# Patient Record
Sex: FEMALE | Race: OTHER | HISPANIC OR LATINO | Employment: FULL TIME | URBAN - METROPOLITAN AREA
[De-identification: names, ages, dates, MRNs, and addresses within clinical notes are randomized per-mention and may not be internally consistent; named-entity substitution may affect disease eponyms.]

---

## 2017-02-11 ENCOUNTER — APPOINTMENT (EMERGENCY)
Dept: RADIOLOGY | Facility: HOSPITAL | Age: 42
End: 2017-02-11
Payer: COMMERCIAL

## 2017-02-11 ENCOUNTER — HOSPITAL ENCOUNTER (EMERGENCY)
Facility: HOSPITAL | Age: 42
Discharge: HOME | End: 2017-02-11
Admitting: EMERGENCY MEDICINE
Payer: COMMERCIAL

## 2017-02-11 VITALS
RESPIRATION RATE: 16 BRPM | TEMPERATURE: 98.8 F | SYSTOLIC BLOOD PRESSURE: 101 MMHG | WEIGHT: 180 LBS | HEART RATE: 91 BPM | OXYGEN SATURATION: 96 % | DIASTOLIC BLOOD PRESSURE: 55 MMHG

## 2017-02-11 DIAGNOSIS — J11.1 INFLUENZA-LIKE ILLNESS: Primary | ICD-10-CM

## 2017-02-11 LAB
FLUAV AG SPEC QL IA: NEGATIVE
FLUAV AG SPEC QL: DETECTED
FLUBV AG SPEC QL IA: NEGATIVE
FLUBV AG SPEC QL: ABNORMAL
RSV B RNA SPEC QL NAA+PROBE: ABNORMAL

## 2017-02-11 PROCEDURE — 87798 DETECT AGENT NOS DNA AMP: CPT | Performed by: PHYSICIAN ASSISTANT

## 2017-02-11 PROCEDURE — 99284 EMERGENCY DEPT VISIT MOD MDM: CPT

## 2017-02-11 PROCEDURE — 71020 HB CHEST X-RAY 2VW FRONTAL&LATL: CPT

## 2017-02-11 PROCEDURE — 87400 INFLUENZA A/B EACH AG IA: CPT | Performed by: PHYSICIAN ASSISTANT

## 2017-02-11 PROCEDURE — 96360 HYDRATION IV INFUSION INIT: CPT

## 2017-02-11 RX ORDER — IBUPROFEN 600 MG/1
600 TABLET ORAL ONCE
Status: COMPLETED | OUTPATIENT
Start: 2017-02-11 | End: 2017-02-11

## 2017-02-11 RX ORDER — OSELTAMIVIR PHOSPHATE 75 MG/1
75 CAPSULE ORAL 2 TIMES DAILY
Qty: 10 CAPSULE | Refills: 0 | Status: SHIPPED | OUTPATIENT
Start: 2017-02-11 | End: 2017-02-16

## 2017-02-11 RX ADMIN — SODIUM CHLORIDE 1000 ML: 0.9 INJECTION, SOLUTION INTRAVENOUS at 09:01

## 2017-02-11 RX ADMIN — IBUPROFEN 600 MG: 600 TABLET, FILM COATED ORAL at 09:03

## 2019-03-21 ENCOUNTER — HOSPITAL ENCOUNTER (EMERGENCY)
Facility: HOSPITAL | Age: 44
Discharge: HOME | End: 2019-03-21
Attending: EMERGENCY MEDICINE | Admitting: EMERGENCY MEDICINE
Payer: COMMERCIAL

## 2019-03-21 ENCOUNTER — APPOINTMENT (EMERGENCY)
Dept: RADIOLOGY | Facility: HOSPITAL | Age: 44
End: 2019-03-21
Payer: COMMERCIAL

## 2019-03-21 VITALS
OXYGEN SATURATION: 100 % | HEART RATE: 85 BPM | BODY MASS INDEX: 32.01 KG/M2 | SYSTOLIC BLOOD PRESSURE: 116 MMHG | RESPIRATION RATE: 20 BRPM | TEMPERATURE: 98.2 F | DIASTOLIC BLOOD PRESSURE: 63 MMHG | WEIGHT: 204.37 LBS

## 2019-03-21 DIAGNOSIS — J20.9 ACUTE BRONCHITIS: Primary | ICD-10-CM

## 2019-03-21 PROCEDURE — 99283 EMERGENCY DEPT VISIT LOW MDM: CPT

## 2019-03-21 PROCEDURE — 71046 X-RAY EXAM CHEST 2 VIEWS: CPT

## 2019-03-21 RX ORDER — LEVOTHYROXINE SODIUM 0.1 MG/1
100 TABLET ORAL DAILY
COMMUNITY

## 2019-03-21 RX ORDER — METHYLPREDNISOLONE 4 MG/1
TABLET ORAL
Qty: 21 TABLET | Refills: 0 | Status: SHIPPED | OUTPATIENT
Start: 2019-03-21 | End: 2021-09-14 | Stop reason: ALTCHOICE

## 2019-03-21 RX ORDER — SUMATRIPTAN 50 MG/1
50 TABLET, FILM COATED ORAL
COMMUNITY
Start: 2017-04-11

## 2019-03-21 RX ORDER — AZITHROMYCIN 250 MG/1
250 TABLET, FILM COATED ORAL DAILY
Qty: 6 TABLET | Refills: 0 | Status: SHIPPED | OUTPATIENT
Start: 2019-03-21 | End: 2019-03-26

## 2019-03-21 RX ORDER — ALBUTEROL SULFATE 90 UG/1
2 AEROSOL, METERED RESPIRATORY (INHALATION) EVERY 4 HOURS PRN
Qty: 1 INHALER | Refills: 0 | Status: SHIPPED | OUTPATIENT
Start: 2019-03-21 | End: 2020-03-20

## 2019-03-21 RX ORDER — SPIRONOLACTONE 50 MG/1
TABLET, FILM COATED ORAL
COMMUNITY
Start: 2014-03-04

## 2019-03-21 RX ORDER — MECLIZINE HCL 12.5 MG/1
TABLET ORAL
COMMUNITY
End: 2021-09-14 | Stop reason: ALTCHOICE

## 2019-03-21 NOTE — ED PROVIDER NOTES
History  Chief Complaint   Patient presents with    Flu Symptoms     Patient c/o generalized body aches, headache, dry cough, subjetive fever all which began yesterday   Neck Pain     c/o left sided neck pain  This is a 59-year-old patient who started with generalized body aches diffuse nonfocal headache without blurred vision double vision no photophobia a dry hacky cough since last night and a subjective fever and chills  No shortness of breath no chest pain no pleuritic chest pain  She is not tachycardic or tachypneic  Nothing makes it better or worse she tried nothing over-the-counter approximately 1 month ago she was diagnosed with influenza  She states it feels the same  She also has some left-sided neck pain which is reproducible she states she slipped on the wrong way she does not have any signs of meningitis at this time does have some pain at the base her neck superior aspect of her trapped on her left without radicular symptoms  No nausea vomiting or diarrhea she is eating and making urine no urgency frequency or dysuria  Differential diagnosis includes not limited to influenza, pneumonia, bronchitis with neck spasm  Prior to Admission Medications   Prescriptions Last Dose Informant Patient Reported? Taking? Ergocalciferol (VITAMIN D2 PO)   Yes Yes   Si,000 Units   SUMAtriptan (IMITREX) 50 mg tablet   Yes Yes   Sig: Take 50 mg by mouth   levothyroxine (SYNTHROID) 100 mcg tablet   Yes No   Sig: Take 100 mcg by mouth daily 100 mcg Monday through Friday 88 Saturday and    meclizine (ANTIVERT) 12 5 MG tablet   Yes No   Sig: meclizine 12 5 mg tablet   spironolactone (ALDACTONE) 50 mg tablet   Yes Yes   Sig: spironolactone 50 mg tablet      Facility-Administered Medications: None       Past Medical History:   Diagnosis Date    Disease of thyroid gland        History reviewed  No pertinent surgical history  History reviewed  No pertinent family history    I have reviewed and agree with the history as documented  Social History     Tobacco Use    Smoking status: Never Smoker   Substance Use Topics    Alcohol use: No    Drug use: No        Review of Systems   All other systems reviewed and are negative  Physical Exam  Physical Exam   Constitutional: She appears well-developed and well-nourished  HENT:   Head: Normocephalic and atraumatic  Right Ear: External ear normal    Left Ear: External ear normal    Nose: Nose normal    Mouth/Throat: Oropharynx is clear and moist    Eyes: Pupils are equal, round, and reactive to light  Conjunctivae are normal    Neck: Normal range of motion  Neck supple  Cardiovascular: Normal rate and regular rhythm  Pulmonary/Chest: Effort normal    When she coughs she has a bit of expiratory wheeze otherwise lungs are clear on inspiration   Abdominal: Soft  Bowel sounds are normal  There is no tenderness  Musculoskeletal:        Arms:  Neurological: She is alert  Skin: Skin is warm  Psychiatric: She has a normal mood and affect  Her behavior is normal    Nursing note and vitals reviewed        Vital Signs  ED Triage Vitals [03/21/19 0835]   Temperature Pulse Respirations Blood Pressure SpO2   98 2 °F (36 8 °C) 85 20 116/63 100 %      Temp Source Heart Rate Source Patient Position - Orthostatic VS BP Location FiO2 (%)   Oral Monitor Lying Right arm --      Pain Score       6           Vitals:    03/21/19 0835   BP: 116/63   Pulse: 85   Patient Position - Orthostatic VS: Lying         Visual Acuity      ED Medications  Medications - No data to display    Diagnostic Studies  Results Reviewed     None                 XR chest 2 views   ED Interpretation by Chucho Hicks PA-C (03/21 3909)   nad                 Procedures  Procedures       Phone Contacts  ED Phone Contact    ED Course               PERC Rule for PE      Most Recent Value   PERC Rule for PE   Age >=50  0 Filed at: 03/21/2019 0934   HR >=100  0 Filed at: 03/21/2019 6899 O2 Sat on room air < 95%  --   History of PE or DVT  0 Filed at: 03/21/2019 8368   Recent trauma or surgery  0 Filed at: 03/21/2019 0934   Hemoptysis  0 Filed at: 03/21/2019 0987   Exogenous estrogen  0 Filed at: 03/21/2019 0934   Unilateral leg swelling  0 Filed at: 03/21/2019 0934   PERC Rule for PE Results  0 Filed at: 03/21/2019 9996                      MDM    Disposition  Final diagnoses:   Acute bronchitis     Time reflects when diagnosis was documented in both MDM as applicable and the Disposition within this note     Time User Action Codes Description Comment    3/21/2019  9:33 AM Russher Brunsony Add [J20 9] Acute bronchitis       ED Disposition     ED Disposition Condition Date/Time Comment    Discharge Stable Thu Mar 21, 2019  9:32 AM Melissa Melendez discharge to home/self care  Follow-up Information     Follow up With Specialties Details Why [address] [facility] [address], [place] Family Medicine Schedule an appointment as soon as possible for a visit   [address] [place] [address]            Patient's Medications   Discharge Prescriptions    ALBUTEROL (PROVENTIL HFA,VENTOLIN HFA) 90 MCG/ACT INHALER    Inhale 2 puffs every 4 (four) hours as needed for wheezing       Start Date: 3/21/2019 End Date: 3/20/2020       Order Dose: 2 puffs       Quantity: 1 Inhaler    Refills: 0    AZITHROMYCIN (ZITHROMAX) 250 MG TABLET    Take 1 tablet (250 mg total) by mouth daily for 5 days 2 po day 1 then the 1 po day 2-5       Start Date: 3/21/2019 End Date: 3/26/2019       Order Dose: 250 mg       Quantity: 6 tablet    Refills: 0    METHYLPREDNISOLONE 4 MG TABLET THERAPY PACK    Use as directed on package       Start Date: 3/21/2019 End Date: --       Order Dose: --       Quantity: 21 tablet    Refills: 0     No discharge procedures on file      ED Provider  Electronically Signed by           Jenaro Corona PA-C  03/21/19 5219

## 2021-04-29 DIAGNOSIS — K03.6 DENTAL CALCULUS: ICD-10-CM

## 2021-04-29 DIAGNOSIS — K03.6 ACCRETIONS ON TEETH: ICD-10-CM

## 2021-04-29 DIAGNOSIS — Z01.20 ENCOUNTER FOR DENTAL EXAMINATION: ICD-10-CM

## 2021-05-10 ENCOUNTER — HOSPITAL ENCOUNTER (EMERGENCY)
Facility: HOSPITAL | Age: 46
Discharge: HOME | End: 2021-05-10
Attending: EMERGENCY MEDICINE
Payer: COMMERCIAL

## 2021-05-10 ENCOUNTER — APPOINTMENT (EMERGENCY)
Dept: RADIOLOGY | Facility: HOSPITAL | Age: 46
End: 2021-05-10
Payer: COMMERCIAL

## 2021-05-10 VITALS
BODY MASS INDEX: 32.49 KG/M2 | DIASTOLIC BLOOD PRESSURE: 84 MMHG | SYSTOLIC BLOOD PRESSURE: 147 MMHG | TEMPERATURE: 98.4 F | RESPIRATION RATE: 18 BRPM | HEART RATE: 97 BPM | WEIGHT: 207.45 LBS | OXYGEN SATURATION: 98 %

## 2021-05-10 DIAGNOSIS — M54.9 BACK PAIN: ICD-10-CM

## 2021-05-10 DIAGNOSIS — M54.2 NECK PAIN: Primary | ICD-10-CM

## 2021-05-10 DIAGNOSIS — R42 DIZZINESS: ICD-10-CM

## 2021-05-10 DIAGNOSIS — M25.559 HIP PAIN: ICD-10-CM

## 2021-05-10 LAB
ALBUMIN SERPL BCP-MCNC: 3.7 G/DL (ref 3.5–5)
ALP SERPL-CCNC: 60 U/L (ref 46–116)
ALT SERPL W P-5'-P-CCNC: 19 U/L (ref 12–78)
ANION GAP SERPL CALCULATED.3IONS-SCNC: 8 MMOL/L (ref 4–13)
AST SERPL W P-5'-P-CCNC: 11 U/L (ref 5–45)
ATRIAL RATE: 73 BPM
BACTERIA UR QL AUTO: ABNORMAL /HPF
BASOPHILS # BLD AUTO: 0.03 THOUSANDS/ΜL (ref 0–0.1)
BASOPHILS NFR BLD AUTO: 1 % (ref 0–1)
BILIRUB SERPL-MCNC: 0.13 MG/DL (ref 0.2–1)
BILIRUB UR QL STRIP: NEGATIVE
BUN SERPL-MCNC: 9 MG/DL (ref 5–25)
CALCIUM SERPL-MCNC: 8.9 MG/DL (ref 8.3–10.1)
CHLORIDE SERPL-SCNC: 104 MMOL/L (ref 100–108)
CLARITY UR: CLEAR
CO2 SERPL-SCNC: 30 MMOL/L (ref 21–32)
COLOR UR: YELLOW
CREAT SERPL-MCNC: 0.61 MG/DL (ref 0.6–1.3)
EOSINOPHIL # BLD AUTO: 0.14 THOUSAND/ΜL (ref 0–0.61)
EOSINOPHIL NFR BLD AUTO: 2 % (ref 0–6)
ERYTHROCYTE [DISTWIDTH] IN BLOOD BY AUTOMATED COUNT: 14.2 % (ref 11.6–15.1)
EXT PREG TEST URINE: NORMAL
EXT. CONTROL ED NAV: NORMAL
GFR SERPL CREATININE-BSD FRML MDRD: 110 ML/MIN/1.73SQ M
GLUCOSE SERPL-MCNC: 87 MG/DL (ref 65–140)
GLUCOSE UR STRIP-MCNC: NEGATIVE MG/DL
HCT VFR BLD AUTO: 38.4 % (ref 34.8–46.1)
HGB BLD-MCNC: 11.9 G/DL (ref 11.5–15.4)
HGB UR QL STRIP.AUTO: ABNORMAL
IMM GRANULOCYTES # BLD AUTO: 0.03 THOUSAND/UL (ref 0–0.2)
IMM GRANULOCYTES NFR BLD AUTO: 1 % (ref 0–2)
KETONES UR STRIP-MCNC: NEGATIVE MG/DL
LEUKOCYTE ESTERASE UR QL STRIP: NEGATIVE
LYMPHOCYTES # BLD AUTO: 1.72 THOUSANDS/ΜL (ref 0.6–4.47)
LYMPHOCYTES NFR BLD AUTO: 30 % (ref 14–44)
MCH RBC QN AUTO: 28.1 PG (ref 26.8–34.3)
MCHC RBC AUTO-ENTMCNC: 31 G/DL (ref 31.4–37.4)
MCV RBC AUTO: 91 FL (ref 82–98)
MONOCYTES # BLD AUTO: 0.55 THOUSAND/ΜL (ref 0.17–1.22)
MONOCYTES NFR BLD AUTO: 10 % (ref 4–12)
NEUTROPHILS # BLD AUTO: 3.32 THOUSANDS/ΜL (ref 1.85–7.62)
NEUTS SEG NFR BLD AUTO: 56 % (ref 43–75)
NITRITE UR QL STRIP: NEGATIVE
NON-SQ EPI CELLS URNS QL MICRO: ABNORMAL /HPF
NRBC BLD AUTO-RTO: 0 /100 WBCS
P AXIS: 49 DEGREES
PH UR STRIP.AUTO: 5.5 [PH] (ref 4.5–8)
PLATELET # BLD AUTO: 348 THOUSANDS/UL (ref 149–390)
PMV BLD AUTO: 9.6 FL (ref 8.9–12.7)
POTASSIUM SERPL-SCNC: 4.1 MMOL/L (ref 3.5–5.3)
PR INTERVAL: 100 MS
PROT SERPL-MCNC: 8 G/DL (ref 6.4–8.2)
PROT UR STRIP-MCNC: NEGATIVE MG/DL
QRS AXIS: 94 DEGREES
QRSD INTERVAL: 86 MS
QT INTERVAL: 364 MS
QTC INTERVAL: 401 MS
RBC # BLD AUTO: 4.24 MILLION/UL (ref 3.81–5.12)
RBC #/AREA URNS AUTO: ABNORMAL /HPF
SODIUM SERPL-SCNC: 142 MMOL/L (ref 136–145)
SP GR UR STRIP.AUTO: 1.01 (ref 1–1.03)
T WAVE AXIS: 64 DEGREES
TROPONIN I SERPL-MCNC: <0.02 NG/ML
TSH SERPL DL<=0.05 MIU/L-ACNC: 3.72 UIU/ML (ref 0.36–3.74)
UROBILINOGEN UR QL STRIP.AUTO: 0.2 E.U./DL
VENTRICULAR RATE: 73 BPM
WBC # BLD AUTO: 5.79 THOUSAND/UL (ref 4.31–10.16)
WBC #/AREA URNS AUTO: ABNORMAL /HPF

## 2021-05-10 PROCEDURE — 85025 COMPLETE CBC W/AUTO DIFF WBC: CPT | Performed by: PHYSICIAN ASSISTANT

## 2021-05-10 PROCEDURE — 93005 ELECTROCARDIOGRAM TRACING: CPT

## 2021-05-10 PROCEDURE — 80053 COMPREHEN METABOLIC PANEL: CPT | Performed by: PHYSICIAN ASSISTANT

## 2021-05-10 PROCEDURE — 84443 ASSAY THYROID STIM HORMONE: CPT | Performed by: PHYSICIAN ASSISTANT

## 2021-05-10 PROCEDURE — 84484 ASSAY OF TROPONIN QUANT: CPT | Performed by: PHYSICIAN ASSISTANT

## 2021-05-10 PROCEDURE — 81025 URINE PREGNANCY TEST: CPT | Performed by: PHYSICIAN ASSISTANT

## 2021-05-10 PROCEDURE — 72040 X-RAY EXAM NECK SPINE 2-3 VW: CPT

## 2021-05-10 PROCEDURE — 93010 ELECTROCARDIOGRAM REPORT: CPT | Performed by: INTERNAL MEDICINE

## 2021-05-10 PROCEDURE — 96372 THER/PROPH/DIAG INJ SC/IM: CPT

## 2021-05-10 PROCEDURE — 36415 COLL VENOUS BLD VENIPUNCTURE: CPT | Performed by: PHYSICIAN ASSISTANT

## 2021-05-10 PROCEDURE — 73521 X-RAY EXAM HIPS BI 2 VIEWS: CPT

## 2021-05-10 PROCEDURE — 99285 EMERGENCY DEPT VISIT HI MDM: CPT | Performed by: PHYSICIAN ASSISTANT

## 2021-05-10 PROCEDURE — 72100 X-RAY EXAM L-S SPINE 2/3 VWS: CPT

## 2021-05-10 PROCEDURE — 99284 EMERGENCY DEPT VISIT MOD MDM: CPT

## 2021-05-10 PROCEDURE — 81001 URINALYSIS AUTO W/SCOPE: CPT

## 2021-05-10 RX ORDER — DIAZEPAM 5 MG/ML
5 INJECTION, SOLUTION INTRAMUSCULAR; INTRAVENOUS ONCE
Status: DISCONTINUED | OUTPATIENT
Start: 2021-05-10 | End: 2021-05-10 | Stop reason: HOSPADM

## 2021-05-10 RX ORDER — METHOCARBAMOL 500 MG/1
500 TABLET, FILM COATED ORAL EVERY 12 HOURS PRN
Qty: 21 TABLET | Refills: 0 | Status: SHIPPED | OUTPATIENT
Start: 2021-05-10 | End: 2021-09-14 | Stop reason: ALTCHOICE

## 2021-05-10 RX ORDER — KETOROLAC TROMETHAMINE 30 MG/ML
15 INJECTION, SOLUTION INTRAMUSCULAR; INTRAVENOUS ONCE
Status: COMPLETED | OUTPATIENT
Start: 2021-05-10 | End: 2021-05-10

## 2021-05-10 RX ADMIN — KETOROLAC TROMETHAMINE 15 MG: 30 INJECTION, SOLUTION INTRAMUSCULAR; INTRAVENOUS at 17:40

## 2021-05-10 NOTE — ED PROVIDER NOTES
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 Taking? Ergocalciferol (VITAMIN D2 PO)   Yes No   Si,000 Units   SUMAtriptan (IMITREX) 50 mg tablet   Yes No   Sig: Take 50 mg by mouth   levothyroxine (SYNTHROID) 100 mcg tablet   Yes No   Sig: Take 100 mcg by mouth daily 100 mcg Monday through Friday 88 Saturday and    meclizine (ANTIVERT) 12 5 MG tablet   Yes No   Sig: meclizine 12 5 mg tablet   methylPREDNISolone 4 MG tablet therapy pack   No No   Sig: Use as directed on package   spironolactone (ALDACTONE) 50 mg tablet   Yes No   Sig: spironolactone 50 mg tablet      Facility-Administered Medications: None       Past Medical History:   Diagnosis Date    Disease of thyroid gland     Scoliosis        History reviewed  No pertinent surgical history  History reviewed  No pertinent family history  I have reviewed and agree with the history as documented      E-Cigarette/Vaping    E-Cigarette Use Never User      E-Cigarette/Vaping Substances     Social History     Tobacco Use    Smoking status: Never Smoker    Smokeless tobacco: Never Used   Substance Use Topics    Alcohol use: No    Drug use: No       Review of Systems   Constitutional: Negative for chills and fever  HENT: Negative for ear pain and sore throat  Eyes: Negative for visual disturbance  Respiratory: Negative for cough and shortness of breath  Cardiovascular: Negative for chest pain, palpitations and leg swelling  Gastrointestinal: Negative for abdominal pain, diarrhea, nausea and vomiting  Genitourinary: Negative for dysuria and hematuria  Musculoskeletal: Positive for back pain and neck pain  Bilateral hip and thigh pain   Skin: Negative for rash  Neurological: Positive for dizziness  Negative for speech difficulty and headaches  Psychiatric/Behavioral: Negative for confusion  Physical Exam  Physical Exam  Constitutional:       General: She is not in acute distress  Appearance: She is well-developed  She is not ill-appearing or toxic-appearing  HENT:      Head: Normocephalic and atraumatic  Right Ear: External ear normal       Left Ear: External ear normal       Nose: Nose normal       Mouth/Throat:      Lips: Pink  Mouth: Mucous membranes are moist    Eyes:      Extraocular Movements: Extraocular movements intact  Conjunctiva/sclera: Conjunctivae normal       Pupils: Pupils are equal, round, and reactive to light  Neck:      Musculoskeletal: Normal range of motion and neck supple  Cardiovascular:      Rate and Rhythm: Normal rate and regular rhythm  Pulmonary:      Effort: Pulmonary effort is normal       Breath sounds: Normal breath sounds  No decreased breath sounds, wheezing, rhonchi or rales  Musculoskeletal:      Right hip: Normal       Left hip: Normal       Cervical back: Normal       Thoracic back: Normal       Lumbar back: She exhibits decreased range of motion and tenderness   She exhibits no bony tenderness (no midline)  Comments: Neurovascularly intact distally  5/5 strength bilateral lower extremities  Distal pulses intact  Cap refill <2 seconds  Sensation intact  Skin:     General: Skin is warm and dry  Capillary Refill: Capillary refill takes less than 2 seconds  Findings: No rash  Neurological:      General: No focal deficit present  Mental Status: She is alert and oriented to person, place, and time  GCS: GCS eye subscore is 4  GCS verbal subscore is 5  GCS motor subscore is 6  Sensory: Sensation is intact  Motor: Motor function is intact  Coordination: Coordination is intact  Gait: Gait is intact  Deep Tendon Reflexes: Reflexes are normal and symmetric  Reflex Scores:       Patellar reflexes are 2+ on the right side and 2+ on the left side    Psychiatric:         Behavior: Behavior normal          Vital Signs  ED Triage Vitals [05/10/21 1432]   Temperature Pulse Respirations Blood Pressure SpO2   98 4 °F (36 9 °C) 97 18 147/84 98 %      Temp Source Heart Rate Source Patient Position - Orthostatic VS BP Location FiO2 (%)   Oral Monitor Sitting Right arm --      Pain Score       6           Vitals:    05/10/21 1432   BP: 147/84   Pulse: 97   Patient Position - Orthostatic VS: Sitting         Visual Acuity      ED Medications  Medications   ketorolac (TORADOL) injection 15 mg (15 mg Intramuscular Given by Other 5/10/21 1740)       Diagnostic Studies  Results Reviewed     Procedure Component Value Units Date/Time    Urine Microscopic [[id number]]  (Abnormal) Collected: 05/10/21 1711    Lab Status: Final result Specimen: Urine, Clean Catch Updated: 05/10/21 1753     RBC, UA 0-1 /hpf      WBC, UA None Seen /hpf      Epithelial Cells Occasional /hpf      Bacteria, UA Occasional /hpf     POCT pregnancy, urine [[id number]]  (Normal) Resulted: 05/10/21 1713    Lab Status: Final result Updated: 05/10/21 1713     EXT PREG TEST UR (Ref: Negative) Negative (-)     Control Valid    Urine Macroscopic, POC [[id number]]  (Abnormal) Collected: 05/10/21 1711    Lab Status: Final result Specimen: Urine Updated: 05/10/21 1712     Color, UA Yellow     Clarity, UA Clear     pH, UA 5 5     Leukocytes, UA Negative     Nitrite, UA Negative     Protein, UA Negative mg/dl      Glucose, UA Negative mg/dl      Ketones, UA Negative mg/dl      Urobilinogen, UA 0 2 E U /dl      Bilirubin, UA Negative     Blood, UA Trace     Specific [place], UA 1 015    Narrative:      CLINITEK RESULT    TSH [[id number]]  (Normal) Collected: 05/10/21 1620    Lab Status: Final result Specimen: Blood from Arm, Left Updated: 05/10/21 1652     TSH 3RD GENERATON 3 721 uIU/mL     Narrative:      Patients undergoing fluorescein dye angiography may retain small amounts of fluorescein in the body for 48-72 hours post procedure  Samples containing fluorescein can produce falsely depressed TSH values  If the patient had this procedure,a specimen should be resubmitted post fluorescein clearance        Troponin I [[id number]]  (Normal) Collected: 05/10/21 1620    Lab Status: Final result Specimen: Blood from Arm, Left Updated: 05/10/21 1645     Troponin I <0 02 ng/mL     Comprehensive metabolic panel [[id number]]  (Abnormal) Collected: 05/10/21 1620    Lab Status: Final result Specimen: Blood from Arm, Left Updated: 05/10/21 1643     Sodium 142 mmol/L      Potassium 4 1 mmol/L      Chloride 104 mmol/L      CO2 30 mmol/L      ANION GAP 8 mmol/L      BUN 9 mg/dL      Creatinine 0 61 mg/dL      Glucose 87 mg/dL      Calcium 8 9 mg/dL      AST 11 U/L      ALT 19 U/L      Alkaline Phosphatase 60 U/L      Total Protein 8 0 g/dL      Albumin 3 7 g/dL      Total Bilirubin 0 13 mg/dL      eGFR 110 ml/min/1 73sq m     Narrative:      [place] guidelines for Chronic Kidney Disease (CKD):     Stage 1 with normal or high GFR (GFR > 90 mL/min/1 73 square meters)    Stage 2 Mild CKD (GFR = 60-89 mL/min/1 73 square meters)    Stage 3A Moderate CKD (GFR = 45-59 mL/min/1 73 square meters)    Stage 3B Moderate CKD (GFR = 30-44 mL/min/1 73 square meters)    Stage 4 Severe CKD (GFR = 15-29 mL/min/1 73 square meters)    Stage 5 End Stage CKD (GFR <15 mL/min/1 73 square meters)  Note: GFR calculation is accurate only with a steady state creatinine    CBC and differential [[id number]]  (Abnormal) Collected: 05/10/21 1620    Lab Status: Final result Specimen: Blood from Arm, Left Updated: 05/10/21 1625     WBC 5 79 Thousand/uL      RBC 4 24 Million/uL      Hemoglobin 11 9 g/dL      Hematocrit 38 4 %      MCV 91 fL      MCH 28 1 pg      MCHC 31 0 g/dL      RDW 14 2 %      MPV 9 6 fL      Platelets 890 Thousands/uL      nRBC 0 /100 WBCs      Neutrophils Relative 56 %      Immat GRANS % 1 %      Lymphocytes Relative 30 %      Monocytes Relative 10 %      Eosinophils Relative 2 %      Basophils Relative 1 %      Neutrophils Absolute 3 32 Thousands/µL      Immature Grans Absolute 0 03 Thousand/uL      Lymphocytes Absolute 1 72 Thousands/µL      Monocytes Absolute 0 55 Thousand/µL      Eosinophils Absolute 0 14 Thousand/µL      Basophils Absolute 0 03 Thousands/µL                  XR lumbar spine 2 or 3 views   Final Result by Natalie Colon DO (05/10 1709)   Stable concave right thoracolumbar scoliotic curvature  Stable minimal facet degenerative changes L4-L5  Workstation performed: [id number]         XR cervical spine 2 or 3 views   Final Result by Natalie Colon DO (05/10 1716)   No acute osseous abnormality  Workstation performed: [id number]         XR hips bilateral with ap pelvis 2 vw   Final Result by Natalie Colon DO (05/10 1701)   Mild degenerative changes pubic symphysis  No acute osseous abnormality              Workstation performed: [id number]                    Procedures  ECG 12 Lead Documentation Only    Date/Time: 5/10/2021 4:28 PM  Performed by: Dilma De Jesus XANDER  Authorized by: Matt Moreno PA-C     Indications / Diagnosis:  Dizziness  ECG reviewed by me, the ED Provider: yes    Patient location:  ED  Previous ECG:     Previous ECG:  Unavailable    Comparison to cardiac monitor: Yes    Interpretation:     Interpretation: non-specific    Rate:     ECG rate:  73    ECG rate assessment: normal    Rhythm:     Rhythm: sinus rhythm    Ectopy:     Ectopy: none    QRS:     QRS axis:  Right    QRS intervals:  Normal  Conduction:     Conduction: normal    ST segments:     ST segments:  Normal  T waves:     T waves: normal    Comments:      QT/QTc: 364/401  No STEMI             ED Course  ED Course as of May 14 0729   Mon May 10, 2021   1712 Stable concave right thoracolumbar scoliotic curvature  Stable minimal facet degenerative changes L4-L5  XR lumbar spine 2 or 3 views   1712 Mild degenerative changes pubic symphysis  No acute osseous abnormality  XR hips bilateral with ap pelvis 2 vw   1732 No acute osseous abnormality  XR cervical spine 2 or 3 views                             SBIRT 20yo+      Most Recent Value   SBIRT (24 yo +)   In order to provide better care to our patients, we are screening all of our patients for alcohol and drug use  Would it be okay to ask you these screening questions? 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   Pt states she also has been having intermittent episodes of dizziness, worse with head movement  Pt denies dizziness at this moment  EKG shows no arrhythmia or ST abnormality   Troponin negative  Lab work including TSH without significant abnormality  X-ray bilateral hips/pelvis shows Mild degenerative changes pubic symphysis  No acute osseous abnormality  X-ray lumbar spine shows Stable concave right thoracolumbar scoliotic curvature  Stable minimal facet degenerative changes L4-L5  X-ray cervical spine shows No acute osseous abnormality  Toradol given in emergency department  Rx for Robaxin provided for symptomatic relief of body pain  Discussed lab work and imaging with patient at bedside  Recommended patient follow-up with PCP regarding ongoing symptoms for further workup and evaluation  Patient verbalizes understanding and agrees with plan  The management plan was discussed in detail with the patient at bedside and all questions were answered  Prior to discharge, I provided both verbal and written instructions  I discussed with the patient the signs and symptoms for which to return to the emergency department  All questions were answered and patient was comfortable with the plan of care and discharged to home  The patient agrees to return to the Emergency Department for concerns and/or progression of illness          Disposition  Final diagnoses:   Neck pain   Back pain   Hip pain   Dizziness     Time reflects when diagnosis was documented in both MDM as applicable and the Disposition within this note     Time User Action Codes Description Comment    5/10/2021  5:26 PM Belita Corolla Add [M54 2] Neck pain     5/10/2021  5:26 PM Belita Corolla Add [M54 9] Back pain     5/10/2021  5:26 PM Belita Corolla Add [M25 559] Hip pain     5/10/2021  5:26 PM Belita Corolla Add [R42] Dizziness       ED Disposition     ED Disposition Condition Date/Time Comment    Discharge Stable Mon May 10, 2021  5:27 PM Rashid Razo discharge to home/self care  Follow-up Information     Follow up With Specialties Details Why Contact Info Additional Information    José Miguel Reddy MD [facility] Schedule an appointment as soon as possible for a visit   [facility] [address]  [address] Rheumatology   [address]  [place] [address] [facility], [address], [address] [facility], [facility], [place], [id number] [phone]          Discharge Medication List as of 5/10/2021  5:27 PM      START taking these medications    Details   methocarbamol (ROBAXIN) 500 mg tablet Take 1 tablet (500 mg total) by mouth every 12 (twelve) hours as needed for muscle spasms, Starting Mon 5/10/2021, Normal         CONTINUE these medications which have NOT CHANGED    Details   Ergocalciferol (VITAMIN D2 PO) 50,000 Units, Starting Mon 3/16/2015, Historical Med      levothyroxine (SYNTHROID) 100 mcg tablet Take 100 mcg by mouth daily 100 mcg Monday through Friday 88 Saturday and Sunday, Historical Med      meclizine (ANTIVERT) 12 5 MG tablet meclizine 12 5 mg tablet, Historical Med      methylPREDNISolone 4 MG tablet therapy pack Use as directed on package, Print      spironolactone (ALDACTONE) 50 mg tablet spironolactone 50 mg tablet, Historical Med      SUMAtriptan (IMITREX) 50 mg tablet Take 50 mg by mouth, Starting Tue 4/11/2017, Historical Med           No discharge procedures on file      PDMP Review     None          ED Provider  Electronically Signed by           Sudheer Fam PA-C  05/14/21 2914

## 2021-05-10 NOTE — Clinical Note
Unruly Carmen was seen and treated in our emergency department on 5/10/2021  Diagnosis:     Yanci Joseph  may return to work on return date  She may return on this date: 05/12/2021         If you have any questions or concerns, please don't hesitate to call        Elidia Osborne PA-C    ______________________________           _______________          _______________  Hospital Representative                              Date                                Time

## 2021-09-14 ENCOUNTER — APPOINTMENT (EMERGENCY)
Dept: CT IMAGING | Facility: HOSPITAL | Age: 46
End: 2021-09-14
Payer: COMMERCIAL

## 2021-09-14 ENCOUNTER — HOSPITAL ENCOUNTER (EMERGENCY)
Facility: HOSPITAL | Age: 46
Discharge: HOME | End: 2021-09-14
Attending: EMERGENCY MEDICINE | Admitting: EMERGENCY MEDICINE
Payer: COMMERCIAL

## 2021-09-14 VITALS
HEIGHT: 67 IN | WEIGHT: 215.61 LBS | SYSTOLIC BLOOD PRESSURE: 127 MMHG | RESPIRATION RATE: 16 BRPM | TEMPERATURE: 98.5 F | HEART RATE: 70 BPM | OXYGEN SATURATION: 98 % | BODY MASS INDEX: 33.84 KG/M2 | DIASTOLIC BLOOD PRESSURE: 80 MMHG

## 2021-09-14 DIAGNOSIS — R79.89 ELEVATED TSH: ICD-10-CM

## 2021-09-14 DIAGNOSIS — R10.9 FLANK PAIN: Primary | ICD-10-CM

## 2021-09-14 DIAGNOSIS — N94.89 PELVIC CONGESTION SYNDROME: ICD-10-CM

## 2021-09-14 DIAGNOSIS — R91.1 LUNG NODULE: ICD-10-CM

## 2021-09-14 LAB
ALBUMIN SERPL BCP-MCNC: 4 G/DL (ref 3.5–5)
ALP SERPL-CCNC: 58 U/L (ref 46–116)
ALT SERPL W P-5'-P-CCNC: 23 U/L (ref 12–78)
ANION GAP SERPL CALCULATED.3IONS-SCNC: 7 MMOL/L (ref 4–13)
AST SERPL W P-5'-P-CCNC: 17 U/L (ref 5–45)
ATRIAL RATE: 65 BPM
ATRIAL RATE: 81 BPM
BACTERIA UR QL AUTO: ABNORMAL /HPF
BASOPHILS # BLD AUTO: 0.06 THOUSANDS/ΜL (ref 0–0.1)
BASOPHILS NFR BLD AUTO: 1 % (ref 0–1)
BILIRUB SERPL-MCNC: 0.22 MG/DL (ref 0.2–1)
BILIRUB UR QL STRIP: NEGATIVE
BUN SERPL-MCNC: 5 MG/DL (ref 5–25)
CALCIUM SERPL-MCNC: 9.2 MG/DL (ref 8.3–10.1)
CHLORIDE SERPL-SCNC: 101 MMOL/L (ref 100–108)
CLARITY UR: CLEAR
CO2 SERPL-SCNC: 31 MMOL/L (ref 21–32)
COLOR UR: YELLOW
CREAT SERPL-MCNC: 0.72 MG/DL (ref 0.6–1.3)
EOSINOPHIL # BLD AUTO: 0.15 THOUSAND/ΜL (ref 0–0.61)
EOSINOPHIL NFR BLD AUTO: 2 % (ref 0–6)
ERYTHROCYTE [DISTWIDTH] IN BLOOD BY AUTOMATED COUNT: 13.1 % (ref 11.6–15.1)
EXT PREG TEST URINE: NEGATIVE
EXT. CONTROL ED NAV: NORMAL
GFR SERPL CREATININE-BSD FRML MDRD: 101 ML/MIN/1.73SQ M
GLUCOSE SERPL-MCNC: 84 MG/DL (ref 65–140)
GLUCOSE UR STRIP-MCNC: NEGATIVE MG/DL
HCT VFR BLD AUTO: 39.9 % (ref 34.8–46.1)
HGB BLD-MCNC: 12.8 G/DL (ref 11.5–15.4)
HGB UR QL STRIP.AUTO: ABNORMAL
IMM GRANULOCYTES # BLD AUTO: 0.02 THOUSAND/UL (ref 0–0.2)
IMM GRANULOCYTES NFR BLD AUTO: 0 % (ref 0–2)
KETONES UR STRIP-MCNC: NEGATIVE MG/DL
LEUKOCYTE ESTERASE UR QL STRIP: NEGATIVE
LIPASE SERPL-CCNC: 134 U/L (ref 73–393)
LYMPHOCYTES # BLD AUTO: 2.01 THOUSANDS/ΜL (ref 0.6–4.47)
LYMPHOCYTES NFR BLD AUTO: 32 % (ref 14–44)
MCH RBC QN AUTO: 30.2 PG (ref 26.8–34.3)
MCHC RBC AUTO-ENTMCNC: 32.1 G/DL (ref 31.4–37.4)
MCV RBC AUTO: 94 FL (ref 82–98)
MONOCYTES # BLD AUTO: 0.5 THOUSAND/ΜL (ref 0.17–1.22)
MONOCYTES NFR BLD AUTO: 8 % (ref 4–12)
NEUTROPHILS # BLD AUTO: 3.6 THOUSANDS/ΜL (ref 1.85–7.62)
NEUTS SEG NFR BLD AUTO: 57 % (ref 43–75)
NITRITE UR QL STRIP: NEGATIVE
NON-SQ EPI CELLS URNS QL MICRO: ABNORMAL /HPF
NRBC BLD AUTO-RTO: 0 /100 WBCS
P AXIS: 73 DEGREES
P AXIS: 74 DEGREES
PH UR STRIP.AUTO: 5.5 [PH] (ref 4.5–8)
PLATELET # BLD AUTO: 304 THOUSANDS/UL (ref 149–390)
PMV BLD AUTO: 9.7 FL (ref 8.9–12.7)
POTASSIUM SERPL-SCNC: 3.6 MMOL/L (ref 3.5–5.3)
PR INTERVAL: 114 MS
PR INTERVAL: 116 MS
PROT SERPL-MCNC: 8.4 G/DL (ref 6.4–8.2)
PROT UR STRIP-MCNC: NEGATIVE MG/DL
QRS AXIS: 91 DEGREES
QRS AXIS: 93 DEGREES
QRSD INTERVAL: 82 MS
QRSD INTERVAL: 86 MS
QT INTERVAL: 398 MS
QT INTERVAL: 420 MS
QTC INTERVAL: 436 MS
QTC INTERVAL: 462 MS
RBC # BLD AUTO: 4.24 MILLION/UL (ref 3.81–5.12)
RBC #/AREA URNS AUTO: ABNORMAL /HPF
SODIUM SERPL-SCNC: 139 MMOL/L (ref 136–145)
SP GR UR STRIP.AUTO: 1.01 (ref 1–1.03)
T WAVE AXIS: 54 DEGREES
T WAVE AXIS: 67 DEGREES
TROPONIN I SERPL-MCNC: <0.02 NG/ML
TROPONIN I SERPL-MCNC: <0.02 NG/ML
TSH SERPL DL<=0.05 MIU/L-ACNC: 5.49 UIU/ML (ref 0.36–3.74)
UROBILINOGEN UR QL STRIP.AUTO: 0.2 E.U./DL
VENTRICULAR RATE: 65 BPM
VENTRICULAR RATE: 81 BPM
WBC # BLD AUTO: 6.34 THOUSAND/UL (ref 4.31–10.16)
WBC #/AREA URNS AUTO: ABNORMAL /HPF

## 2021-09-14 PROCEDURE — 74177 CT ABD & PELVIS W/CONTRAST: CPT

## 2021-09-14 PROCEDURE — 85025 COMPLETE CBC W/AUTO DIFF WBC: CPT | Performed by: PHYSICIAN ASSISTANT

## 2021-09-14 PROCEDURE — 84484 ASSAY OF TROPONIN QUANT: CPT | Performed by: PHYSICIAN ASSISTANT

## 2021-09-14 PROCEDURE — 74176 CT ABD & PELVIS W/O CONTRAST: CPT

## 2021-09-14 PROCEDURE — 99284 EMERGENCY DEPT VISIT MOD MDM: CPT

## 2021-09-14 PROCEDURE — 96375 TX/PRO/DX INJ NEW DRUG ADDON: CPT

## 2021-09-14 PROCEDURE — 36415 COLL VENOUS BLD VENIPUNCTURE: CPT | Performed by: PHYSICIAN ASSISTANT

## 2021-09-14 PROCEDURE — 84443 ASSAY THYROID STIM HORMONE: CPT | Performed by: PHYSICIAN ASSISTANT

## 2021-09-14 PROCEDURE — 80053 COMPREHEN METABOLIC PANEL: CPT | Performed by: PHYSICIAN ASSISTANT

## 2021-09-14 PROCEDURE — 93005 ELECTROCARDIOGRAM TRACING: CPT

## 2021-09-14 PROCEDURE — 96374 THER/PROPH/DIAG INJ IV PUSH: CPT

## 2021-09-14 PROCEDURE — 83690 ASSAY OF LIPASE: CPT | Performed by: PHYSICIAN ASSISTANT

## 2021-09-14 PROCEDURE — 93010 ELECTROCARDIOGRAM REPORT: CPT | Performed by: INTERNAL MEDICINE

## 2021-09-14 PROCEDURE — 96361 HYDRATE IV INFUSION ADD-ON: CPT

## 2021-09-14 PROCEDURE — 81025 URINE PREGNANCY TEST: CPT | Performed by: EMERGENCY MEDICINE

## 2021-09-14 PROCEDURE — 99285 EMERGENCY DEPT VISIT HI MDM: CPT | Performed by: PHYSICIAN ASSISTANT

## 2021-09-14 PROCEDURE — 81001 URINALYSIS AUTO W/SCOPE: CPT

## 2021-09-14 RX ORDER — ONDANSETRON 2 MG/ML
4 INJECTION INTRAMUSCULAR; INTRAVENOUS ONCE
Status: COMPLETED | OUTPATIENT
Start: 2021-09-14 | End: 2021-09-14

## 2021-09-14 RX ORDER — KETOROLAC TROMETHAMINE 30 MG/ML
15 INJECTION, SOLUTION INTRAMUSCULAR; INTRAVENOUS ONCE
Status: COMPLETED | OUTPATIENT
Start: 2021-09-14 | End: 2021-09-14

## 2021-09-14 RX ADMIN — SODIUM CHLORIDE 1000 ML: 0.9 INJECTION, SOLUTION INTRAVENOUS at 14:28

## 2021-09-14 RX ADMIN — ONDANSETRON 4 MG: 2 INJECTION INTRAMUSCULAR; INTRAVENOUS at 14:28

## 2021-09-14 RX ADMIN — IOHEXOL 100 ML: 350 INJECTION, SOLUTION INTRAVENOUS at 18:14

## 2021-09-14 RX ADMIN — MORPHINE SULFATE 2 MG: 2 INJECTION, SOLUTION INTRAMUSCULAR; INTRAVENOUS at 17:51

## 2021-09-14 RX ADMIN — KETOROLAC TROMETHAMINE 15 MG: 30 INJECTION, SOLUTION INTRAMUSCULAR; INTRAVENOUS at 14:30

## 2021-09-14 NOTE — ED PROVIDER NOTES
History  Chief Complaint   Patient presents with    Flank Pain     R flank pain x 1 week pain comes and goes, also having nausea  pain does not radiate into abdomen, denies hx of kidney stones     Patient presents to the ER for evaluation of right flank pain  The patient states that the pain began around 1 week ago  States that the pain "comes and goes " Denies anything that makes the pain better  Denies taking any medication PTA  Notes mild associated nausea  States that she also felt like her heart was racing last night and had an episode of chest pain when her flank pain became severe  The patient states that she saw her PCP who was concerned that it may be kidney stones and gave her a script for an outpatient ultrasound however states that she has not gotten it done yet  Patient states she has a history of a cholecystectomy and two c-sections  Patient denies any abdominal trauma  Denies any fevers, headaches, dizziness, syncope, difficulty breathing, vomiting, diarrhea, dysuria, or any other concerning symptoms  Prior to Admission Medications   Prescriptions Last Dose Informant Patient Reported? Taking? Ergocalciferol (VITAMIN D2 PO) 2021 at Unknown time  Yes Yes   Si,000 Units   SUMAtriptan (IMITREX) 50 mg tablet   Yes No   Sig: Take 50 mg by mouth   levothyroxine (SYNTHROID) 100 mcg tablet 2021 at Unknown time  Yes Yes   Sig: Take 100 mcg by mouth daily 100 mcg Monday through Friday 88 Saturday and    spironolactone (ALDACTONE) 50 mg tablet 2021 at Unknown time  Yes Yes   Sig: spironolactone 50 mg tablet      Facility-Administered Medications: None       Past Medical History:   Diagnosis Date    Disease of thyroid gland     Scoliosis        Past Surgical History:   Procedure Laterality Date     SECTION      CHOLECYSTECTOMY         History reviewed  No pertinent family history  I have reviewed and agree with the history as documented      E-Cigarette/Vaping    E-Cigarette Use Never User      E-Cigarette/Vaping Substances     Social History     Tobacco Use    Smoking status: Never Smoker    Smokeless tobacco: Never Used   Vaping Use    Vaping Use: Never used   Substance Use Topics    Alcohol use: No    Drug use: No       Review of Systems   Constitutional: Negative for fever  HENT: Negative for congestion, rhinorrhea and sore throat  Respiratory: Negative for shortness of breath  Cardiovascular: Positive for chest pain  Gastrointestinal: Positive for abdominal pain and nausea  Negative for diarrhea and vomiting  Genitourinary: Positive for flank pain  Negative for dysuria  Musculoskeletal: Negative for back pain and neck pain  Skin: Negative for rash  Neurological: Negative for weakness, numbness and headaches  All other systems reviewed and are negative  Physical Exam  Physical Exam  Constitutional:       Appearance: She is well-developed  HENT:      Head: Normocephalic and atraumatic  Nose: Nose normal    Eyes:      Conjunctiva/sclera: Conjunctivae normal    Cardiovascular:      Rate and Rhythm: Normal rate  Heart sounds: Normal heart sounds  Pulmonary:      Effort: Pulmonary effort is normal  No respiratory distress  Breath sounds: Normal breath sounds  No stridor  No wheezing, rhonchi or rales  Abdominal:      Palpations: Abdomen is soft  Tenderness: There is abdominal tenderness in the right upper quadrant and right lower quadrant  There is no guarding  Musculoskeletal:         General: Normal range of motion  Cervical back: Normal range of motion  Skin:     General: Skin is warm  Capillary Refill: Capillary refill takes less than 2 seconds  Neurological:      Mental Status: She is alert and oriented to person, place, and time           Vital Signs  ED Triage Vitals   Temperature Pulse Respirations Blood Pressure SpO2   09/14/21 1220 09/14/21 1220 09/14/21 1220 09/14/21 1220 09/14/21 1220   98 5 °F (36 9 °C) 92 19 135/71 98 %      Temp Source Heart Rate Source Patient Position - Orthostatic VS BP Location FiO2 (%)   09/14/21 1220 09/14/21 1220 09/14/21 1439 09/14/21 1439 --   Oral Monitor Lying Right arm       Pain Score       09/14/21 1220       9           Vitals:    09/14/21 1220 09/14/21 1439 09/14/21 1715 09/14/21 2012   BP: 135/71 115/63 126/71 127/80   Pulse: 92 70 80 70   Patient Position - Orthostatic VS:  Lying Lying Lying         Visual Acuity      ED Medications  Medications   sodium chloride 0 9 % bolus 1,000 mL (0 mL Intravenous Stopped 9/14/21 1632)   ketorolac (TORADOL) injection 15 mg (15 mg Intravenous Given 9/14/21 1430)   ondansetron (ZOFRAN) injection 4 mg (4 mg Intravenous Given 9/14/21 1428)   morphine injection 2 mg (2 mg Intravenous Given 9/14/21 1751)   iohexol (OMNIPAQUE) 350 MG/ML injection (SINGLE-DOSE) 100 mL (100 mL Intravenous Given 9/14/21 1814)       Diagnostic Studies  Results Reviewed     Procedure Component Value Units Date/Time    Troponin I [[id number]]  (Normal) Collected: 09/14/21 1851    Lab Status: Final result Specimen: Blood from Arm, Left Updated: 09/14/21 1923     Troponin I <0 02 ng/mL     TSH [[id number]]  (Abnormal) Collected: 09/14/21 1432    Lab Status: Final result Specimen: Blood from Arm, Left Updated: 09/14/21 1500     TSH 3RD GENERATON 5 488 uIU/mL     Narrative:      Patients undergoing fluorescein dye angiography may retain small amounts of fluorescein in the body for 48-72 hours post procedure  Samples containing fluorescein can produce falsely depressed TSH values  If the patient had this procedure,a specimen should be resubmitted post fluorescein clearance        Lipase [[id number]]  (Normal) Collected: 09/14/21 1432    Lab Status: Final result Specimen: Blood from Arm, Left Updated: 09/14/21 1500     Lipase 134 u/L     Troponin I [[id number]]  (Normal) Collected: 09/14/21 1432    Lab Status: Final result Specimen: Blood from Arm, Left Updated: 09/14/21 1453     Troponin I <0 02 ng/mL     Comprehensive metabolic panel [[id number]]  (Abnormal) Collected: 09/14/21 1432    Lab Status: Final result Specimen: Blood from Arm, Left Updated: 09/14/21 1451     Sodium 139 mmol/L      Potassium 3 6 mmol/L      Chloride 101 mmol/L      CO2 31 mmol/L      ANION GAP 7 mmol/L      BUN 5 mg/dL      Creatinine 0 72 mg/dL      Glucose 84 mg/dL      Calcium 9 2 mg/dL      AST 17 U/L      ALT 23 U/L      Alkaline Phosphatase 58 U/L      Total Protein 8 4 g/dL      Albumin 4 0 g/dL      Total Bilirubin 0 22 mg/dL      eGFR 101 ml/min/1 73sq m     Narrative:      National Kidney Disease Foundation guidelines for Chronic Kidney Disease (CKD):     Stage 1 with normal or high GFR (GFR > 90 mL/min/1 73 square meters)    Stage 2 Mild CKD (GFR = 60-89 mL/min/1 73 square meters)    Stage 3A Moderate CKD (GFR = 45-59 mL/min/1 73 square meters)    Stage 3B Moderate CKD (GFR = 30-44 mL/min/1 73 square meters)    Stage 4 Severe CKD (GFR = 15-29 mL/min/1 73 square meters)    Stage 5 End Stage CKD (GFR <15 mL/min/1 73 square meters)  Note: GFR calculation is accurate only with a steady state creatinine    Urine Microscopic [[id number]]  (Abnormal) Collected: 09/14/21 1332    Lab Status: Final result Specimen: Urine, Other Updated: 09/14/21 1441     RBC, UA None Seen /hpf      WBC, UA None Seen /hpf      Epithelial Cells None Seen /hpf      Bacteria, UA Moderate /hpf     CBC and differential [[id number]] Collected: 09/14/21 1432    Lab Status: Final result Specimen: Blood from Arm, Left Updated: 09/14/21 1437     WBC 6 34 Thousand/uL      RBC 4 24 Million/uL      Hemoglobin 12 8 g/dL      Hematocrit 39 9 %      MCV 94 fL      MCH 30 2 pg      MCHC 32 1 g/dL      RDW 13 1 %      MPV 9 7 fL      Platelets 407 Thousands/uL      nRBC 0 /100 WBCs      Neutrophils Relative 57 %      Immat GRANS % 0 %      Lymphocytes Relative 32 %      Monocytes Relative 8 %      Eosinophils Relative 2 %      Basophils Relative 1 %      Neutrophils Absolute 3 60 Thousands/µL      Immature Grans Absolute 0 02 Thousand/uL      Lymphocytes Absolute 2 01 Thousands/µL      Monocytes Absolute 0 50 Thousand/µL      Eosinophils Absolute 0 15 Thousand/µL      Basophils Absolute 0 06 Thousands/µL     POCT pregnancy, urine [[id number]]  (Normal) Resulted: 09/14/21 1356    Lab Status: Final result Updated: 09/14/21 1357     EXT PREG TEST UR (Ref: Negative) negative     Control valid    Urine Macroscopic, POC [[id number]]  (Abnormal) Collected: 09/14/21 1332    Lab Status: Final result Specimen: Urine Updated: 09/14/21 1333     Color, UA Yellow     Clarity, UA Clear     pH, UA 5 5     Leukocytes, UA Negative     Nitrite, UA Negative     Protein, UA Negative mg/dl      Glucose, UA Negative mg/dl      Ketones, UA Negative mg/dl      Urobilinogen, UA 0 2 E U /dl      Bilirubin, UA Negative     Blood, UA Trace     Specific G[place], UA 1 010    Narrative:      CLINITEK RESULT                 CT abdomen pelvis with contrast   Final Result by Myra Trejo DO (09/14 1911)      No CT evidence of appendicitis or acute intra-abdominal abnormality  Intra- and extrahepatic biliary dilatation, possibly secondary to post cholecystectomy changes  No obstructing etiology  Correlate with any obstructive enzyme levels  6 mm right lower lobe nodule  Based on current Fleischner Society 2017 Guidelines on incidental pulmonary nodule, followup non-contrast CT is recommended at 6-12 months from the initial examination and, if stable at that time, an additional followup is    recommended for 18-24 months from the initial examination  Dilated left pelvic/ovarian veins could represent pelvic congestion syndrome in the appropriate clinical setting  The study was marked in Epic for follow-up        Workstation performed: [id number]         CT renal stone study abdomen pelvis wo contrast   Final Result by Jenny Kathleen MD (09/14 1621)      No nephrolithiasis or hydronephrosis  Appendicoliths  No secondary signs of acute appendicitis  Bile duct dilatation which can be seen with prior cholecystectomy  No other etiology identified  Correlate clinically with liver function tests  If clinically indicated, further evaluation can be obtained with MRCP  Workstation performed: [id number]                    Procedures  Procedures         ED Course  ED Course as of Sep 16 1307   Tue Sep 14, 2021   1726 Blood Pressure: 126/71   1726 Pulse: 80   1726 Respirations: 18   1726 SpO2: 100 %   1726 Temperature: 98 5 °F (36 9 °C)   1726 Normal sinus rhythm  Rightward axis  Borderline ECG  When compared with ECG of 10-MAY-2021 16:21,  QT has lengthened  Confirmed by Nidhi Nolan ([id number]) on 9/14/2021 3:55:34 PM   ECG 12 lead   1728 Reviewed with surgery, recommends CT with contrast                                SBIRT 22yo+      Most Recent Value   SBIRT (23 yo +)   In order to provide better care to our patients, we are screening all of our patients for alcohol and drug use  Would it be okay to ask you these screening questions? No Filed at: 09/14/2021 1428                    MDM     Patient overall well appearing in the ER  See conversation with surgery above  No evidence of appendicitis on repeat CT scan  Reviewed pelvic findings as well as lung nodule with patient in the ER and discussed follow up with PCP and obgyn  Discussed follow up with PCP for elevated TSH  Symptomatic treatment and strict return instructions given  Patient in no acute distress throughout ER stay  Vitals stable and reassuring  Patient stable for discharge at this time  Reviewed plan with patient/family  Reviewed red flag symptoms and strict return instructions  Patient/family voiced understanding and agreement to plan  Patient/family had opportunity to ask questions and all questions were answered at bedside      Disposition  Final diagnoses:   Flank pain   Pelvic congestion syndrome   Lung nodule   Elevated TSH     Time reflects when diagnosis was documented in both MDM as applicable and the Disposition within this note     Time User Action Codes Description Comment    9/14/2021  8:06 PM Merryl Leon Add [R10 9] Flank pain     9/14/2021  8:07 PM Merryl Leon Add [N94 89] Pelvic congestion syndrome     9/14/2021  8:07 PM Merryl Leon Add [R91 1] Lung nodule     9/14/2021  8:07 PM Merryl Leon Add [R79 89] Elevated TSH       ED Disposition     ED Disposition Condition Date/Time Comment    Discharge Stable Tue Sep 14, 2021  8:06 PM Clois Hare discharge to home/self care  Follow-up Information     Follow up With Specialties Details Why Contact Info Additional Information    Lily Cotton MD Family Medicine In 2 days follow up for further evaluation of your symptoms, elevaed TSH and lung nodule [address] [facility]  As discussed follow up with obgyn for further evaluation of your possible pelvic congestion syndrome [address] [address]  [address], [address] [phone]          Discharge Medication List as of 9/14/2021  8:09 PM      CONTINUE these medications which have NOT CHANGED    Details   Ergocalciferol (VITAMIN D2 PO) 50,000 Units, Starting Mon 3/16/2015, Historical Med      levothyroxine (SYNTHROID) 100 mcg tablet Take 100 mcg by mouth daily 100 mcg Monday through Friday 88 Saturday and Sunday, Historical Med      spironolactone (ALDACTONE) 50 mg tablet spironolactone 50 mg tablet, Historical Med      SUMAtriptan (IMITREX) 50 mg tablet Take 50 mg by mouth, Starting Tue 4/11/2017, Historical Med           No discharge procedures on file      PDMP Review     None          ED Provider  Electronically Signed by           Isreal Deng PA-C  09/16/21 [id number]

## 2021-09-14 NOTE — Clinical Note
Charli Abreu was seen and treated in our emergency department on 9/14/2021  Diagnosis:     Elissa Brandt  may return to work on return date  She may return on this date: 09/16/2021         If you have any questions or concerns, please don't hesitate to call        Amina Ramirez PA-C    ______________________________           _______________          _______________  Hospital Representative                              Date                                Time

## 2021-09-20 ENCOUNTER — OFFICE VISIT (OUTPATIENT)
Dept: OBGYN CLINIC | Facility: MEDICAL CENTER | Age: 46
End: 2021-09-20
Payer: COMMERCIAL

## 2021-09-20 VITALS
DIASTOLIC BLOOD PRESSURE: 72 MMHG | SYSTOLIC BLOOD PRESSURE: 120 MMHG | BODY MASS INDEX: 37.56 KG/M2 | WEIGHT: 220 LBS | HEIGHT: 64 IN

## 2021-09-20 DIAGNOSIS — N94.89 PELVIC CONGESTION: Primary | ICD-10-CM

## 2021-09-20 PROBLEM — D62 ACUTE BLOOD LOSS ANEMIA: Status: ACTIVE | Noted: 2021-05-27

## 2021-09-20 PROBLEM — R73.01 IFG (IMPAIRED FASTING GLUCOSE): Status: ACTIVE | Noted: 2021-09-07

## 2021-09-20 PROBLEM — M51.369 LUMBAR DEGENERATIVE DISC DISEASE: Status: ACTIVE | Noted: 2017-01-13

## 2021-09-20 PROBLEM — M51.36 LUMBAR DEGENERATIVE DISC DISEASE: Status: ACTIVE | Noted: 2017-01-13

## 2021-09-20 PROCEDURE — 99202 OFFICE O/P NEW SF 15 MIN: CPT | Performed by: NURSE PRACTITIONER

## 2021-09-20 RX ORDER — PREDNISONE 10 MG/1
TABLET ORAL
COMMUNITY
Start: 2021-07-02

## 2021-09-20 RX ORDER — FERROUS SULFATE 325(65) MG
1 TABLET ORAL
COMMUNITY
Start: 2021-09-15

## 2021-09-20 RX ORDER — VITAMIN E 268 MG
400 CAPSULE ORAL DAILY
COMMUNITY

## 2021-09-20 NOTE — PROGRESS NOTES
Assessment/Plan:      Diagnoses and all orders for this visit:    Pelvic congestion    Other orders  -     FeroSul 325 (65 Fe) MG tablet; Take 1 tablet by mouth daily with breakfast  -     lubiprostone (Amitiza) 24 mcg capsule; TAKE 1 CAPSULE (24 MCG TOTAL) BY MOUTH 2 (TWO) TIMES A DAY WITH MEALS  -     predniSONE 10 mg tablet; PLEASE TAKE 1 TABLET BY MOUTH DAILY UNTIL YOUR NEXT VISIT (Patient not taking: Reported on 2021)  -     vitamin A 3 MG (37690 UT) capsule; Take 10,000 Units by mouth daily  -     vitamin E, tocopherol, 400 units capsule; Take 400 Units by mouth daily        -Reviewed with patient incidental finding of  pelvic congestion syndrome  Patient has no gyn concerns or pelvic pain therefore no intervention necessary   -  Encouraged to continue yearly gyn exams  - signs and symptoms to report reviewed  Encouraged to follow-up with PCP as  scheduled for right flank pain    RTO Annual or sooner as needed     Subjective:     Patient ID: Sebastián Walton is a 55 y o  female  HPI   here follow up ED visit 21  Patient presented to ED with complaints of  Right flank pain  Was discharged with recommendation to follow up with PCP and GYN  LMP 21  Menses are  monthly lasting 3-4  Denies pelvic pain, vaginal discharge, fever, chills, bowel or bladder concerns today  Sexually active  Has tubal ligation for contraception  Patient continues with right feeling pain  Denies any gyn concerns including pelvic pain, vaginal discharge, dyspareunia  Denies urinary urgency, frequency, dysuria    Last pap smear 19 NILM/ HR HPV negative  Last mammogram 21 BiRads 2    Imaging US pelvis- 21  FINDINGS:  ABDOMEN  LOWER CHEST:  6 mm right lower lobe nodule, not included in the field-of-view on prior study  LIVER/BILIARY TREE:  Mild intrahepatic duct dilatation may reflect reservoir effect from prior surgery    CBD at the level of pancreatic head measures 1 cm, upper normal   No obstructing etiology  No focal hepatic lesions  The right hepatic lobe is elongated which 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  GALLBLADDER:  Gallbladder is surgically absent  SPLEEN:  Unremarkable  PANCREAS:  No evidence of peripancreatic inflammatory changes or mass  Minimal prominence of the pancreatic duct in the head at 4 mm without upstream dilatation  ADRENAL GLANDS:  Unremarkable  KIDNEYS/URETERS:  Unremarkable  No hydronephrosis  STOMACH AND BOWEL:    The stomach is poorly distended, evaluation limited  Small bowel is normal caliber  No focal inflammatory changes or fluid collections are identified  APPENDIX:  One or more tiny appendicoliths suggested such as images 49 and 50 series 601 with otherwise top normal caliber appendix and no evidence of periappendiceal fat stranding  ABDOMINOPELVIC CAVITY:  No ascites  No pneumoperitoneum  No lymphadenopathy  VESSELS:  Unremarkable for patient's age  Prominent left parametrial vasculature/ovarian vein which could represent pelvic congestion syndrome in the appropriate clinical setting  PELVIS  REPRODUCTIVE ORGANS:  Unremarkable for patient's age  URINARY BLADDER:  Unremarkable  ABDOMINAL WALL/INGUINAL REGIONS:  Unremarkable  OSSEOUS STRUCTURES:  No acute fracture or destructive osseous lesion  Mild degenerative changes pubic symphysis and bilateral SI joints  Lower lumbar facet arthropathy and degenerative disease L5-S1  IMPRESSION:  No CT evidence of appendicitis or acute intra-abdominal abnormality  Intra- and extrahepatic biliary dilatation, possibly secondary to post cholecystectomy changes  No obstructing etiology  Correlate with any obstructive enzyme levels 6 mm right lower lobe nodule   Based on current Fleischner Society 2017 Guidelines on incidental pulmonary nodule, followup non-contrast CT is recommended at 6-12 months from the initial examination and, if stable at that time, an additional followup is   recommended for 18-24 months from the initial examination  Dilated left pelvic/ovarian veins could represent pelvic congestion syndrome in the appropriate clinical setting        Review of Systems   Constitutional: Negative for chills, fatigue and fever  Respiratory: Negative  Cardiovascular: Negative  Gastrointestinal: Negative for abdominal distention, abdominal pain, anal bleeding, blood in stool, constipation, diarrhea, nausea, rectal pain and vomiting  Right flank pain   Genitourinary: Negative  Objective:  /72   Ht 5' 4" (1 626 m)   Wt 99 8 kg (220 lb)   LMP 08/20/2021   BMI 37 76 kg/m²      Physical Exam  Constitutional:       Appearance: Normal appearance  She is obese  Cardiovascular:      Rate and Rhythm: Normal rate and regular rhythm  Heart sounds: Normal heart sounds  Pulmonary:      Effort: Pulmonary effort is normal       Breath sounds: Normal breath sounds  Abdominal:      General: Abdomen is flat  Palpations: Abdomen is soft  Tenderness: There is right CVA tenderness  There is no left CVA tenderness  Neurological:      Mental Status: She is alert and oriented to person, place, and time     Psychiatric:         Mood and Affect: Mood normal          Behavior: Behavior normal

## 2021-10-27 ENCOUNTER — CLINICAL SUPPORT (OUTPATIENT)
Dept: DENTISTRY | Facility: CLINIC | Age: 46
End: 2021-10-27

## 2021-10-27 VITALS — HEART RATE: 72 BPM | TEMPERATURE: 96.9 F | SYSTOLIC BLOOD PRESSURE: 111 MMHG | DIASTOLIC BLOOD PRESSURE: 72 MMHG

## 2021-10-27 DIAGNOSIS — K03.6 ACCRETIONS ON TEETH: ICD-10-CM

## 2021-10-27 DIAGNOSIS — K03.6 DENTAL CALCULUS: ICD-10-CM

## 2021-10-27 DIAGNOSIS — Z01.20 ENCOUNTER FOR DENTAL EXAMINATION: Primary | ICD-10-CM

## 2021-10-27 DIAGNOSIS — K02.9 DENTAL CARIES: ICD-10-CM

## 2021-10-27 PROCEDURE — D0120 PERIODIC ORAL EVALUATION - ESTABLISHED PATIENT: HCPCS | Performed by: DENTIST

## 2021-10-27 PROCEDURE — D1110 PROPHYLAXIS - ADULT: HCPCS

## 2021-10-27 RX ORDER — RIBOFLAVIN (VITAMIN B2) 100 MG
100 TABLET ORAL DAILY
COMMUNITY

## 2021-11-09 ENCOUNTER — CONSULT (OUTPATIENT)
Dept: DENTISTRY | Facility: CLINIC | Age: 46
End: 2021-11-09

## 2021-11-09 VITALS — TEMPERATURE: 96.6 F

## 2021-11-09 DIAGNOSIS — Z01.20 ENCOUNTER FOR DENTAL EXAMINATION: Primary | ICD-10-CM

## 2021-11-09 PROCEDURE — D9310 CONSULTATION - DIAGNOSTIC SERVICE PROVIDED BY DENTIST OR PHYSICIAN OTHER THAN REQUESTING DENTIST OR PHYSICIAN: HCPCS | Performed by: DENTIST

## 2022-05-08 PROBLEM — R92.2 DENSE BREASTS: Status: ACTIVE | Noted: 2021-10-28

## 2022-05-08 PROBLEM — U07.1 COVID-19: Status: ACTIVE | Noted: 2022-01-20

## 2022-05-08 PROBLEM — I26.99 PULMONARY EMBOLUS (HCC): Status: ACTIVE | Noted: 2022-01-19

## 2022-05-08 PROBLEM — D75.839 THROMBOCYTOSIS: Status: ACTIVE | Noted: 2022-01-20

## 2022-05-08 PROBLEM — R92.30 DENSE BREASTS: Status: ACTIVE | Noted: 2021-10-28

## 2022-05-08 PROBLEM — M06.09 RHEUMATOID ARTHRITIS OF MULTIPLE SITES WITH NEGATIVE RHEUMATOID FACTOR (HCC): Status: ACTIVE | Noted: 2021-12-07

## 2022-05-08 PROBLEM — R05.9 COUGH: Status: ACTIVE | Noted: 2020-11-25

## 2022-05-08 PROBLEM — Z76.89 ENCOUNTER FOR WEIGHT MANAGEMENT: Status: ACTIVE | Noted: 2022-04-07

## 2022-06-14 ENCOUNTER — OFFICE VISIT (OUTPATIENT)
Dept: DENTISTRY | Facility: CLINIC | Age: 47
End: 2022-06-14

## 2022-06-14 VITALS — TEMPERATURE: 99.6 F | SYSTOLIC BLOOD PRESSURE: 123 MMHG | DIASTOLIC BLOOD PRESSURE: 80 MMHG | HEART RATE: 87 BPM

## 2022-06-14 DIAGNOSIS — Z01.20 ENCOUNTER FOR DENTAL EXAMINATION: Primary | ICD-10-CM

## 2022-06-14 PROCEDURE — D0210 INTRAORAL - COMPLETE SERIES OF RADIOGRAPHIC IMAGES: HCPCS | Performed by: DENTAL HYGIENIST

## 2022-06-14 PROCEDURE — D0120 PERIODIC ORAL EVALUATION - ESTABLISHED PATIENT: HCPCS | Performed by: DENTIST

## 2022-06-14 RX ORDER — APIXABAN 5 MG/1
TABLET, FILM COATED ORAL
COMMUNITY
Start: 2022-06-05

## 2022-06-14 NOTE — PROGRESS NOTES
Dental procedures in this visit     - PERIODIC ORAL EVALUATION - ESTABLISHED PATIENT (Completed)     Service provider: Xena Prado DDS     Billing provider: Xena Prado DDS    [address] (Completed)     Service provider: Aquiles Daniels [address]     Billing provider: Xena Prado DDS     Subjective   Patient ID: Duane Stains is a 55 y o  female  Chief Complaint   Patient presents with    Routine Oral Cleaning    Periodic Exam     ASA  II, Pain - 0;  Reviewed M/DH    Periodic Exam     Exams:  Periodic exam and perio charted  Xrays:     FMX    Type of Treatment:     Reviewed OHI  Brush:  2X/day and Floss 1X/day  EO/OCS Exams:  No significant findings  IO:   Cristina davis  Oral Hygiene:   Fair  /Poor  Plaque:  Light  / Moderate   Calculus:  Light  / Moderate    Bleeding:  Light   Gingiva:  Pink  / Firm/  Red  /  Inflamed  Stain:  none  Perio Charting:  Periocharting was completed and evaluated  1-6  mm w/ light BUP   Perio Findings:  Chronic Mild to Moderate Periodontitis  Caries Findings:  No decay  Caries Risk Assessment:    Moderate caries risk    Treatment Plan:  Updated    Dr  Exam:  Dr Shari Fregoso  Referral:  No referral given   NV1:  SRP's UR/LR - 75 min  NV2:  SRP's UL/LL - 75 min    Pt needs pre-auth for SRP's  Please give her an HiMom julisa

## 2022-10-11 PROBLEM — R05.9 COUGH: Status: RESOLVED | Noted: 2020-11-25 | Resolved: 2022-10-11

## 2022-12-29 ENCOUNTER — HOSPITAL ENCOUNTER (EMERGENCY)
Facility: HOSPITAL | Age: 47
Discharge: HOME | End: 2022-12-29
Attending: EMERGENCY MEDICINE

## 2022-12-29 ENCOUNTER — APPOINTMENT (EMERGENCY)
Dept: RADIOLOGY | Facility: HOSPITAL | Age: 47
End: 2022-12-29

## 2022-12-29 VITALS
DIASTOLIC BLOOD PRESSURE: 63 MMHG | HEIGHT: 67 IN | HEART RATE: 70 BPM | TEMPERATURE: 99 F | WEIGHT: 225.31 LBS | OXYGEN SATURATION: 96 % | SYSTOLIC BLOOD PRESSURE: 116 MMHG | BODY MASS INDEX: 35.36 KG/M2 | RESPIRATION RATE: 17 BRPM

## 2022-12-29 DIAGNOSIS — R19.7 NAUSEA VOMITING AND DIARRHEA: Primary | ICD-10-CM

## 2022-12-29 DIAGNOSIS — R11.2 NAUSEA VOMITING AND DIARRHEA: Primary | ICD-10-CM

## 2022-12-29 DIAGNOSIS — B34.9 VIRAL SYNDROME: ICD-10-CM

## 2022-12-29 LAB
ALBUMIN SERPL BCP-MCNC: 3.6 G/DL (ref 3.5–5)
ALP SERPL-CCNC: 65 U/L (ref 46–116)
ALT SERPL W P-5'-P-CCNC: 24 U/L (ref 12–78)
ANION GAP SERPL CALCULATED.3IONS-SCNC: 9 MMOL/L (ref 4–13)
APTT PPP: 29 SECONDS (ref 23–37)
AST SERPL W P-5'-P-CCNC: 15 U/L (ref 5–45)
BACTERIA UR QL AUTO: ABNORMAL /HPF
BASOPHILS # BLD AUTO: 0.06 THOUSANDS/ÂΜL (ref 0–0.1)
BASOPHILS NFR BLD AUTO: 1 % (ref 0–1)
BILIRUB SERPL-MCNC: 0.2 MG/DL (ref 0.2–1)
BILIRUB UR QL STRIP: NEGATIVE
BUN SERPL-MCNC: 7 MG/DL (ref 5–25)
CALCIUM SERPL-MCNC: 9.1 MG/DL (ref 8.3–10.1)
CHLORIDE SERPL-SCNC: 102 MMOL/L (ref 96–108)
CLARITY UR: CLEAR
CO2 SERPL-SCNC: 29 MMOL/L (ref 21–32)
COLOR UR: YELLOW
CREAT SERPL-MCNC: 0.68 MG/DL (ref 0.6–1.3)
EOSINOPHIL # BLD AUTO: 0.1 THOUSAND/ÂΜL (ref 0–0.61)
EOSINOPHIL NFR BLD AUTO: 1 % (ref 0–6)
ERYTHROCYTE [DISTWIDTH] IN BLOOD BY AUTOMATED COUNT: 13.2 % (ref 11.6–15.1)
EXT PREGNANCY TEST URINE: NEGATIVE
EXT. CONTROL: NORMAL
FLUAV RNA RESP QL NAA+PROBE: NEGATIVE
FLUBV RNA RESP QL NAA+PROBE: NEGATIVE
GFR SERPL CREATININE-BSD FRML MDRD: 104 ML/MIN/1.73SQ M
GLUCOSE SERPL-MCNC: 85 MG/DL (ref 65–140)
GLUCOSE UR STRIP-MCNC: NEGATIVE MG/DL
HCT VFR BLD AUTO: 36.3 % (ref 34.8–46.1)
HGB BLD-MCNC: 11.4 G/DL (ref 11.5–15.4)
HGB UR QL STRIP.AUTO: ABNORMAL
IMM GRANULOCYTES # BLD AUTO: 0.04 THOUSAND/UL (ref 0–0.2)
IMM GRANULOCYTES NFR BLD AUTO: 1 % (ref 0–2)
INR PPP: 1.05 (ref 0.84–1.19)
KETONES UR STRIP-MCNC: NEGATIVE MG/DL
LACTATE SERPL-SCNC: 1.8 MMOL/L (ref 0.5–2)
LEUKOCYTE ESTERASE UR QL STRIP: NEGATIVE
LYMPHOCYTES # BLD AUTO: 2.1 THOUSANDS/ÂΜL (ref 0.6–4.47)
LYMPHOCYTES NFR BLD AUTO: 27 % (ref 14–44)
MAGNESIUM SERPL-MCNC: 1.9 MG/DL (ref 1.6–2.6)
MCH RBC QN AUTO: 28.8 PG (ref 26.8–34.3)
MCHC RBC AUTO-ENTMCNC: 31.4 G/DL (ref 31.4–37.4)
MCV RBC AUTO: 92 FL (ref 82–98)
MONOCYTES # BLD AUTO: 0.69 THOUSAND/ÂΜL (ref 0.17–1.22)
MONOCYTES NFR BLD AUTO: 9 % (ref 4–12)
NEUTROPHILS # BLD AUTO: 4.77 THOUSANDS/ÂΜL (ref 1.85–7.62)
NEUTS SEG NFR BLD AUTO: 61 % (ref 43–75)
NITRITE UR QL STRIP: NEGATIVE
NON-SQ EPI CELLS URNS QL MICRO: ABNORMAL /HPF
NRBC BLD AUTO-RTO: 0 /100 WBCS
PH UR STRIP.AUTO: 6 [PH] (ref 4.5–8)
PLATELET # BLD AUTO: 356 THOUSANDS/UL (ref 149–390)
PMV BLD AUTO: 10.7 FL (ref 8.9–12.7)
POTASSIUM SERPL-SCNC: 4.1 MMOL/L (ref 3.5–5.3)
PROT SERPL-MCNC: 8.4 G/DL (ref 6.4–8.4)
PROT UR STRIP-MCNC: NEGATIVE MG/DL
PROTHROMBIN TIME: 13.8 SECONDS (ref 11.6–14.5)
RBC # BLD AUTO: 3.96 MILLION/UL (ref 3.81–5.12)
RBC #/AREA URNS AUTO: ABNORMAL /HPF
RSV RNA RESP QL NAA+PROBE: NEGATIVE
SARS-COV-2 RNA RESP QL NAA+PROBE: NEGATIVE
SODIUM SERPL-SCNC: 140 MMOL/L (ref 135–147)
SP GR UR STRIP.AUTO: 1.01 (ref 1–1.03)
UROBILINOGEN UR QL STRIP.AUTO: 0.2 E.U./DL
WBC # BLD AUTO: 7.76 THOUSAND/UL (ref 4.31–10.16)
WBC #/AREA URNS AUTO: ABNORMAL /HPF

## 2022-12-29 RX ORDER — DICYCLOMINE HCL 20 MG
20 TABLET ORAL ONCE
Status: COMPLETED | OUTPATIENT
Start: 2022-12-29 | End: 2022-12-29

## 2022-12-29 RX ORDER — DICYCLOMINE HCL 20 MG
20 TABLET ORAL 2 TIMES DAILY PRN
Qty: 4 TABLET | Refills: 0 | Status: SHIPPED | OUTPATIENT
Start: 2022-12-29 | End: 2022-12-31

## 2022-12-29 RX ORDER — ONDANSETRON 2 MG/ML
4 INJECTION INTRAMUSCULAR; INTRAVENOUS ONCE
Status: COMPLETED | OUTPATIENT
Start: 2022-12-29 | End: 2022-12-29

## 2022-12-29 RX ORDER — ONDANSETRON 4 MG/1
4 TABLET, ORALLY DISINTEGRATING ORAL EVERY 8 HOURS PRN
Qty: 6 TABLET | Refills: 0 | Status: SHIPPED | OUTPATIENT
Start: 2022-12-29 | End: 2022-12-31

## 2022-12-29 RX ADMIN — SODIUM CHLORIDE 1000 ML: 0.9 INJECTION, SOLUTION INTRAVENOUS at 18:07

## 2022-12-29 RX ADMIN — DICYCLOMINE HYDROCHLORIDE 20 MG: 20 TABLET ORAL at 20:20

## 2022-12-29 RX ADMIN — ONDANSETRON 4 MG: 2 INJECTION INTRAMUSCULAR; INTRAVENOUS at 18:08

## 2022-12-29 NOTE — ED PROVIDER NOTES
History  Chief Complaint   Patient presents with   • Vomiting     Vomiting and diarrhea that started 3 days ago     Patient is a 66-year-old female and for day for 3 days of nausea, vomiting diarrhea and cough  Patient does have a history of hypothyroidism as well as PE after COVID on anticoagulation  She has no recent travel, sick contacts or recent antibiotic use  She has received the COVID and flu vaccines  She states 3 days ago she started with diarrhea  Has progressed to vomiting and cough  She has nonproductive cough  She has subjective fevers and chills  Diffuse abdominal discomfort but pain after vomiting and/or diarrhea  She denies any melena, bright red blood per rectum or hematemesis  He has no chest pain, shortness of breath, palpitations or syncope  History provided by:  Patient   used: No    Vomiting  Severity:  Moderate  Duration:  3 days  Timing:  Intermittent  Quality:  Unable to specify  Progression:  Unchanged  Chronicity:  New  Recent urination:  Normal  Context: not post-tussive and not self-induced    Relieved by:  None tried  Worsened by:  Nothing  Ineffective treatments:  None tried  Associated symptoms: abdominal pain, arthralgias, chills, cough, diarrhea and fever    Associated symptoms: no headaches, no myalgias, no sore throat and no URI    Risk factors: no alcohol use, no diabetes, not pregnant, no prior abdominal surgery, no sick contacts, no suspect food intake and no travel to endemic areas        Prior to Admission Medications   Prescriptions Last Dose Informant Patient Reported? Taking?    Ascorbic Acid (vitamin C) 100 MG tablet 2022  Yes Yes   Sig: Take 100 mg by mouth daily   Eliquis 5 MG 2022  Yes Yes   Ergocalciferol (VITAMIN D2 PO) 2022  Yes Yes   Si,000 Units   FeroSul 325 (65 Fe) MG tablet 2022  Yes Yes   Sig: Take 1 tablet by mouth daily with breakfast   SUMAtriptan (IMITREX) 50 mg tablet Not Taking  Yes No Sig: Take 50 mg by mouth   Patient not taking: Reported on 2022   levothyroxine 100 mcg tablet 2022  Yes Yes   Sig: Take 100 mcg by mouth daily 100 mcg Monday through Friday 88 Saturday and    lubiprostone (AMITIZA) 24 mcg capsule 2022  Yes Yes   Sig: TAKE 1 CAPSULE (24 MCG TOTAL) BY MOUTH 2 (TWO) TIMES A DAY WITH MEALS   predniSONE 10 mg tablet Not Taking  Yes No   Sig: PLEASE TAKE 1 TABLET BY MOUTH DAILY UNTIL YOUR NEXT VISIT   Patient not taking: No sig reported   spironolactone (ALDACTONE) 50 mg tablet 2022  Yes Yes   Sig: spironolactone 50 mg tablet   vitamin A 3 MG (16534 UT) capsule 2022  Yes Yes   Sig: Take 10,000 Units by mouth daily   vitamin E, tocopherol, 400 units capsule 2022  Yes Yes   Sig: Take 400 Units by mouth daily      Facility-Administered Medications: None       Past Medical History:   Diagnosis Date   • Disease of thyroid gland    • Scoliosis        Past Surgical History:   Procedure Laterality Date   •  SECTION     • CHOLECYSTECTOMY         Family History   Problem Relation Age of Onset   • Diabetes Mother      I have reviewed and agree with the history as documented  E-Cigarette/Vaping   • E-Cigarette Use Never User      E-Cigarette/Vaping Substances     Social History     Tobacco Use   • Smoking status: Never   • Smokeless tobacco: Never   Vaping Use   • Vaping Use: Never used   Substance Use Topics   • Alcohol use: No   • Drug use: No       Review of Systems   Constitutional: Positive for chills and fever  HENT: Negative  Negative for sore throat  Eyes: Negative  Respiratory: Positive for cough  Gastrointestinal: Positive for abdominal pain, diarrhea and vomiting  Endocrine: Negative  Genitourinary: Negative  Musculoskeletal: Positive for arthralgias  Negative for myalgias  Neurological: Negative  Negative for headaches  Hematological: Negative  Psychiatric/Behavioral: Negative      All other systems reviewed and are negative  Physical Exam  Physical Exam  Vitals and nursing note reviewed  Constitutional:       General: She is not in acute distress  Appearance: She is well-developed  HENT:      Head: Normocephalic and atraumatic  Comments: Patient maintaining airway and secretions  No stridor   No brawniness under tongue  Midline without edema  Dry mucous membranes  Mouth/Throat:      Mouth: Mucous membranes are dry  Eyes:      Extraocular Movements: Extraocular movements intact  Conjunctiva/sclera: Conjunctivae normal       Pupils: Pupils are equal, round, and reactive to light  Cardiovascular:      Rate and Rhythm: Regular rhythm  Tachycardia present  Pulses:           Radial pulses are 2+ on the right side and 2+ on the left side  Dorsalis pedis pulses are 2+ on the right side  Heart sounds: Normal heart sounds, S1 normal and S2 normal  No murmur heard  Pulmonary:      Effort: Pulmonary effort is normal  No respiratory distress  Breath sounds: Normal breath sounds  Abdominal:      General: Abdomen is protuberant  Bowel sounds are normal       Palpations: Abdomen is soft  Tenderness: There is generalized abdominal tenderness  There is no right CVA tenderness, left CVA tenderness, guarding or rebound  Negative signs include Watson's sign and Rovsing's sign  Musculoskeletal:         General: No swelling  Normal range of motion  Cervical back: Neck supple  Right lower leg: No edema  Left lower leg: No edema  Skin:     General: Skin is warm and dry  Capillary Refill: Capillary refill takes less than 2 seconds  Neurological:      General: No focal deficit present  Mental Status: She is alert and oriented to person, place, and time  GCS: GCS eye subscore is 4  GCS verbal subscore is 5  GCS motor subscore is 6  Cranial Nerves: Cranial nerves 2-12 are intact  Sensory: Sensation is intact        Motor: Motor function is intact  Coordination: Coordination is intact  Gait: Gait is intact  Psychiatric:         Mood and Affect: Mood normal          Behavior: Behavior normal          Thought Content: Thought content normal          Judgment: Judgment normal          Vital Signs  ED Triage Vitals [12/29/22 1622]   Temperature Pulse Respirations Blood Pressure SpO2   99 °F (37 2 °C) 104 18 166/79 98 %      Temp Source Heart Rate Source Patient Position - Orthostatic VS BP Location FiO2 (%)   Oral Monitor Sitting Right arm --      Pain Score       5           Vitals:    12/29/22 1622 12/29/22 1829 12/29/22 2023   BP: 166/79 129/63 116/63   Pulse: 104 92 70   Patient Position - Orthostatic VS: Sitting Lying Lying         Visual Acuity      ED Medications  Medications   sodium chloride 0 9 % bolus 1,000 mL (0 mL Intravenous Stopped 12/29/22 1915)   ondansetron (ZOFRAN) injection 4 mg (4 mg Intravenous Given 12/29/22 1808)   dicyclomine (BENTYL) tablet 20 mg (20 mg Oral Given 12/29/22 2020)       Diagnostic Studies  Results Reviewed     Procedure Component Value Units Date/Time    Lactic acid [[id number]]  (Normal) Collected: 12/29/22 1809    Lab Status: Final result Specimen: Blood from Arm, Left Updated: 12/29/22 1928     LACTIC ACID 1 8 mmol/L     Narrative:      Result may be elevated if tourniquet was used during collection      Urine Microscopic [[id number]]  (Abnormal) Collected: 12/29/22 1730    Lab Status: Final result Specimen: Urine, Clean Catch Updated: 12/29/22 1919     RBC, UA None Seen /hpf      WBC, UA 0-1 /hpf      Epithelial Cells Occasional /hpf      Bacteria, UA Occasional /hpf     Comprehensive metabolic panel [[id number]] Collected: 12/29/22 1809    Lab Status: Final result Specimen: Blood from Arm, Left Updated: 12/29/22 1852     Sodium 140 mmol/L      Potassium 4 1 mmol/L      Chloride 102 mmol/L      CO2 29 mmol/L      ANION GAP 9 mmol/L      BUN 7 mg/dL      Creatinine 0 68 mg/dL      Glucose 85 mg/dL Calcium 9 1 mg/dL      AST 15 U/L      ALT 24 U/L      Alkaline Phosphatase 65 U/L      Total Protein 8 4 g/dL      Albumin 3 6 g/dL      Total Bilirubin 0 20 mg/dL      eGFR 104 ml/min/1 73sq m     Narrative:      [place] guidelines for Chronic Kidney Disease (CKD):   •  Stage 1 with normal or high GFR (GFR > 90 mL/min/1 73 square meters)  •  Stage 2 Mild CKD (GFR = 60-89 mL/min/1 73 square meters)  •  Stage 3A Moderate CKD (GFR = 45-59 mL/min/1 73 square meters)  •  Stage 3B Moderate CKD (GFR = 30-44 mL/min/1 73 square meters)  •  Stage 4 Severe CKD (GFR = 15-29 mL/min/1 73 square meters)  •  Stage 5 End Stage CKD (GFR <15 mL/min/1 73 square meters)  Note: GFR calculation is accurate only with a steady state creatinine    Magnesium [[id number]]  (Normal) Collected: 12/29/22 1809    Lab Status: Final result Specimen: Blood from Arm, Left Updated: 12/29/22 1852     Magnesium 1 9 mg/dL     Protime-INR [[id number]]  (Normal) Collected: 12/29/22 1817    Lab Status: Final result Specimen: Blood from Arm, Left Updated: 12/29/22 1850     Protime 13 8 seconds      INR 1 05    APTT [[id number]]  (Normal) Collected: 12/29/22 1817    Lab Status: Final result Specimen: Blood from Arm, Left Updated: 12/29/22 1850     PTT 29 seconds     FLU/RSV/COVID - if FLU/RSV clinically relevant [[id number]]  (Normal) Collected: 12/29/22 1734    Lab Status: Final result Specimen: Nares from Nose Updated: 12/29/22 1835     SARS-CoV-2 Negative     INFLUENZA A PCR Negative     INFLUENZA B PCR Negative     RSV PCR Negative    Narrative:      FOR PEDIATRIC PATIENTS - copy/paste COVID Guidelines URL to browser: [email or web address]    SARS-CoV-2 assay is a Nucleic Acid Amplification assay intended for the  qualitative detection of nucleic acid from SARS-CoV-2 in nasopharyngeal  swabs  Results are for the presumptive identification of SARS-CoV-2 RNA      Positive results are indicative of infection with SARS-CoV-2, the virus  causing COVID-19, but do not rule out bacterial infection or co-infection  with other viruses  Laboratories within the [place] and its  territories are required to report all positive results to the appropriate  public health authorities  Negative results do not preclude SARS-CoV-2  infection and should not be used as the sole basis for treatment or other  patient management decisions  Negative results must be combined with  clinical observations, patient history, and epidemiological information  This test has not been FDA cleared or approved  This test has been authorized by FDA under an Emergency Use Authorization  (EUA)  This test is only authorized for the duration of time the  declaration that circumstances exist justifying the authorization of the  emergency use of an in vitro diagnostic tests for detection of SARS-CoV-2  virus and/or diagnosis of COVID-19 infection under section 564(b)(1) of  the Act, 21 U  S C  391QSE-4(T)(3), unless the authorization is terminated  or revoked sooner  The test has been validated but independent review by FDA  and CLIA is pending  Test performed using [employer] GeneXpert: This RT-PCR assay targets N2,  a region unique to SARS-CoV-2  A conserved region in the E-gene was chosen  for pan-Sarbecovirus detection which includes SARS-CoV-2  According to CMS-2020-01-R, this platform meets the definition of high-throughput technology      CBC and differential [[id number]]  (Abnormal) Collected: 12/29/22 1809    Lab Status: Final result Specimen: Blood from Arm, Left Updated: 12/29/22 1823     WBC 7 76 Thousand/uL      RBC 3 96 Million/uL      Hemoglobin 11 4 g/dL      Hematocrit 36 3 %      MCV 92 fL      MCH 28 8 pg      MCHC 31 4 g/dL      RDW 13 2 %      MPV 10 7 fL      Platelets 631 Thousands/uL      nRBC 0 /100 WBCs      Neutrophils Relative 61 %      Immat GRANS % 1 %      Lymphocytes Relative 27 % Monocytes Relative 9 %      Eosinophils Relative 1 %      Basophils Relative 1 %      Neutrophils Absolute 4 77 Thousands/µL      Immature Grans Absolute 0 04 Thousand/uL      Lymphocytes Absolute 2 10 Thousands/µL      Monocytes Absolute 0 69 Thousand/µL      Eosinophils Absolute 0 10 Thousand/µL      Basophils Absolute 0 06 Thousands/µL     POCT pregnancy, urine [[id number]]  (Normal) Resulted: 12/29/22 1734    Lab Status: Final result Updated: 12/29/22 1734     EXT Preg Test, Ur Negative     Control Valid    Urine Macroscopic, POC [[id number]]  (Abnormal) Collected: 12/29/22 1730    Lab Status: Final result Specimen: Urine Updated: 12/29/22 1731     Color, UA Yellow     Clarity, UA Clear     pH, UA 6 0     Leukocytes, UA Negative     Nitrite, UA Negative     Protein, UA Negative mg/dl      Glucose, UA Negative mg/dl      Ketones, UA Negative mg/dl      Urobilinogen, UA 0 2 E U /dl      Bilirubin, UA Negative     Occult Blood, UA Small     Specific [place], UA 1 015    Narrative:      [facility] RESULT                 XR chest 1 view portable    (Results Pending)              Procedures  Procedures         ED Course  ED Course as of 12/29/22 2027   Thu Dec 29, 2022   1738 Patient is a 80-year-old female with multiple comorbidities coming in today with vomiting and diarrhea  On exam she does have a dry cough no peritoneal signs and no respiratory distress  Is mildly tachycardic but does appear dehydrated with dry mucous membranes    We will start medical work-up including CBC CMP lactic, urine as well as chest x-ray and test for flu COVID      Disclosure: Voice to text software was used in the preparation of this document and could have resulted in translational errors       Occasional wrong word or "sound a like" substitutions may have occurred due to the inherent limitations of voice recognition software   Read the chart carefully and recognize, using context, where substitutions have occurred         1903 Patient's labs look well  No evidence of septicemia or endorgan damage  No leukocytosis and flu negative  1912 Chest   x-ray clear   2011 Patient resting in bed in no distress  Feels better  No v/d here in ed  Cough improved  Will give bentyl here as well as for home and zofran  SBIRT 22yo+    Flowsheet Row Most Recent Value   SBIRT (25 yo +)    In order to provide better care to our patients, we are screening all of our patients for alcohol and drug use  Would it be okay to ask you these screening questions? Yes Filed at: 12/29/2022 1727   Initial Alcohol Screen: US AUDIT-C     1  How often do you have a drink containing alcohol? 0 Filed at: 12/29/2022 1727   2  How many drinks containing alcohol do you have on a typical day you are drinking? 0 Filed at: 12/29/2022 1727   3a  Male UNDER 65: How often do you have five or more drinks on one occasion? 0 Filed at: 12/29/2022 1727   3b  FEMALE Any Age, or MALE 65+: How often do you have 4 or more drinks on one occassion? 0 Filed at: 12/29/2022 1727   Audit-C Score 0 Filed at: 12/29/2022 1727   LYNNE: How many times in the past year have you    Used an illegal drug or used a prescription medication for non-medical reasons?  Never Filed at: 12/29/2022 1727                    MDM  Number of Diagnoses or Management Options  Diagnosis management comments:     Differential diagnosis includes but not limited to:  Viral etiology, biliary colic, cholecystitis, obstruction, gastritis, diabetic gastroparesis, appendicitis, hepatitis, mi, AFib, torsion, kidney stones, DKA, increased ICP, meningitis, withdrawal from medication, pregnancy, psychogenic, radiation, migraines, labyrinthitis, Meniere's    Differential diagnosis of cough includes but not limited to, influenza, parainfluenza, other viruses, pneumonia/bacterial pneumonia, bronchitis, medication related, toxic, asthma, asthma exacerbation, CHF, COPD, GERD acid reflux, postnasal drip, allergies foreign body aspiration, otitis media, pharyngitis viral and/or bacterial         Amount and/or Complexity of Data Reviewed  Clinical lab tests: ordered and reviewed  Tests in the radiology section of CPT®: ordered and reviewed  Tests in the medicine section of CPT®: ordered and reviewed  Independent visualization of images, tracings, or specimens: yes        Disposition  Final diagnoses:   Nausea vomiting and diarrhea   Viral syndrome     Time reflects when diagnosis was documented in both MDM as applicable and the Disposition within this note     Time User Action Codes Description Comment    12/29/2022  8:12 PM Dylan Kime Add [R11 2,  R19 7] Nausea vomiting and diarrhea     12/29/2022  8:12 PM Brente Marques Add [B34 9] Viral syndrome       ED Disposition     ED Disposition   Discharge    Condition   Stable    Date/Time   Thu Dec 29, 2022  8:13 PM    Comment   Kathi Duy discharge to home/self care  Follow-up Information     Follow up With Specialties Details Why Leonel Krause MD Family Medicine Schedule an appointment as soon as possible for a visit in 1 week  [address]  [address]            Patient's Medications   Discharge Prescriptions    DICYCLOMINE (BENTYL) 20 MG TABLET    Take 1 tablet (20 mg total) by mouth 2 (two) times a day as needed (abd pain) for up to 2 days       Start Date: 12/29/2022End Date: 12/31/2022       Order Dose: 20 mg       Quantity: 4 tablet    Refills: 0    ONDANSETRON (ZOFRAN ODT) 4 MG DISINTEGRATING TABLET    Take 1 tablet (4 mg total) by mouth every 8 (eight) hours as needed for nausea or vomiting for up to 2 days       Start Date: 12/29/2022End Date: 12/31/2022       Order Dose: 4 mg       Quantity: 6 tablet    Refills: 0       No discharge procedures on file      PDMP Review     None          ED Provider  Electronically Signed by           Taylor Schmitt DO  12/29/22 2027

## 2023-07-01 ENCOUNTER — APPOINTMENT (EMERGENCY)
Dept: CT IMAGING | Facility: HOSPITAL | Age: 48
End: 2023-07-01
Payer: COMMERCIAL

## 2023-07-01 ENCOUNTER — HOSPITAL ENCOUNTER (EMERGENCY)
Facility: HOSPITAL | Age: 48
Discharge: HOME | End: 2023-07-01
Attending: EMERGENCY MEDICINE
Payer: COMMERCIAL

## 2023-07-01 VITALS
BODY MASS INDEX: 35.18 KG/M2 | WEIGHT: 224.65 LBS | TEMPERATURE: 98.4 F | RESPIRATION RATE: 16 BRPM | SYSTOLIC BLOOD PRESSURE: 102 MMHG | HEART RATE: 67 BPM | DIASTOLIC BLOOD PRESSURE: 52 MMHG | OXYGEN SATURATION: 100 %

## 2023-07-01 DIAGNOSIS — R11.10 VOMITING AND DIARRHEA: ICD-10-CM

## 2023-07-01 DIAGNOSIS — K52.9 COLITIS: Primary | ICD-10-CM

## 2023-07-01 DIAGNOSIS — R19.7 VOMITING AND DIARRHEA: ICD-10-CM

## 2023-07-01 LAB
ALBUMIN SERPL BCP-MCNC: 4.1 G/DL (ref 3.5–5)
ALP SERPL-CCNC: 59 U/L (ref 34–104)
ALT SERPL W P-5'-P-CCNC: 11 U/L (ref 7–52)
ANION GAP SERPL CALCULATED.3IONS-SCNC: 8 MMOL/L
AST SERPL W P-5'-P-CCNC: 13 U/L (ref 13–39)
BACTERIA UR QL AUTO: ABNORMAL /HPF
BASOPHILS # BLD AUTO: 0.04 THOUSANDS/ÂΜL (ref 0–0.1)
BASOPHILS NFR BLD AUTO: 1 % (ref 0–1)
BILIRUB SERPL-MCNC: 0.26 MG/DL (ref 0.2–1)
BILIRUB UR QL STRIP: NEGATIVE
BUN SERPL-MCNC: 8 MG/DL (ref 5–25)
CALCIUM SERPL-MCNC: 8.9 MG/DL (ref 8.4–10.2)
CHLORIDE SERPL-SCNC: 104 MMOL/L (ref 96–108)
CLARITY UR: CLEAR
CO2 SERPL-SCNC: 24 MMOL/L (ref 21–32)
COLOR UR: YELLOW
CREAT SERPL-MCNC: 0.63 MG/DL (ref 0.6–1.3)
EOSINOPHIL # BLD AUTO: 0.03 THOUSAND/ÂΜL (ref 0–0.61)
EOSINOPHIL NFR BLD AUTO: 0 % (ref 0–6)
ERYTHROCYTE [DISTWIDTH] IN BLOOD BY AUTOMATED COUNT: 14.1 % (ref 11.6–15.1)
GFR SERPL CREATININE-BSD FRML MDRD: 107 ML/MIN/1.73SQ M
GLUCOSE SERPL-MCNC: 104 MG/DL (ref 65–140)
GLUCOSE UR STRIP-MCNC: NEGATIVE MG/DL
HCT VFR BLD AUTO: 36.2 % (ref 34.8–46.1)
HGB BLD-MCNC: 10.7 G/DL (ref 11.5–15.4)
HGB UR QL STRIP.AUTO: ABNORMAL
IMM GRANULOCYTES # BLD AUTO: 0.04 THOUSAND/UL (ref 0–0.2)
IMM GRANULOCYTES NFR BLD AUTO: 1 % (ref 0–2)
KETONES UR STRIP-MCNC: NEGATIVE MG/DL
LEUKOCYTE ESTERASE UR QL STRIP: ABNORMAL
LIPASE SERPL-CCNC: 22 U/L (ref 11–82)
LYMPHOCYTES # BLD AUTO: 1.28 THOUSANDS/ÂΜL (ref 0.6–4.47)
LYMPHOCYTES NFR BLD AUTO: 16 % (ref 14–44)
MCH RBC QN AUTO: 26.2 PG (ref 26.8–34.3)
MCHC RBC AUTO-ENTMCNC: 29.6 G/DL (ref 31.4–37.4)
MCV RBC AUTO: 89 FL (ref 82–98)
MONOCYTES # BLD AUTO: 0.46 THOUSAND/ÂΜL (ref 0.17–1.22)
MONOCYTES NFR BLD AUTO: 6 % (ref 4–12)
NEUTROPHILS # BLD AUTO: 6.02 THOUSANDS/ÂΜL (ref 1.85–7.62)
NEUTS SEG NFR BLD AUTO: 76 % (ref 43–75)
NITRITE UR QL STRIP: NEGATIVE
NON-SQ EPI CELLS URNS QL MICRO: ABNORMAL /HPF
NRBC BLD AUTO-RTO: 0 /100 WBCS
PH UR STRIP.AUTO: 6 [PH] (ref 4.5–8)
PLATELET # BLD AUTO: 401 THOUSANDS/UL (ref 149–390)
PMV BLD AUTO: 9.9 FL (ref 8.9–12.7)
POTASSIUM SERPL-SCNC: 3.8 MMOL/L (ref 3.5–5.3)
PROT SERPL-MCNC: 7.6 G/DL (ref 6.4–8.4)
PROT UR STRIP-MCNC: NEGATIVE MG/DL
RBC # BLD AUTO: 4.08 MILLION/UL (ref 3.81–5.12)
RBC #/AREA URNS AUTO: ABNORMAL /HPF
SODIUM SERPL-SCNC: 136 MMOL/L (ref 135–147)
SP GR UR STRIP.AUTO: <=1.005 (ref 1–1.03)
UROBILINOGEN UR QL STRIP.AUTO: 0.2 E.U./DL
WBC # BLD AUTO: 7.87 THOUSAND/UL (ref 4.31–10.16)
WBC #/AREA URNS AUTO: ABNORMAL /HPF

## 2023-07-01 PROCEDURE — 99284 EMERGENCY DEPT VISIT MOD MDM: CPT

## 2023-07-01 PROCEDURE — 85025 COMPLETE CBC W/AUTO DIFF WBC: CPT | Performed by: EMERGENCY MEDICINE

## 2023-07-01 PROCEDURE — 81001 URINALYSIS AUTO W/SCOPE: CPT

## 2023-07-01 PROCEDURE — G1004 CDSM NDSC: HCPCS

## 2023-07-01 PROCEDURE — 96374 THER/PROPH/DIAG INJ IV PUSH: CPT

## 2023-07-01 PROCEDURE — 74177 CT ABD & PELVIS W/CONTRAST: CPT

## 2023-07-01 PROCEDURE — 80053 COMPREHEN METABOLIC PANEL: CPT | Performed by: EMERGENCY MEDICINE

## 2023-07-01 PROCEDURE — 83690 ASSAY OF LIPASE: CPT | Performed by: EMERGENCY MEDICINE

## 2023-07-01 PROCEDURE — 96375 TX/PRO/DX INJ NEW DRUG ADDON: CPT

## 2023-07-01 PROCEDURE — 96361 HYDRATE IV INFUSION ADD-ON: CPT

## 2023-07-01 PROCEDURE — 36415 COLL VENOUS BLD VENIPUNCTURE: CPT | Performed by: EMERGENCY MEDICINE

## 2023-07-01 RX ORDER — ONDANSETRON 2 MG/ML
4 INJECTION INTRAMUSCULAR; INTRAVENOUS ONCE
Status: COMPLETED | OUTPATIENT
Start: 2023-07-01 | End: 2023-07-01

## 2023-07-01 RX ORDER — ONDANSETRON 4 MG/1
4 TABLET, ORALLY DISINTEGRATING ORAL EVERY 6 HOURS PRN
Qty: 10 TABLET | Refills: 0 | Status: SHIPPED | OUTPATIENT
Start: 2023-07-01

## 2023-07-01 RX ORDER — DICYCLOMINE HCL 20 MG
20 TABLET ORAL ONCE
Status: COMPLETED | OUTPATIENT
Start: 2023-07-01 | End: 2023-07-01

## 2023-07-01 RX ORDER — KETOROLAC TROMETHAMINE 30 MG/ML
15 INJECTION, SOLUTION INTRAMUSCULAR; INTRAVENOUS ONCE
Status: COMPLETED | OUTPATIENT
Start: 2023-07-01 | End: 2023-07-01

## 2023-07-01 RX ORDER — DICYCLOMINE HCL 20 MG
20 TABLET ORAL EVERY 6 HOURS PRN
Qty: 15 TABLET | Refills: 0 | Status: SHIPPED | OUTPATIENT
Start: 2023-07-01

## 2023-07-01 RX ADMIN — IOHEXOL 100 ML: 350 INJECTION, SOLUTION INTRAVENOUS at 15:13

## 2023-07-01 RX ADMIN — KETOROLAC TROMETHAMINE 15 MG: 30 INJECTION, SOLUTION INTRAMUSCULAR; INTRAVENOUS at 14:33

## 2023-07-01 RX ADMIN — SODIUM CHLORIDE 1000 ML: 0.9 INJECTION, SOLUTION INTRAVENOUS at 14:32

## 2023-07-01 RX ADMIN — ONDANSETRON 4 MG: 2 INJECTION INTRAMUSCULAR; INTRAVENOUS at 14:33

## 2023-07-01 RX ADMIN — DICYCLOMINE HYDROCHLORIDE 20 MG: 20 TABLET ORAL at 17:19

## 2023-07-01 NOTE — ED PROVIDER NOTES
History  Chief Complaint   Patient presents with   • Abdominal Pain     Pt reports LLQ abd pain, back pain starting yesterday, n/v/d starting this morning. A 70-year-old female with past medical history of PE (on eliquis), hypothyroidism and RA; presents with left lower quadrant abdominal pain that began yesterday and significantly worsened today. Pain is associated with nausea, vomiting and diarrhea that started today. Patient also complains of increasing low back pain today. Patient has otherwise not had fever, chills, chest pain, shortness of breath, dysuria, urinary frequency/urgency, peripheral edema and rashes. Patient reports a history of pelvic congestion syndrome, however denies history of similar pain. She also started her menses a few days ago. Assessment and plan: Left lower quadrant abdominal pain, reproducible on exam.  Concern for possible diverticulitis vs ovarian pathology. Will check lab work and imaging for further evaluation. Will treat symptomatically      History provided by:  Patient and medical records  Abdominal Pain  Associated symptoms: diarrhea, nausea and vomiting        Prior to Admission Medications   Prescriptions Last Dose Informant Patient Reported? Taking?    Ascorbic Acid (vitamin C) 100 MG tablet 2023  Yes Yes   Sig: Take 100 mg by mouth daily   Eliquis 5 MG 2023  Yes Yes   Ergocalciferol (VITAMIN D2 PO) Past Week  Yes Yes   Si,000 Units   FeroSul 325 (65 Fe) MG tablet Not Taking  Yes No   Sig: Take 1 tablet by mouth daily with breakfast   Patient not taking: Reported on 2023   SUMAtriptan (IMITREX) 50 mg tablet Not Taking  Yes No   Sig: Take 50 mg by mouth   Patient not taking: Reported on 2022   dicyclomine (BENTYL) 20 mg tablet   No Yes   Sig: Take 1 tablet (20 mg total) by mouth 2 (two) times a day as needed (abd pain) for up to 2 days   levothyroxine 100 mcg tablet 2023  Yes Yes   Sig: Take 100 mcg by mouth daily 100 mcg Monday through Friday 88 Saturday and    lubiprostone (AMITIZA) 24 mcg capsule Past Week  Yes Yes   Sig: TAKE 1 CAPSULE (24 MCG TOTAL) BY MOUTH 2 (TWO) TIMES A DAY WITH MEALS   ondansetron (Zofran ODT) 4 mg disintegrating tablet   No No   Sig: Take 1 tablet (4 mg total) by mouth every 8 (eight) hours as needed for nausea or vomiting for up to 2 days   predniSONE 10 mg tablet Not Taking  Yes No   Sig: PLEASE TAKE 1 TABLET BY MOUTH DAILY UNTIL YOUR NEXT VISIT   Patient not taking: No sig reported   spironolactone (ALDACTONE) 50 mg tablet 2023  Yes Yes   Sig: spironolactone 50 mg tablet   vitamin A 3 MG (38614 UT) capsule Past Week  Yes Yes   Sig: Take 10,000 Units by mouth daily   vitamin E, tocopherol, 400 units capsule Past Week  Yes Yes   Sig: Take 400 Units by mouth daily      Facility-Administered Medications: None       Past Medical History:   Diagnosis Date   • Disease of thyroid gland    • Scoliosis        Past Surgical History:   Procedure Laterality Date   •  SECTION     • CHOLECYSTECTOMY         Family History   Problem Relation Age of Onset   • Diabetes Mother      I have reviewed and agree with the history as documented. E-Cigarette/Vaping   • E-Cigarette Use Never User      E-Cigarette/Vaping Substances     Social History     Tobacco Use   • Smoking status: Never   • Smokeless tobacco: Never   Vaping Use   • Vaping Use: Never used   Substance Use Topics   • Alcohol use: No   • Drug use: No       Review of Systems   Gastrointestinal: Positive for abdominal pain, diarrhea, nausea and vomiting. All other systems reviewed and are negative. Physical Exam  Physical Exam  General Appearance: alert and oriented, appears uncomfortable  Skin:  Warm, dry, intact. No cyanosis  HEENT: Atraumatic, normocephalic. No eye drainage. Normal hearing. Moist mucous membranes. Neck: Supple, trachea midline  Cardiac: RRR; no murmurs, rub, gallops.   No pedal edema, 2+ pulses  Pulmonary: lungs CTAB; no wheezes, rales, rhonchi  Gastrointestinal: abdomen soft, LLQ tenderness, nondistended; no guarding or rebound tenderness; good bowel sounds, no mass or bruits  Extremities:  No deformities.   No calf tenderness, no clubbing  Neuro:  no focal motor or sensory deficits, CN 2-12 grossly intact  Psych:  Normal mood and affect, normal judgement and insight      Vital Signs  ED Triage Vitals [07/01/23 1401]   Temperature Pulse Respirations Blood Pressure SpO2   98.4 °F (36.9 °C) 93 20 136/65 97 %      Temp Source Heart Rate Source Patient Position - Orthostatic VS BP Location FiO2 (%)   Oral Monitor Lying Right arm --      Pain Score       7           Vitals:    07/01/23 1401 07/01/23 1515 07/01/23 1615 07/01/23 1719   BP: 136/65 118/59 110/62 102/52   Pulse: 93 84 71 67   Patient Position - Orthostatic VS: Lying Lying Sitting Sitting         Visual Acuity      ED Medications  Medications   sodium chloride 0.9 % bolus 1,000 mL (0 mL Intravenous Stopped 7/1/23 1719)   ondansetron (ZOFRAN) injection 4 mg (4 mg Intravenous Given 7/1/23 1433)   ketorolac (TORADOL) injection 15 mg (15 mg Intravenous Given 7/1/23 1433)   iohexol (OMNIPAQUE) 350 MG/ML injection (SINGLE-DOSE) 100 mL (100 mL Intravenous Given 7/1/23 1513)   dicyclomine (BENTYL) tablet 20 mg (20 mg Oral Given 7/1/23 1719)       Diagnostic Studies  Results Reviewed     Procedure Component Value Units Date/Time    Urine Microscopic [[id number]]  (Abnormal) Collected: 07/01/23 1440    Lab Status: Final result Specimen: Urine, Clean Catch Updated: 07/01/23 1502     RBC, UA 1-2 /hpf      WBC, UA 2-4 /hpf      Epithelial Cells Occasional /hpf      Bacteria, UA Occasional /hpf     Lipase [[id number]]  (Normal) Collected: 07/01/23 1431    Lab Status: Final result Specimen: Blood from Arm, Left Updated: 07/01/23 1502     Lipase 22 u/L     Comprehensive metabolic panel [[id number]] Collected: 07/01/23 1431    Lab Status: Final result Specimen: Blood from Arm, Left Updated: 07/01/23 1502     Sodium 136 mmol/L      Potassium 3.8 mmol/L      Chloride 104 mmol/L      CO2 24 mmol/L      ANION GAP 8 mmol/L      BUN 8 mg/dL      Creatinine 0.63 mg/dL      Glucose 104 mg/dL      Calcium 8.9 mg/dL      AST 13 U/L      ALT 11 U/L      Alkaline Phosphatase 59 U/L      Total Protein 7.6 g/dL      Albumin 4.1 g/dL      Total Bilirubin 0.26 mg/dL      eGFR 107 ml/min/1.73sq m     Narrative:      National Kid[employer] Disease Foundation guidelines for Chronic Kidney Disease (CKD):   •  Stage 1 with normal or high GFR (GFR > 90 mL/min/1.73 square meters)  •  Stage 2 Mild CKD (GFR = 60-89 mL/min/1.73 square meters)  •  Stage 3A Moderate CKD (GFR = 45-59 mL/min/1.73 square meters)  •  Stage 3B Moderate CKD (GFR = 30-44 mL/min/1.73 square meters)  •  Stage 4 Severe CKD (GFR = 15-29 mL/min/1.73 square meters)  •  Stage 5 End Stage CKD (GFR <15 mL/min/1.73 square meters)  Note: GFR calculation is accurate only with a steady state creatinine    CBC and differential [[id number]]  (Abnormal) Collected: 07/01/23 1431    Lab Status: Final result Specimen: Blood from Arm, Left Updated: 07/01/23 1443     WBC 7.87 Thousand/uL      RBC 4.08 Million/uL      Hemoglobin 10.7 g/dL      Hematocrit 36.2 %      MCV 89 fL      MCH 26.2 pg      MCHC 29.6 g/dL      RDW 14.1 %      MPV 9.9 fL      Platelets 283 Thousands/uL      nRBC 0 /100 WBCs      Neutrophils Relative 76 %      Immat GRANS % 1 %      Lymphocytes Relative 16 %      Monocytes Relative 6 %      Eosinophils Relative 0 %      Basophils Relative 1 %      Neutrophils Absolute 6.02 Thousands/µL      Immature Grans Absolute 0.04 Thousand/uL      Lymphocytes Absolute 1.28 Thousands/µL      Monocytes Absolute 0.46 Thousand/µL      Eosinophils Absolute 0.03 Thousand/µL      Basophils Absolute 0.04 Thousands/µL     Urine Macroscopic, POC [[id number]]  (Abnormal) Collected: 07/01/23 1440    Lab Status: Final result Specimen: Urine Updated: 07/01/23 1441     Color, UA Yellow Clarity, UA Clear     pH, UA 6.0     Leukocytes, UA Small     Nitrite, UA Negative     Protein, UA Negative mg/dl      Glucose, UA Negative mg/dl      Ketones, UA Negative mg/dl      Urobilinogen, UA 0.2 E.U./dl      Bilirubin, UA Negative     Occult Blood, UA Large     Specific Gravity, UA <=1.005    Narrative:      CLINITEK RESULT                 CT abdomen pelvis with contrast   Final Result by Lisbeth Daniel MD (07/01 1658)      Long segment colonic wall thickening from the level of the transverse to the sigmoid colon suggestive of colitis. No evidence for bowel obstruction. Status post cholecystectomy with stable biliary ductal dilatation. Stable 5 mm right lower lobe thyroid nodule since 9/14/2021. Workstation performed: [id number]                    Procedures  Procedures         ED Course  ED Course as of 07/01/23 1725   Sat Jul 01, 2023   1504 Labs within normal limits. UA negative for acute infection   1706 CT abdomen pelvis with contrast  1.) Long segment colonic wall thickening from the level of the transverse to the sigmoid colon suggestive of colitis. No evidence for bowel obstruction. 2.) Status post cholecystectomy with stable biliary ductal dilatation. 3.) Stable 5 mm right lower lobe thyroid nodule since 9/14/2021.   1706 Imaging consistent with acute sigmoid colitis. Work up reassuring without leukocytosis, pt afebrile. Likely viral etiology. Pt updated on results. Will recommend continued symptomatic treatment, pt in agreement. SBIRT 20yo+    Flowsheet Row Most Recent Value   Initial Alcohol Screen: US AUDIT-C     1. How often do you have a drink containing alcohol? 0 Filed at: 07/01/2023 1429   2. How many drinks containing alcohol do you have on a typical day you are drinking? 0 Filed at: 07/01/2023 1429   3a. Male UNDER 65: How often do you have five or more drinks on one occasion? 0 Filed at: 07/01/2023 1429   3b.  FEMALE Any Age, or MALE 65+: How often do you have 4 or more drinks on one occassion? 0 Filed at: 07/01/2023 1429   Audit-C Score 0 Filed at: 07/01/2023 1429   LYNNE: How many times in the past year have you. .. Used an illegal drug or used a prescription medication for non-medical reasons? Never Filed at: 07/01/2023 1429                    Medical Decision Making  Amount and/or Complexity of Data Reviewed  Labs: ordered. Radiology: ordered. Decision-making details documented in ED Course. Risk  Prescription drug management. Disposition  Final diagnoses:   Colitis   Vomiting and diarrhea     Time reflects when diagnosis was documented in both MDM as applicable and the Disposition within this note     Time User Action Codes Description Comment    7/1/2023  5:08 PM Ronen Cordova Add [K52.9] Colitis     7/1/2023  5:08 PM Scotty Donald Add [R11.10,  R19.7] Vomiting and diarrhea       ED Disposition     ED Disposition   Discharge    Condition   Stable    Date/Time   Sat Jul 1, 2023  5:07 PM    Comment   Amy Cooley discharge to home/self care.                Follow-up Information     Follow up With Specialties Details Why Contact Info Additional Information    Kemar Rios MD Family Medicine Schedule an appointment as soon as possible for a visit in 3 days [facility]uation [facility]  [address]  [address] [facility] [address]  [address] Emergency Department Emergency Medicine Go to  If symptoms worsen [address] [id number]  [address] [facility] Emergency Department, [address] [place] [address], [id number]          Patient's Medications   Discharge Prescriptions    DICYCLOMINE (BENTYL) 20 MG TABLET    Take 1 tablet (20 mg total) by mouth every 6 (six) hours as needed (diarrhea, abdominal cramps)       Start Date: 7/1/2023  End Date: --       Order Dose: 20 mg       Quantity: 15 tablet    Refills: 0    ONDANSETRON (ZOFRAN-ODT) 4 MG DISINTEGRATING TABLET    Take 1 tablet (4 mg total) by mouth every 6 (six) hours as needed for nausea       Start Date: 7/1/2023  End Date: --       Order Dose: 4 mg       Quantity: 10 tablet    Refills: 0       No discharge procedures on file.     PDMP Review     None          ED Provider  Electronically Signed by           Talya Good DO  07/01/23 1520

## 2023-11-27 ENCOUNTER — OFFICE VISIT (OUTPATIENT)
Dept: DENTISTRY | Facility: CLINIC | Age: 48
End: 2023-11-27

## 2023-11-27 VITALS — DIASTOLIC BLOOD PRESSURE: 81 MMHG | HEART RATE: 102 BPM | SYSTOLIC BLOOD PRESSURE: 136 MMHG

## 2023-11-27 DIAGNOSIS — Z01.20 ENCOUNTER FOR DENTAL EXAMINATION: Primary | ICD-10-CM

## 2023-11-27 PROCEDURE — D0120 PERIODIC ORAL EVALUATION - ESTABLISHED PATIENT: HCPCS

## 2023-11-27 PROCEDURE — D0274 BITEWINGS - 4 RADIOGRAPHIC IMAGES: HCPCS

## 2023-11-27 NOTE — DENTAL PROCEDURE DETAILS
Juice Vazquez presents for a Periodic exam. Verbal consent for treatment given in addition to the forms. Reviewed health history - Patient is ASA II  Consents signed: Yes     Perio: Localized #2, 3, 14, 15, 24, 25, 31  Pain Scale: 0  Caries Assessment: Low  Radiographs: Bitewings x4    Periodic Exam    ASA  II  Pain: None  Reviewed M/DH     Exams:  Periodic exam and perio charted  Xrays:     4BWX  Type of Treatment:     Reviewed O[facility]  Brush:  2X/day and Floss 1X/day  EO/OCS Exams:  No significant findings  IO: No significant findings  Oral Hygiene:  Good   Plaque:  Light    Calculus: Moderate    Bleeding:  Light    Gingiva:  Firm  / Red    Stain:  Light    Perio Charting:  Periocharting was completed and evaluated. 1 - 7  mm w/       BUP  Perio Findings:  Localized SRP recommended for some deeper PM in post, md weeks  Caries Findings:  None  Caries Risk Assessment:    Low   caries risk    Treatment Plan:  Updated   Exam:  Dr. Galdino Clarke  Referral:  No referral given     Pt was denied SRP by insurance. Re probed today and explained need for treatment. We will resubmit the preauth and explained SFS to pt. Pt understands need for treatment.     NV:  Selective SRP UR  NV2: Selective SRP MARK Truj[place], [facility]

## 2023-12-02 ENCOUNTER — HOSPITAL ENCOUNTER (EMERGENCY)
Facility: HOSPITAL | Age: 48
Discharge: HOME | End: 2023-12-02
Attending: EMERGENCY MEDICINE
Payer: COMMERCIAL

## 2023-12-02 ENCOUNTER — APPOINTMENT (EMERGENCY)
Dept: RADIOLOGY | Facility: HOSPITAL | Age: 48
End: 2023-12-02
Payer: COMMERCIAL

## 2023-12-02 VITALS
HEART RATE: 80 BPM | SYSTOLIC BLOOD PRESSURE: 116 MMHG | TEMPERATURE: 98.4 F | WEIGHT: 227.96 LBS | OXYGEN SATURATION: 99 % | DIASTOLIC BLOOD PRESSURE: 60 MMHG | BODY MASS INDEX: 35.7 KG/M2 | RESPIRATION RATE: 18 BRPM

## 2023-12-02 DIAGNOSIS — R55 SYNCOPE: Primary | ICD-10-CM

## 2023-12-02 LAB
ALBUMIN SERPL BCP-MCNC: 4.2 G/DL (ref 3.5–5)
ALP SERPL-CCNC: 70 U/L (ref 34–104)
ALT SERPL W P-5'-P-CCNC: 18 U/L (ref 7–52)
ANION GAP SERPL CALCULATED.3IONS-SCNC: 7 MMOL/L
AST SERPL W P-5'-P-CCNC: 17 U/L (ref 13–39)
BACTERIA UR QL AUTO: NORMAL /HPF
BASOPHILS # BLD AUTO: 0.06 THOUSANDS/ÂΜL (ref 0–0.1)
BASOPHILS NFR BLD AUTO: 1 % (ref 0–1)
BILIRUB DIRECT SERPL-MCNC: 0 MG/DL (ref 0–0.2)
BILIRUB SERPL-MCNC: 0.22 MG/DL (ref 0.2–1)
BILIRUB UR QL STRIP: NEGATIVE
BUN SERPL-MCNC: 10 MG/DL (ref 5–25)
CALCIUM SERPL-MCNC: 9.5 MG/DL (ref 8.4–10.2)
CARDIAC TROPONIN I PNL SERPL HS: <2 NG/L
CARDIAC TROPONIN I PNL SERPL HS: <2 NG/L
CHLORIDE SERPL-SCNC: 103 MMOL/L (ref 96–108)
CLARITY UR: CLEAR
CO2 SERPL-SCNC: 26 MMOL/L (ref 21–32)
COLOR UR: YELLOW
CREAT SERPL-MCNC: 0.76 MG/DL (ref 0.6–1.3)
EOSINOPHIL # BLD AUTO: 0.08 THOUSAND/ÂΜL (ref 0–0.61)
EOSINOPHIL NFR BLD AUTO: 1 % (ref 0–6)
ERYTHROCYTE [DISTWIDTH] IN BLOOD BY AUTOMATED COUNT: 14.9 % (ref 11.6–15.1)
GFR SERPL CREATININE-BSD FRML MDRD: 93 ML/MIN/1.73SQ M
GLUCOSE SERPL-MCNC: 91 MG/DL (ref 65–140)
GLUCOSE UR STRIP-MCNC: NEGATIVE MG/DL
HCT VFR BLD AUTO: 36.4 % (ref 34.8–46.1)
HGB BLD-MCNC: 11 G/DL (ref 11.5–15.4)
HGB UR QL STRIP.AUTO: ABNORMAL
IMM GRANULOCYTES # BLD AUTO: 0.04 THOUSAND/UL (ref 0–0.2)
IMM GRANULOCYTES NFR BLD AUTO: 1 % (ref 0–2)
KETONES UR STRIP-MCNC: NEGATIVE MG/DL
LEUKOCYTE ESTERASE UR QL STRIP: NEGATIVE
LYMPHOCYTES # BLD AUTO: 1.56 THOUSANDS/ÂΜL (ref 0.6–4.47)
LYMPHOCYTES NFR BLD AUTO: 19 % (ref 14–44)
MAGNESIUM SERPL-MCNC: 1.9 MG/DL (ref 1.9–2.7)
MCH RBC QN AUTO: 25.3 PG (ref 26.8–34.3)
MCHC RBC AUTO-ENTMCNC: 30.2 G/DL (ref 31.4–37.4)
MCV RBC AUTO: 84 FL (ref 82–98)
MONOCYTES # BLD AUTO: 0.67 THOUSAND/ÂΜL (ref 0.17–1.22)
MONOCYTES NFR BLD AUTO: 8 % (ref 4–12)
NEUTROPHILS # BLD AUTO: 5.93 THOUSANDS/ÂΜL (ref 1.85–7.62)
NEUTS SEG NFR BLD AUTO: 70 % (ref 43–75)
NITRITE UR QL STRIP: NEGATIVE
NON-SQ EPI CELLS URNS QL MICRO: NORMAL /HPF
NRBC BLD AUTO-RTO: 0 /100 WBCS
PH UR STRIP.AUTO: 7 [PH] (ref 4.5–8)
PLATELET # BLD AUTO: 410 THOUSANDS/UL (ref 149–390)
PMV BLD AUTO: 10.4 FL (ref 8.9–12.7)
POTASSIUM SERPL-SCNC: 4.2 MMOL/L (ref 3.5–5.3)
PROT SERPL-MCNC: 7.9 G/DL (ref 6.4–8.4)
PROT UR STRIP-MCNC: NEGATIVE MG/DL
RBC # BLD AUTO: 4.34 MILLION/UL (ref 3.81–5.12)
RBC #/AREA URNS AUTO: NORMAL /HPF
SODIUM SERPL-SCNC: 136 MMOL/L (ref 135–147)
SP GR UR STRIP.AUTO: <=1.005 (ref 1–1.03)
T4 FREE SERPL-MCNC: 0.53 NG/DL (ref 0.61–1.12)
TSH SERPL DL<=0.05 MIU/L-ACNC: 7.41 UIU/ML (ref 0.45–4.5)
UROBILINOGEN UR QL STRIP.AUTO: 0.2 E.U./DL
WBC # BLD AUTO: 8.34 THOUSAND/UL (ref 4.31–10.16)
WBC #/AREA URNS AUTO: NORMAL /HPF

## 2023-12-02 PROCEDURE — 36415 COLL VENOUS BLD VENIPUNCTURE: CPT | Performed by: EMERGENCY MEDICINE

## 2023-12-02 PROCEDURE — 81001 URINALYSIS AUTO W/SCOPE: CPT

## 2023-12-02 PROCEDURE — 84439 ASSAY OF FREE THYROXINE: CPT | Performed by: EMERGENCY MEDICINE

## 2023-12-02 PROCEDURE — 84443 ASSAY THYROID STIM HORMONE: CPT | Performed by: EMERGENCY MEDICINE

## 2023-12-02 PROCEDURE — 80048 BASIC METABOLIC PNL TOTAL CA: CPT | Performed by: EMERGENCY MEDICINE

## 2023-12-02 PROCEDURE — 80076 HEPATIC FUNCTION PANEL: CPT | Performed by: EMERGENCY MEDICINE

## 2023-12-02 PROCEDURE — 96360 HYDRATION IV INFUSION INIT: CPT

## 2023-12-02 PROCEDURE — 71046 X-RAY EXAM CHEST 2 VIEWS: CPT

## 2023-12-02 PROCEDURE — 84484 ASSAY OF TROPONIN QUANT: CPT | Performed by: EMERGENCY MEDICINE

## 2023-12-02 PROCEDURE — 99285 EMERGENCY DEPT VISIT HI MDM: CPT | Performed by: EMERGENCY MEDICINE

## 2023-12-02 PROCEDURE — 85025 COMPLETE CBC W/AUTO DIFF WBC: CPT | Performed by: EMERGENCY MEDICINE

## 2023-12-02 PROCEDURE — 96361 HYDRATE IV INFUSION ADD-ON: CPT

## 2023-12-02 PROCEDURE — 93005 ELECTROCARDIOGRAM TRACING: CPT

## 2023-12-02 PROCEDURE — 99284 EMERGENCY DEPT VISIT MOD MDM: CPT

## 2023-12-02 PROCEDURE — 83735 ASSAY OF MAGNESIUM: CPT | Performed by: EMERGENCY MEDICINE

## 2023-12-02 RX ORDER — ROSUVASTATIN CALCIUM 40 MG/1
40 TABLET, COATED ORAL DAILY
COMMUNITY
Start: 2023-07-13

## 2023-12-02 RX ORDER — MIRABEGRON 50 MG/1
50 TABLET, FILM COATED, EXTENDED RELEASE ORAL DAILY
COMMUNITY
Start: 2023-11-16 | End: 2024-11-15

## 2023-12-02 RX ORDER — BUSPIRONE HYDROCHLORIDE 15 MG/1
15 TABLET ORAL 2 TIMES DAILY
COMMUNITY
Start: 2023-10-17

## 2023-12-02 RX ORDER — METHOTREXATE SODIUM 25 MG/ML
25 INJECTION, SOLUTION INTRA-ARTERIAL; INTRAMUSCULAR; INTRAVENOUS
COMMUNITY
Start: 2023-09-08 | End: 2024-09-07

## 2023-12-02 RX ORDER — METFORMIN HYDROCHLORIDE 750 MG/1
750 TABLET, EXTENDED RELEASE ORAL
COMMUNITY
Start: 2023-10-17 | End: 2024-10-16

## 2023-12-02 RX ORDER — ALBUTEROL SULFATE 2.5 MG/3ML
2.5 SOLUTION RESPIRATORY (INHALATION) EVERY 4 HOURS PRN
COMMUNITY
Start: 2023-05-22 | End: 2024-05-21

## 2023-12-02 RX ORDER — BUPROPION HYDROCHLORIDE 150 MG/1
TABLET, EXTENDED RELEASE ORAL
COMMUNITY
Start: 2023-10-05

## 2023-12-02 RX ORDER — ABATACEPT 125 MG/ML
125 INJECTION, SOLUTION SUBCUTANEOUS
COMMUNITY
Start: 2023-11-20 | End: 2024-05-18

## 2023-12-02 RX ORDER — MECLIZINE HCL 12.5 MG/1
TABLET ORAL
COMMUNITY

## 2023-12-02 RX ORDER — PANTOPRAZOLE SODIUM 40 MG/1
40 TABLET, DELAYED RELEASE ORAL 2 TIMES DAILY
COMMUNITY
Start: 2023-04-18 | End: 2024-04-17

## 2023-12-02 RX ORDER — DULOXETIN HYDROCHLORIDE 60 MG/1
60 CAPSULE, DELAYED RELEASE ORAL DAILY
COMMUNITY
Start: 2023-04-03 | End: 2024-04-02

## 2023-12-02 RX ADMIN — SODIUM CHLORIDE 1000 ML: 0.9 INJECTION, SOLUTION INTRAVENOUS at 16:31

## 2023-12-02 NOTE — DISCHARGE INSTRUCTIONS
Return to the ER if your chest pain changes in quality or location, or becomes associated with shortness of breath, lightheadedness, vomiting or sweating. Make sure to drink plenty of fluids. Call your family doctor, you should be seen in the office for further evaluation.

## 2023-12-02 NOTE — ED PROVIDER NOTES
History  Chief Complaint   Patient presents with    Syncope     Pt reports was watching a movie and when she stood up she fainted. Reports home O2 96%      51 YO female presents after a syncopal episode. Patient states she had been sitting, watching a movie with family. She stood and had a sudden episode of lightheadedness, accompanied by a feeling of squeezing chest pain. She had a brief loss of consciousness, states her family informed her that her heart rate was significantly elevated. Patient states she has had similar episodes twice previously, last time was within the last month. At time of physician evaluation she denies any palpitations or chest discomfort, stating she only feels tired. She does follow up with cardiology for tachycardia and does take metoprolol. Patient takes Eliquis due to provided PE while she had COVID. Pt denies CP/SOB/F/C/N/V/D/C, no dysuria, burning on urination or blood in urine. History provided by:  Patient   used: No        Prior to Admission Medications   Prescriptions Last Dose Informant Patient Reported? Taking?    Abatacept (Orencia ClickJect) 419 MG/ML SOAJ   Yes Yes   Sig: Inject 125 mg under the skin   Ascorbic Acid (vitamin C) 100 MG tablet   Yes No   Sig: Take 100 mg by mouth daily   DULoxetine (CYMBALTA) 60 mg delayed release capsule   Yes Yes   Sig: Take 60 mg by mouth daily   Eliquis 5 MG   Yes No   Ergocalciferol (VITAMIN D2 PO)   Yes No   Si,000 Units   FeroSul 325 (65 Fe) MG tablet Not Taking  Yes No   Sig: Take 1 tablet by mouth daily with breakfast   Patient not taking: Reported on 2023   Methotrexate Sodium (methotrexate, PF,) 250 MG/10ML injection   Yes Yes   Sig: Inject 25 mg under the skin   Mirabegron ER (Myrbetriq) 50 MG TB24   Yes Yes   Sig: Take 50 mg by mouth daily   SUMAtriptan (IMITREX) 50 mg tablet   Yes No   Sig: Take 50 mg by mouth   Patient not taking: Reported on 2022   albuterol (2.5 mg/3 mL) 0.083 % nebulizer solution   Yes Yes   Sig: Inhale 2.5 mg every 4 (four) hours as needed   buPROPion (WELLBUTRIN SR) 150 mg 12 hr tablet   Yes Yes   busPIRone (BUSPAR) 15 mg tablet   Yes Yes   Sig: Take 15 mg by mouth 2 (two) times a day   dicyclomine (BENTYL) 20 mg tablet   No No   Sig: Take 1 tablet (20 mg total) by mouth 2 (two) times a day as needed (abd pain) for up to 2 days   dicyclomine (BENTYL) 20 mg tablet   No No   Sig: Take 1 tablet (20 mg total) by mouth every 6 (six) hours as needed (diarrhea, abdominal cramps)   levothyroxine 100 mcg tablet   Yes No   Sig: Take 100 mcg by mouth daily 100 mcg Monday through Friday 88 Saturday and Sunday   lubiprostone (AMITIZA) 24 mcg capsule   Yes No   Sig: TAKE 1 CAPSULE (24 MCG TOTAL) BY MOUTH 2 (TWO) TIMES A DAY WITH MEALS   meclizine (ANTIVERT) 12.5 MG tablet   Yes No   metFORMIN (GLUCOPHAGE-XR) 750 mg 24 hr tablet   Yes Yes   Sig: Take 750 mg by mouth   metoprolol tartrate (LOPRESSOR) 25 mg tablet   Yes Yes   Sig: Take 25 mg by mouth 2 (two) times a day   ondansetron (ZOFRAN-ODT) 4 mg disintegrating tablet   No No   Sig: Take 1 tablet (4 mg total) by mouth every 6 (six) hours as needed for nausea   ondansetron (Zofran ODT) 4 mg disintegrating tablet   No No   Sig: Take 1 tablet (4 mg total) by mouth every 8 (eight) hours as needed for nausea or vomiting for up to 2 days   pantoprazole (PROTONIX) 40 mg tablet   Yes Yes   Sig: Take 40 mg by mouth 2 (two) times a day   predniSONE 10 mg tablet Not Taking  Yes No   Sig: PLEASE TAKE 1 TABLET BY MOUTH DAILY UNTIL YOUR NEXT VISIT   Patient not taking: No sig reported   rosuvastatin (CRESTOR) 40 MG tablet   Yes Yes   Sig: Take 40 mg by mouth daily   spironolactone (ALDACTONE) 50 mg tablet   Yes No   Sig: spironolactone 50 mg tablet   vitamin A 3 MG (10982 UT) capsule   Yes No   Sig: Take 10,000 Units by mouth daily   vitamin E, tocopherol, 400 units capsule   Yes No   Sig: Take 400 Units by mouth daily      Facility-Administered Medications: None       Past Medical History:   Diagnosis Date    Disease of thyroid gland     Scoliosis        Past Surgical History:   Procedure Laterality Date     SECTION      CHOLECYSTECTOMY         Family History   Problem Relation Age of Onset    Diabetes Mother      I have reviewed and agree with the history as documented. E-Cigarette/Vaping    E-Cigarette Use Never User      E-Cigarette/Vaping Substances     Social History     Tobacco Use    Smoking status: Never    Smokeless tobacco: Never   Vaping Use    Vaping Use: Never used   Substance Use Topics    Alcohol use: No    Drug use: No       Review of Systems   Constitutional:  Negative for chills, fatigue and fever. HENT:  Negative for dental problem. Eyes:  Negative for visual disturbance. Respiratory:  Negative for shortness of breath. Cardiovascular:  Positive for chest pain. Gastrointestinal:  Negative for abdominal pain, diarrhea and vomiting. Genitourinary:  Negative for dysuria and frequency. Musculoskeletal:  Negative for arthralgias. Skin:  Negative for rash. Neurological:  Positive for syncope. Negative for dizziness, weakness and light-headedness. Psychiatric/Behavioral:  Negative for agitation, behavioral problems and confusion. All other systems reviewed and are negative. Physical Exam  Physical Exam  Vitals and nursing note reviewed. Constitutional:       Appearance: Normal appearance. HENT:      Head: Normocephalic and atraumatic. Eyes:      Extraocular Movements: Extraocular movements intact. Conjunctiva/sclera: Conjunctivae normal.   Cardiovascular:      Rate and Rhythm: Normal rate and regular rhythm. Pulses: Normal pulses. Heart sounds: Normal heart sounds. Pulmonary:      Effort: Pulmonary effort is normal.      Breath sounds: Normal breath sounds. Abdominal:      General: There is no distension. Musculoskeletal:         General: Normal range of motion.       Cervical back: Normal range of motion. Skin:     Findings: No rash. Neurological:      General: No focal deficit present. Mental Status: She is alert. Cranial Nerves: No cranial nerve deficit.    Psychiatric:         Mood and Affect: Mood normal.         Vital Signs  ED Triage Vitals [12/02/23 1550]   Temperature Pulse Respirations Blood Pressure SpO2   98.4 °F (36.9 °C) 84 18 122/72 99 %      Temp Source Heart Rate Source Patient Position - Orthostatic VS BP Location FiO2 (%)   Oral Monitor -- -- --      Pain Score       --           Vitals:    12/02/23 1550   BP: 122/72   Pulse: 84         Visual Acuity      ED Medications  Medications   sodium chloride 0.9 % bolus 1,000 mL (1,000 mL Intravenous New Bag 12/2/23 1631)       Diagnostic Studies  Results Reviewed       Procedure Component Value Units Date/Time    Basic metabolic panel [[id number]] Collected: 12/02/23 1623    Lab Status: Final result Specimen: Blood from Arm, Left Updated: 12/02/23 1655     Sodium 136 mmol/L      Potassium 4.2 mmol/L      Chloride 103 mmol/L      CO2 26 mmol/L      ANION GAP 7 mmol/L      BUN 10 mg/dL      Creatinine 0.76 mg/dL      Glucose 91 mg/dL      Calcium 9.5 mg/dL      eGFR 93 ml/min/1.73sq m     Narrative:      [place] guidelines for Chronic Kidney Disease (CKD):     Stage 1 with normal or high GFR (GFR > 90 mL/min/1.73 square meters)    Stage 2 Mild CKD (GFR = 60-89 mL/min/1.73 square meters)    Stage 3A Moderate CKD (GFR = 45-59 mL/min/1.73 square meters)    Stage 3B Moderate CKD (GFR = 30-44 mL/min/1.73 square meters)    Stage 4 Severe CKD (GFR = 15-29 mL/min/1.73 square meters)    Stage 5 End Stage CKD (GFR <15 mL/min/1.73 square meters)  Note: GFR calculation is accurate only with a steady state creatinine    Hepatic function panel [[id number]]  (Normal) Collected: 12/02/23 1623    Lab Status: Final result Specimen: Blood from Arm, Left Updated: 12/02/23 1655     Total Bilirubin 0.22 mg/dL Bilirubin, Direct 0.00 mg/dL      Alkaline Phosphatase 70 U/L      AST 17 U/L      ALT 18 U/L      Total Protein 7.9 g/dL      Albumin 4.2 g/dL     Magnesium [[id number]]  (Normal) Collected: 12/02/23 1623    Lab Status: Final result Specimen: Blood from Arm, Left Updated: 12/02/23 1655     Magnesium 1.9 mg/dL     Urine Microscopic [[id number]] Collected: 12/02/23 1648    Lab Status: In process Specimen: Urine, Clean Catch Updated: 12/02/23 1654    Urine Macroscopic, POC [[id number]]  (Abnormal) Collected: 12/02/23 1648    Lab Status: Final result Specimen: Urine Updated: 12/02/23 1649     Color, UA Yellow     Clarity, UA Clear     pH, UA 7.0     Leukocytes, UA Negative     Nitrite, UA Negative     Protein, UA Negative mg/dl      Glucose, UA Negative mg/dl      Ketones, UA Negative mg/dl      Urobilinogen, UA 0.2 E.U./dl      Bilirubin, UA Negative     Occult Blood, UA Moderate     Specific Gravity, UA <=1.005    Narrative:      CLINITEK RESULT    CBC and differential [[id number]]  (Abnormal) Collected: 12/02/23 1623    Lab Status: Final result Specimen: Blood from Arm, Left Updated: 12/02/23 1636     WBC 8.34 Thousand/uL      RBC 4.34 Million/uL      Hemoglobin 11.0 g/dL      Hematocrit 36.4 %      MCV 84 fL      MCH 25.3 pg      MCHC 30.2 g/dL      RDW 14.9 %      MPV 10.4 fL      Platelets 496 Thousands/uL      nRBC 0 /100 WBCs      Neutrophils Relative 70 %      Immat GRANS % 1 %      Lymphocytes Relative 19 %      Monocytes Relative 8 %      Eosinophils Relative 1 %      Basophils Relative 1 %      Neutrophils Absolute 5.93 Thousands/µL      Immature Grans Absolute 0.04 Thousand/uL      Lymphocytes Absolute 1.56 Thousands/µL      Monocytes Absolute 0.67 Thousand/µL      Eosinophils Absolute 0.08 Thousand/µL      Basophils Absolute 0.06 Thousands/µL     HS Troponin 0hr (reflex protocol) [[id number]] Collected: 12/02/23 1623    Lab Status:  In process Specimen: Blood from Arm, Left Updated: 12/02/23 1633    TSH, 3rd generation with Free T4 reflex [[id number]] Collected: 12/02/23 1623    Lab Status: In process Specimen: Blood from Arm, Left Updated: 12/02/23 1633                   XR chest 2 views    (Results Pending)              Procedures  ECG 12 Lead Documentation Only    Date/Time: 12/3/2023 6:24 PM    Performed by: Shara Arthur MD  Authorized by: Shara Arthur MD    ECG reviewed by me, the ED Provider: yes    Patient location:  ED  Interpretation:     Interpretation: normal    Rate:     ECG rate:  69    ECG rate assessment: normal    Rhythm:     Rhythm: sinus rhythm    QRS:     QRS axis:  Normal    QRS intervals:  Normal  Conduction:     Conduction: normal    ST segments:     ST segments:  Normal  T waves:     T waves: normal             ED Course  ED Course as of 12/03/23 1823   Sat Dec 02, 2023   1718 hs TnI 0hr: <2  Episode was just prior to arrival, will check a delta. SBIRT 22yo+      Flowsheet Row Most Recent Value   Initial Alcohol Screen: US AUDIT-C     1. How often do you have a drink containing alcohol? 0 Filed at: 12/02/2023 1636   2. How many drinks containing alcohol do you have on a typical day you are drinking? 0 Filed at: 12/02/2023 1636   3b. FEMALE Any Age, or MALE 65+: How often do you have 4 or more drinks on one occassion? 0 Filed at: 12/02/2023 1636   Audit-C Score 0 Filed at: 12/02/2023 1636   LYNNE: How many times in the past year have you. .. Used an illegal drug or used a prescription medication for non-medical reasons? Never Filed at: 12/02/2023 1636                      Medical Decision Making  1. Syncope - Patient states similar in the past, did have some chest discomfort. Will order ECG and troponin to rule out acute MI, CBC for anemia, metabolic panel for electrolyte abnormalities and dehydration, will give fluids.  Patient will require a delta troponin due to symptom onset just prior to arrival.     Amount and/or Complexity of Data Reviewed  Independent Historian: parent  Labs: ordered. Decision-making details documented in ED Course. Radiology: ordered. ECG/medicine tests: ordered and independent interpretation performed. Details: ECG interpretation located in procedure section of note. Disposition  Final diagnoses:   None     ED Disposition       None          Follow-up Information    None         Patient's Medications   Discharge Prescriptions    No medications on file       No discharge procedures on file.     PDMP Review       None            ED Provider  Electronically Signed by             Shara Arthur MD  12/03/23 5703

## 2023-12-02 NOTE — Clinical Note
Elroy Mi was seen and treated in our emergency department on 12/2/2023. No restrictions            Diagnosis:     Gaurav George  may return to work on return date. She may return on this date: 12/04/2023         If you have any questions or concerns, please don't hesitate to call.       Lydia Guerra, DO    ______________________________           _______________          _______________  Hospital Representative                              Date                                Time

## 2023-12-03 LAB
ATRIAL RATE: 112 BPM
ATRIAL RATE: 79 BPM
P AXIS: 62 DEGREES
P AXIS: 74 DEGREES
PR INTERVAL: 110 MS
PR INTERVAL: 128 MS
QRS AXIS: 76 DEGREES
QRS AXIS: 86 DEGREES
QRSD INTERVAL: 82 MS
QRSD INTERVAL: 84 MS
QT INTERVAL: 316 MS
QT INTERVAL: 394 MS
QTC INTERVAL: 431 MS
QTC INTERVAL: 451 MS
T WAVE AXIS: 51 DEGREES
T WAVE AXIS: 55 DEGREES
VENTRICULAR RATE: 112 BPM
VENTRICULAR RATE: 79 BPM

## 2024-10-31 ENCOUNTER — OFFICE VISIT (OUTPATIENT)
Age: 49
End: 2024-10-31
Payer: COMMERCIAL

## 2024-10-31 VITALS
OXYGEN SATURATION: 99 % | BODY MASS INDEX: 32.96 KG/M2 | HEART RATE: 85 BPM | DIASTOLIC BLOOD PRESSURE: 72 MMHG | WEIGHT: 197.8 LBS | SYSTOLIC BLOOD PRESSURE: 110 MMHG | HEIGHT: 65 IN | RESPIRATION RATE: 21 BRPM

## 2024-10-31 DIAGNOSIS — L71.9 ROSACEA: ICD-10-CM

## 2024-10-31 DIAGNOSIS — R91.1 RIGHT UPPER LOBE PULMONARY NODULE: ICD-10-CM

## 2024-10-31 DIAGNOSIS — N32.81 OVERACTIVE BLADDER: ICD-10-CM

## 2024-10-31 DIAGNOSIS — J45.909 ASTHMA, UNSPECIFIED ASTHMA SEVERITY, UNSPECIFIED WHETHER COMPLICATED, UNSPECIFIED WHETHER PERSISTENT: ICD-10-CM

## 2024-10-31 DIAGNOSIS — Z87.898 HISTORY OF DIZZINESS: ICD-10-CM

## 2024-10-31 DIAGNOSIS — R73.01 IFG (IMPAIRED FASTING GLUCOSE): ICD-10-CM

## 2024-10-31 DIAGNOSIS — I26.99 PULMONARY EMBOLISM, OTHER, UNSPECIFIED CHRONICITY, UNSPECIFIED WHETHER ACUTE COR PULMONALE PRESENT (HCC): ICD-10-CM

## 2024-10-31 DIAGNOSIS — M06.09 RHEUMATOID ARTHRITIS OF MULTIPLE SITES WITH NEGATIVE RHEUMATOID FACTOR (HCC): ICD-10-CM

## 2024-10-31 DIAGNOSIS — Z12.4 CERVICAL CANCER SCREENING: ICD-10-CM

## 2024-10-31 DIAGNOSIS — Z76.89 ENCOUNTER TO ESTABLISH CARE WITH NEW DOCTOR: Primary | ICD-10-CM

## 2024-10-31 PROCEDURE — 99204 OFFICE O/P NEW MOD 45 MIN: CPT | Performed by: INTERNAL MEDICINE

## 2024-10-31 RX ORDER — SEMAGLUTIDE 1 MG/.5ML
1 INJECTION, SOLUTION SUBCUTANEOUS WEEKLY
COMMUNITY
Start: 2024-08-08

## 2024-10-31 RX ORDER — EZETIMIBE 10 MG/1
10 TABLET ORAL DAILY
COMMUNITY
Start: 2024-04-25 | End: 2025-04-25

## 2024-10-31 RX ORDER — METOPROLOL SUCCINATE 100 MG/1
100 TABLET, EXTENDED RELEASE ORAL DAILY
COMMUNITY

## 2024-10-31 RX ORDER — ALBUTEROL SULFATE 90 UG/1
2 INHALANT RESPIRATORY (INHALATION) EVERY 6 HOURS PRN
COMMUNITY

## 2024-10-31 RX ORDER — LINACLOTIDE 145 UG/1
145 CAPSULE, GELATIN COATED ORAL DAILY
COMMUNITY
Start: 2024-10-09

## 2024-10-31 RX ORDER — TIOTROPIUM BROMIDE INHALATION SPRAY 3.12 UG/1
2 SPRAY, METERED RESPIRATORY (INHALATION) DAILY
COMMUNITY

## 2024-10-31 RX ORDER — PANTOPRAZOLE SODIUM 40 MG/1
1 TABLET, DELAYED RELEASE ORAL 2 TIMES DAILY
COMMUNITY
Start: 2024-05-08

## 2024-10-31 RX ORDER — VITAMIN B COMPLEX
1 CAPSULE ORAL DAILY
COMMUNITY
Start: 2024-10-26

## 2024-10-31 RX ORDER — METRONIDAZOLE 7.5 MG/G
GEL TOPICAL DAILY
Qty: 45 G | Refills: 1 | Status: SHIPPED | OUTPATIENT
Start: 2024-10-31

## 2024-10-31 RX ORDER — TOLTERODINE 4 MG/1
4 CAPSULE, EXTENDED RELEASE ORAL DAILY
COMMUNITY
Start: 2024-09-12 | End: 2025-09-12

## 2024-10-31 NOTE — PROGRESS NOTES
Assessment/Plan:           Problem List Items Addressed This Visit       IFG (impaired fasting glucose)    Rosacea    Relevant Medications    metroNIDAZOLE (METROGEL) 0.75 % gel    Pulmonary embolus (HCC)    Rheumatoid arthritis of multiple sites with negative rheumatoid factor (HCC)     Other Visit Diagnoses       Encounter to establish care with new doctor    -  Primary    Cervical cancer screening        Overactive bladder        Relevant Medications    tolterodine (DETROL LA) 4 mg 24 hr capsule    History of dizziness        Right upper lobe pulmonary nodule        Relevant Medications    albuterol (PROVENTIL HFA,VENTOLIN HFA) 90 mcg/act inhaler    Spiriva Respimat 2.5 MCG/ACT AERS inhaler    Asthma, unspecified asthma severity, unspecified whether complicated, unspecified whether persistent        Relevant Medications    albuterol (PROVENTIL HFA,VENTOLIN HFA) 90 mcg/act inhaler    Spiriva Respimat 2.5 MCG/ACT AERS inhaler         Patient is well-established in [facility] and her several medical problems are well-managed by several specialist there.  I will be seeing her back in 6 months.  Will do blood work before next visit.    Subjective:      Patient ID: Myrna Silvestre is a 49 y.o. female.    HPI  Patient is here to establish care.  She has complicated medical history established well [facility] and has past medical diagnosis of rheumatoid arthritis has been under care of Dr. Miller and will be started on Orencia soon.  She has history of DVT and PE for which she is on Eliquis.  She is under care of hematology at [facility].  She did get 1 IV iron infusion for anemia which is felt to be secondary to menorrhagia.  She also has history of questionable thyroiditis for which she is seeing endocrinology [facility] and is on Synthroid.  She had been placed on Wegovy and has lost significant weight on this regimen.  She also seeing pulmonary for asthma and is noted to have right upper lobe  "nodule.  She is also seeing urology for overactive bladder and is on Myrbetriq which seems to be helping her.  She also has been seen by cardiology and had a loop recorder when she had presyncopal syncopal episodes.  So far no significant concerns have been found.  She is instructed to use her motion stocking to help venous pooling.   She has been checked for sleep apnea and does not have sleep.  Last echocardiogram reviewed.   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  She does not smoke.  She is  with 7 children.  The youngest is 16 years of age.  Her  has disability.   Patient had colonoscopy by Dr.Kristen Ruffin which was normal..     The following portions of the patient's history were reviewed and updated as appropriate: allergies, current medications, past family history, past medical history, past social history, past surgical history, and problem list.    Review of Systems   Constitutional:  Positive for fatigue.         Objective:      /72 (BP Location: Left arm, Patient Position: Sitting, Cuff Size: Large)   Pulse 85   Resp 21   Ht 5' 4.5\" (1.638 m)   Wt 89.7 kg (197 lb 12.8 oz)   SpO2 99%   BMI 33.43 kg/m²          Physical Exam  Constitutional:       General: She is not in acute distress.     Appearance: She is well-developed.   HENT:      Head: Normocephalic.      Mouth/Throat:      Pharynx: No oropharyngeal exudate.   Eyes:      General: No scleral icterus.     Conjunctiva/sclera: Conjunctivae normal.   Neck:      Thyroid: No thyromegaly.   Cardiovascular:      Rate and Rhythm: Normal rate and regular rhythm.      Heart sounds: Normal heart sounds. No murmur heard.  Pulmonary:      Effort: Pulmonary effort is normal. No respiratory distress.      Breath sounds: Normal breath sounds. No wheezing or rales.   Abdominal:      General: Bowel sounds are normal. There is no distension.      Palpations: Abdomen is soft.      Tenderness: There is " no abdominal tenderness. There is no guarding or rebound.   Musculoskeletal:      Right lower leg: No edema.      Left lower leg: No edema.   Lymphadenopathy:      Cervical: No cervical adenopathy.   Skin:     General: Skin is warm.      Comments: Rosacea on the cheeks   Neurological:      Mental Status: She is alert and oriented to person, place, and time.      Cranial Nerves: No cranial nerve deficit.      Deep Tendon Reflexes: Reflexes are normal and symmetric.   Psychiatric:         Behavior: Behavior normal.         Thought Content: Thought content normal.         Judgment: Judgment normal.

## 2024-11-01 ENCOUNTER — TELEPHONE (OUTPATIENT)
Dept: ADMINISTRATIVE | Facility: OTHER | Age: 49
End: 2024-11-01

## 2024-11-01 NOTE — TELEPHONE ENCOUNTER
Upon review of the In Basket request we were able to note that no further action is required. The patient chart is up to date as a result of a previous request.      COLONOSCOPY REPORT IS ALREADY LINKED IN      Any additional questions or concerns should be emailed to the Practice Liaisons via the appropriate education email address, please do not reply via In Basket.    Thank you  Criselda Nazario MA   [facility]

## 2024-11-01 NOTE — TELEPHONE ENCOUNTER
----- Message from Yolie S sent at 10/31/2024  3:10 PM EDT -----  Regarding: Update HM - PAP and CRC  Hello,    Can you please update patients HM regarding CRC screening?   She had a colonoscopy done 3/27/2024 with Dr Ariana Ruffin. Results are in Care Everywhere.     Pap is UTD also from 2023, results are under labs.     Thank you.

## 2024-11-01 NOTE — TELEPHONE ENCOUNTER
----- Message from Addis Patiño MD sent at 10/31/2024  4:34 PM EDT -----  Patient had colonoscopy [address] [place] [address] by Dr. Ruffin the report is available in the chart.

## 2024-11-01 NOTE — TELEPHONE ENCOUNTER
Upon review of the In Basket request we were able to locate, review, and update the patient chart as requested for CRC: Colonoscopy.    Repeat colonoscopy in 1-3 years,  has been updated.     Any additional questions or concerns should be emailed to the Practice Liaisons via the appropriate education email address, please do not reply via In Basket.    Thank you  Tami Rizo MA   [facility]

## 2024-11-01 NOTE — TELEPHONE ENCOUNTER
Upon review of the In Basket request we were able to locate, review, and update the patient chart as requested for Pap Smear (HPV) aka Cervical Cancer Screening.    Any additional questions or concerns should be emailed to the Practice Liaisons via the appropriate education email address, please do not reply via In Basket.    Thank you  Criselda Nazario MA   [facility]

## 2024-11-30 ENCOUNTER — HOSPITAL ENCOUNTER (EMERGENCY)
Facility: HOSPITAL | Age: 49
Discharge: HOME | End: 2024-11-30
Attending: EMERGENCY MEDICINE
Payer: COMMERCIAL

## 2024-11-30 ENCOUNTER — APPOINTMENT (EMERGENCY)
Dept: ULTRASOUND IMAGING | Facility: HOSPITAL | Age: 49
End: 2024-11-30
Payer: COMMERCIAL

## 2024-11-30 VITALS
DIASTOLIC BLOOD PRESSURE: 67 MMHG | OXYGEN SATURATION: 100 % | TEMPERATURE: 97.7 F | HEART RATE: 76 BPM | RESPIRATION RATE: 18 BRPM | WEIGHT: 192.02 LBS | SYSTOLIC BLOOD PRESSURE: 115 MMHG | BODY MASS INDEX: 32.45 KG/M2

## 2024-11-30 DIAGNOSIS — N93.9 VAGINAL BLEEDING: Primary | ICD-10-CM

## 2024-11-30 LAB
ABO GROUP BLD: NORMAL
ALBUMIN SERPL BCG-MCNC: 4.3 G/DL (ref 3.5–5)
ALP SERPL-CCNC: 48 U/L (ref 34–104)
ALT SERPL W P-5'-P-CCNC: 9 U/L (ref 7–52)
ANION GAP SERPL CALCULATED.3IONS-SCNC: 6 MMOL/L (ref 4–13)
AST SERPL W P-5'-P-CCNC: 10 U/L (ref 13–39)
BACTERIA UR QL AUTO: ABNORMAL /HPF
BASOPHILS # BLD AUTO: 0.05 THOUSANDS/ΜL (ref 0–0.1)
BASOPHILS NFR BLD AUTO: 1 % (ref 0–1)
BILIRUB SERPL-MCNC: 0.28 MG/DL (ref 0.2–1)
BILIRUB UR QL STRIP: NEGATIVE
BLD GP AB SCN SERPL QL: NEGATIVE
BUN SERPL-MCNC: 9 MG/DL (ref 5–25)
CALCIUM SERPL-MCNC: 9.4 MG/DL (ref 8.4–10.2)
CHLORIDE SERPL-SCNC: 103 MMOL/L (ref 96–108)
CLARITY UR: CLEAR
CO2 SERPL-SCNC: 28 MMOL/L (ref 21–32)
COLOR UR: ABNORMAL
CREAT SERPL-MCNC: 0.65 MG/DL (ref 0.6–1.3)
EOSINOPHIL # BLD AUTO: 0.07 THOUSAND/ΜL (ref 0–0.61)
EOSINOPHIL NFR BLD AUTO: 1 % (ref 0–6)
ERYTHROCYTE [DISTWIDTH] IN BLOOD BY AUTOMATED COUNT: 13.7 % (ref 11.6–15.1)
GFR SERPL CREATININE-BSD FRML MDRD: 104 ML/MIN/1.73SQ M
GLUCOSE SERPL-MCNC: 88 MG/DL (ref 65–140)
GLUCOSE UR STRIP-MCNC: NEGATIVE MG/DL
HCT VFR BLD AUTO: 36.2 % (ref 34.8–46.1)
HGB BLD-MCNC: 12 G/DL (ref 11.5–15.4)
HGB UR QL STRIP.AUTO: ABNORMAL
IMM GRANULOCYTES # BLD AUTO: 0.02 THOUSAND/UL (ref 0–0.2)
IMM GRANULOCYTES NFR BLD AUTO: 0 % (ref 0–2)
KETONES UR STRIP-MCNC: NEGATIVE MG/DL
LEUKOCYTE ESTERASE UR QL STRIP: ABNORMAL
LYMPHOCYTES # BLD AUTO: 1.76 THOUSANDS/ΜL (ref 0.6–4.47)
LYMPHOCYTES NFR BLD AUTO: 33 % (ref 14–44)
MCH RBC QN AUTO: 30.7 PG (ref 26.8–34.3)
MCHC RBC AUTO-ENTMCNC: 33.1 G/DL (ref 31.4–37.4)
MCV RBC AUTO: 93 FL (ref 82–98)
MONOCYTES # BLD AUTO: 0.49 THOUSAND/ΜL (ref 0.17–1.22)
MONOCYTES NFR BLD AUTO: 9 % (ref 4–12)
MUCOUS THREADS UR QL AUTO: ABNORMAL
NEUTROPHILS # BLD AUTO: 2.89 THOUSANDS/ΜL (ref 1.85–7.62)
NEUTS SEG NFR BLD AUTO: 56 % (ref 43–75)
NITRITE UR QL STRIP: NEGATIVE
NON-SQ EPI CELLS URNS QL MICRO: ABNORMAL /HPF
NRBC BLD AUTO-RTO: 0 /100 WBCS
PH UR STRIP.AUTO: 6 [PH]
PLATELET # BLD AUTO: 342 THOUSANDS/UL (ref 149–390)
PMV BLD AUTO: 10.3 FL (ref 8.9–12.7)
POTASSIUM SERPL-SCNC: 3.9 MMOL/L (ref 3.5–5.3)
PROT SERPL-MCNC: 7.8 G/DL (ref 6.4–8.4)
PROT UR STRIP-MCNC: ABNORMAL MG/DL
RBC # BLD AUTO: 3.91 MILLION/UL (ref 3.81–5.12)
RBC #/AREA URNS AUTO: ABNORMAL /HPF
RH BLD: NEGATIVE
SODIUM SERPL-SCNC: 137 MMOL/L (ref 135–147)
SP GR UR STRIP.AUTO: 1.01 (ref 1–1.03)
SPECIMEN EXPIRATION DATE: NORMAL
UROBILINOGEN UR STRIP-ACNC: <2 MG/DL
WBC # BLD AUTO: 5.28 THOUSAND/UL (ref 4.31–10.16)
WBC #/AREA URNS AUTO: ABNORMAL /HPF

## 2024-11-30 PROCEDURE — 86900 BLOOD TYPING SEROLOGIC ABO: CPT

## 2024-11-30 PROCEDURE — 99284 EMERGENCY DEPT VISIT MOD MDM: CPT

## 2024-11-30 PROCEDURE — 87086 URINE CULTURE/COLONY COUNT: CPT

## 2024-11-30 PROCEDURE — 76856 US EXAM PELVIC COMPLETE: CPT

## 2024-11-30 PROCEDURE — 99285 EMERGENCY DEPT VISIT HI MDM: CPT

## 2024-11-30 PROCEDURE — 93005 ELECTROCARDIOGRAM TRACING: CPT

## 2024-11-30 PROCEDURE — 86901 BLOOD TYPING SEROLOGIC RH(D): CPT

## 2024-11-30 PROCEDURE — 36415 COLL VENOUS BLD VENIPUNCTURE: CPT

## 2024-11-30 PROCEDURE — 80053 COMPREHEN METABOLIC PANEL: CPT

## 2024-11-30 PROCEDURE — 76830 TRANSVAGINAL US NON-OB: CPT

## 2024-11-30 PROCEDURE — 81001 URINALYSIS AUTO W/SCOPE: CPT

## 2024-11-30 PROCEDURE — 85025 COMPLETE CBC W/AUTO DIFF WBC: CPT

## 2024-11-30 PROCEDURE — 86850 RBC ANTIBODY SCREEN: CPT

## 2024-11-30 RX ORDER — MEDROXYPROGESTERONE ACETATE 2.5 MG/1
5 TABLET ORAL ONCE
Status: COMPLETED | OUTPATIENT
Start: 2024-11-30 | End: 2024-11-30

## 2024-11-30 RX ORDER — MEDROXYPROGESTERONE ACETATE 5 MG
5 TABLET ORAL 2 TIMES DAILY
Qty: 10 TABLET | Refills: 0 | Status: SHIPPED | OUTPATIENT
Start: 2024-11-30 | End: 2024-12-12 | Stop reason: ALTCHOICE

## 2024-11-30 RX ADMIN — MEDROXYPROGESTERONE ACETATE 5 MG: 2.5 TABLET ORAL at 19:42

## 2024-11-30 NOTE — ED PROVIDER NOTES
"Time reflects when diagnosis was documented in both MDM as applicable and the Disposition within this note       Time User Action Codes Description Comment    11/30/2024  7:10 PM Daisy Moses [N93.9] Vaginal bleeding           ED Disposition       ED Disposition   Discharge    Condition   Stable    Date/Time   Sat Nov 30, 2024  7:13 PM    Comment   Myrna Silvestre discharge to home/self care.                   Assessment & Plan       Medical Decision Making  Heavy vaginal bleeding on eliquis x 3 weeks.   Ddx includes vaginal trauma, abnormal uterine bleeding, fibroid, bleeding disorder, menopause, malignancy    Pelvic exam atraumatic without copious bleeding. Us shows heterogeneous endometrial echo complex without discrete measurable mass or suspicious hypervascularity and ovarian cyst. Spoke to Ob/gyn resident Dr. Carvalho. Hgb stable at 12.0, HR normal, patient is well appearing. 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 Ob/Gyn recommended Provera. Discussed risks of this medication with the patient. Patient is agreeable. Discussed follow-up with Ob-gyn. Established with Ob/gyn, requests referral for [facility].     Discussed findings from the visit with the patient.  We had a conversation regarding supportive care and indications for return.  Recommended appropriate follow-up.  Patient and/or family understand and agree with plan.    Portions of the record may have been created with voice recognition software. Occasional use of the incorrect word or \"sound a like\" substitutions may have occurred due to the inherent limitations of voice recognition software. Read the chart carefully and recognize, using context, where substitutions have occurred.           Amount and/or Complexity of Data Reviewed  Labs: ordered. Decision-making details documented in ED Course.  Radiology: ordered.    Risk  Prescription drug management.        ED Course as of 12/01/24 0116   Sat Nov 30, 2024   1659 " Hemoglobin: 12.0  Hx anemia, blood count stable.     Message sent to Ob       Medications   medroxyPROGESTERone (PROVERA) tablet 5 mg (5 mg Oral Given 24)       ED Risk Strat Scores                           SBIRT 22yo+      Flowsheet Row Most Recent Value   Initial Alcohol Screen: US AUDIT-C     1. How often do you have a drink containing alcohol? 0 Filed at: 2024 1531   2. How many drinks containing alcohol do you have on a typical day you are drinking?  0 Filed at: 2024 1531   3b. FEMALE Any Age, or MALE 65+: How often do you have 4 or more drinks on one occassion? 0 Filed at: 2024 1531   Audit-C Score 0 Filed at: 2024 1531   LYNNE: How many times in the past year have you...    Used an illegal drug or used a prescription medication for non-medical reasons? Never Filed at: 2024 1531                            History of Present Illness       Chief Complaint   Patient presents with    Vaginal Bleeding     Pt reports vaginal bleeding since . Recently had blood transfusion due to low hemoglobin & is on a blood thinner. Denies pain. Changing her pad 3-4x an hour. Feeling dizzy & weak        Past Medical History:   Diagnosis Date    Arthritis     Disease of thyroid gland     Fibromyalgia     Scoliosis       Past Surgical History:   Procedure Laterality Date     SECTION      CHOLECYSTECTOMY        Family History   Problem Relation Age of Onset    Cancer Mother         ovary    Diabetes Mother     Cancer Maternal Aunt         ovary    Leukemia Maternal Aunt     Heart disease Neg Hx       Social History     Tobacco Use    Smoking status: Never    Smokeless tobacco: Never   Vaping Use    Vaping status: Never Used   Substance Use Topics    Alcohol use: No    Drug use: No      E-Cigarette/Vaping    E-Cigarette Use Never User       E-Cigarette/Vaping Substances      I have reviewed and agree with the history as documented.     49-year-old female presenting to the  emergency department for vaginal bleeding x 3 weeks.  She reports that she started a normal menstrual cycle 11/9/2024.  Her first 2 days were very heavy. She notes it slowed on 11/20 but then returned heavy. States that she is fully saturating 3-4 pads per hour. Denies hot flashes, fevers, abdominal pain, urinary symptoms. 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 She reports being seen by a hematologist that recommends she continues eliquis indefinitely.       Vaginal Bleeding  Associated symptoms: no abdominal pain, no dysuria, no fever, no nausea and no vaginal discharge        Review of Systems   Constitutional:  Negative for chills and fever.   Gastrointestinal:  Negative for abdominal pain, blood in stool, constipation, nausea and vomiting.   Genitourinary:  Positive for menstrual problem and vaginal bleeding. Negative for decreased urine volume, difficulty urinating, dysuria, flank pain, frequency, genital sores, pelvic pain, urgency, vaginal discharge and vaginal pain.   Skin:  Negative for rash.   Neurological:  Negative for light-headedness and numbness.           Objective       ED Triage Vitals [11/30/24 1531]   Temperature Pulse Blood Pressure Respirations SpO2 Patient Position - Orthostatic VS   97.7 °F (36.5 °C) 82 139/65 17 96 % Sitting      Temp Source Heart Rate Source BP Location FiO2 (%) Pain Score    Oral Monitor Right arm -- --      Vitals      Date and Time Temp Pulse SpO2 Resp BP Pain Score FACES Pain Rating User   11/30/24 1937 -- 76 100 % 18 115/67 -- -- DM   11/30/24 1531 97.7 °F (36.5 °C) 82 96 % 17 139/65 -- -- GABRIELLA            Physical Exam  Vitals and nursing note reviewed.   Constitutional:       General: She is not in acute distress.     Appearance: She is well-developed. She is not ill-appearing.   HENT:      Head: Normocephalic and atraumatic.   Eyes:      Conjunctiva/sclera: Conjunctivae normal.   Cardiovascular:      Rate and Rhythm: Normal rate and regular rhythm.       Pulses: Normal pulses.      Heart sounds: Normal heart sounds. No murmur heard.  Pulmonary:      Effort: Pulmonary effort is normal. No respiratory distress.      Breath sounds: Normal breath sounds. No wheezing, rhonchi or rales.   Abdominal:      General: There is no distension.      Palpations: Abdomen is soft.      Tenderness: There is no abdominal tenderness.   Genitourinary:     General: Normal vulva.      Comments: Mild bleeding from the cervical os. No lacerations or clots.   Musculoskeletal:         General: No swelling.      Cervical back: Neck supple.   Skin:     General: Skin is warm and dry.      Capillary Refill: Capillary refill takes less than 2 seconds.   Neurological:      Mental Status: She is alert.   Psychiatric:         Mood and Affect: Mood normal.         Results Reviewed       Procedure Component Value Units Date/Time    Urine Microscopic [[id number]]  (Abnormal) Collected: 11/30/24 1619    Lab Status: Final result Specimen: Urine, Clean Catch Updated: 11/30/24 1653     RBC, UA Innumerable /hpf      WBC, UA 20-30 /hpf      Epithelial Cells Occasional /hpf      Bacteria, UA Occasional /hpf      MUCUS THREADS Occasional    Urine culture [[id number]] Collected: 11/30/24 1619    Lab Status: In process Specimen: Urine, Clean Catch Updated: 11/30/24 1653    Comprehensive metabolic panel [[id number]]  (Abnormal) Collected: 11/30/24 1620    Lab Status: Final result Specimen: Blood from Arm, Left Updated: 11/30/24 1644     Sodium 137 mmol/L      Potassium 3.9 mmol/L      Chloride 103 mmol/L      CO2 28 mmol/L      ANION GAP 6 mmol/L      BUN 9 mg/dL      Creatinine 0.65 mg/dL      Glucose 88 mg/dL      Calcium 9.4 mg/dL      AST 10 U/L      ALT 9 U/L      Alkaline Phosphatase 48 U/L      Total Protein 7.8 g/dL      Albumin 4.3 g/dL      Total Bilirubin 0.28 mg/dL      eGFR 104 ml/min/1.73sq m     Narrative:      [employer] guidelines for Chronic Kidney Disease (CKD):     Stage 1  with normal or high GFR (GFR > 90 mL/min/1.73 square meters)    Stage 2 Mild CKD (GFR = 60-89 mL/min/1.73 square meters)    Stage 3A Moderate CKD (GFR = 45-59 mL/min/1.73 square meters)    Stage 3B Moderate CKD (GFR = 30-44 mL/min/1.73 square meters)    Stage 4 Severe CKD (GFR = 15-29 mL/min/1.73 square meters)    Stage 5 End Stage CKD (GFR <15 mL/min/1.73 square meters)  Note: GFR calculation is accurate only with a steady state creatinine    UA w Reflex to Microscopic w Reflex to Culture [[id number]]  (Abnormal) Collected: 11/30/24 1619    Lab Status: Final result Specimen: Urine, Clean Catch Updated: 11/30/24 1640     Color, UA Light Brown     Clarity, UA Clear     Specific Gravity, UA 1.007     pH, UA 6.0     Leukocytes, UA Trace     Nitrite, UA Negative     Protein, UA 30 (1+) mg/dl      Glucose, UA Negative mg/dl      Ketones, UA Negative mg/dl      Urobilinogen, UA <2.0 mg/dl      Bilirubin, UA Negative     Occult Blood, UA Large    CBC and differential [[id number]] Collected: 11/30/24 1620    Lab Status: Final result Specimen: Blood from Arm, Left Updated: 11/30/24 1628     WBC 5.28 Thousand/uL      RBC 3.91 Million/uL      Hemoglobin 12.0 g/dL      Hematocrit 36.2 %      MCV 93 fL      MCH 30.7 pg      MCHC 33.1 g/dL      RDW 13.7 %      MPV 10.3 fL      Platelets 342 Thousands/uL      nRBC 0 /100 WBCs      Segmented % 56 %      Immature Grans % 0 %      Lymphocytes % 33 %      Monocytes % 9 %      Eosinophils Relative 1 %      Basophils Relative 1 %      Absolute Neutrophils 2.89 Thousands/µL      Absolute Immature Grans 0.02 Thousand/uL      Absolute Lymphocytes 1.76 Thousands/µL      Absolute Monocytes 0.49 Thousand/µL      Eosinophils Absolute 0.07 Thousand/µL      Basophils Absolute 0.05 Thousands/µL             US pelvis complete w transvaginal   Final Interpretation by Rosibel Luna MD (11/30 1829)      Heterogeneous endometrial echo complex without discrete measurable mass or  "suspicious hypervascularity. Caliber within normal limits for age.      O RADS 2 right ovarian cyst for which follow-up in 6 months is recommended.                                 Workstation performed: [id number]             Procedures    ED Medication and Procedure Management   Prior to Admission Medications   Prescriptions Last Dose Informant Patient Reported? Taking?   Abatacept (Orencia ClickJect) 125 MG/ML SOAJ   Yes No   Sig: Inject 125 mg under the skin   Ascorbic Acid (vitamin C) 100 MG tablet   Yes No   Sig: Take 100 mg by mouth daily   DULoxetine (CYMBALTA) 60 mg delayed release capsule   Yes No   Sig: Take 60 mg by mouth daily   Eliquis 5 MG   Yes No   Sig: Take 5 mg by mouth 2 (two) times a day   Ergocalciferol (VITAMIN D2 PO)   Yes No   Sig: Take 50,000 Units by mouth once a week   FOLIC ACID PO   Yes No   Sig: Take by mouth   FeroSul 325 (65 Fe) MG tablet   Yes No   Sig: Take 1 tablet by mouth daily with breakfast   Patient not taking: Reported on 7/1/2023   Linzess 145 MCG CAPS   Yes No   Sig: Take 145 mcg by mouth daily   Methotrexate Sodium (methotrexate, PF,) 250 MG/10ML injection   Yes No   Sig: Inject 25 mg under the skin once a week   Mirabegron ER (Myrbetriq) 50 MG TB24   Yes No   Sig: Take 50 mg by mouth daily   SUMAtriptan (IMITREX) 50 mg tablet   Yes No   Sig: Take 50 mg by mouth   Patient not taking: Reported on 6/14/2022   Spiriva Respimat 2.5 MCG/ACT AERS inhaler   Yes No   Sig: Inhale 2 puffs daily   Syringe/Needle, Disp, 27G X 1/2\" 1 ML MISC   Yes No   Sig: As directed with methotrexate use   Wegovy 1 MG/0.5ML   Yes No   Sig: Inject 1 mg under the skin Once a week   albuterol (PROVENTIL HFA,VENTOLIN HFA) 90 mcg/act inhaler   Yes No   Sig: Inhale 2 puffs every 6 (six) hours as needed for wheezing   b complex vitamins capsule   Yes No   Sig: Take 1 capsule by mouth in the morning   buPROPion (WELLBUTRIN SR) 150 mg 12 hr tablet   Yes No   Sig: Take 150 mg by mouth in the morning "   busPIRone (BUSPAR) 15 mg tablet   Yes No   Sig: Take 15 mg by mouth 2 (two) times a day   dicyclomine (BENTYL) 20 mg tablet   No No   Sig: Take 1 tablet (20 mg total) by mouth every 6 (six) hours as needed (diarrhea, abdominal cramps)   ezetimibe (ZETIA) 10 mg tablet   Yes No   Sig: Take 10 mg by mouth daily   levothyroxine 100 mcg tablet   Yes No   Sig: Take 100 mcg by mouth daily 100 mcg Monday through Friday 88 Saturday and Sunday   lubiprostone (AMITIZA) 24 mcg capsule   Yes No   Sig: TAKE 1 CAPSULE (24 MCG TOTAL) BY MOUTH 2 (TWO) TIMES A DAY WITH MEALS   Patient not taking: Reported on 10/31/2024   meclizine (ANTIVERT) 12.5 MG tablet   Yes No   Sig: Take 12.5 mg by mouth daily as needed for dizziness or nausea   metFORMIN (GLUCOPHAGE-XR) 750 mg 24 hr tablet   Yes No   Sig: Take 750 mg by mouth daily with breakfast   metoprolol succinate (TOPROL-XL) 100 mg 24 hr tablet   Yes No   Sig: Take 100 mg by mouth daily   metoprolol tartrate (LOPRESSOR) 25 mg tablet   Yes No   Sig: Take 25 mg by mouth 2 (two) times a day   Patient not taking: Reported on 10/31/2024   metroNIDAZOLE (METROGEL) 0.75 % gel   No No   Sig: Apply topically in the morning   ondansetron (ZOFRAN-ODT) 4 mg disintegrating tablet   No No   Sig: Take 1 tablet (4 mg total) by mouth every 6 (six) hours as needed for nausea   pantoprazole (PROTONIX) 40 mg tablet   Yes No   Sig: Take 1 tablet by mouth 2 (two) times a day   predniSONE 10 mg tablet   Yes No   Sig: PLEASE TAKE 1 TABLET BY MOUTH DAILY UNTIL YOUR NEXT VISIT   Patient not taking: No sig reported   rosuvastatin (CRESTOR) 40 MG tablet   Yes No   Sig: Take 40 mg by mouth daily   spironolactone (ALDACTONE) 50 mg tablet   Yes No   Sig: Take 50 mg by mouth daily   tolterodine (DETROL LA) 4 mg 24 hr capsule   Yes No   Sig: Take 4 mg by mouth daily   vitamin A 3 MG (43924 UT) capsule   Yes No   Sig: Take 10,000 Units by mouth daily   vitamin E, tocopherol, 400 units capsule   Yes No   Sig: Take 400  Units by mouth daily      Facility-Administered Medications: None     Discharge Medication List as of 11/30/2024  7:13 PM        CONTINUE these medications which have NOT CHANGED    Details   Abatacept (Orencia ClickJect) 125 MG/ML SOAJ Inject 125 mg under the skin, Starting Mon 11/20/2023, Until Thu 10/31/2024 at 2359, Historical Med      albuterol (PROVENTIL HFA,VENTOLIN HFA) 90 mcg/act inhaler Inhale 2 puffs every 6 (six) hours as needed for wheezing, Historical Med      Ascorbic Acid (vitamin C) 100 MG tablet Take 100 mg by mouth daily, Historical Med      b complex vitamins capsule Take 1 capsule by mouth in the morning, Starting Sat 10/26/2024, Historical Med      buPROPion (WELLBUTRIN SR) 150 mg 12 hr tablet Take 150 mg by mouth in the morning, Starting Thu 10/5/2023, Historical Med      busPIRone (BUSPAR) 15 mg tablet Take 15 mg by mouth 2 (two) times a day, Starting Tue 10/17/2023, Historical Med      dicyclomine (BENTYL) 20 mg tablet Take 1 tablet (20 mg total) by mouth every 6 (six) hours as needed (diarrhea, abdominal cramps), Starting Sat 7/1/2023, Normal      DULoxetine (CYMBALTA) 60 mg delayed release capsule Take 60 mg by mouth daily, Starting Mon 4/3/2023, Until Thu 10/31/2024, Historical Med      Eliquis 5 MG Take 5 mg by mouth 2 (two) times a day, Starting Sun 6/5/2022, Historical Med      Ergocalciferol (VITAMIN D2 PO) Take 50,000 Units by mouth once a week, Starting Mon 3/16/2015, Historical Med      ezetimibe (ZETIA) 10 mg tablet Take 10 mg by mouth daily, Starting Thu 4/25/2024, Until Fri 4/25/2025, Historical Med      FeroSul 325 (65 Fe) MG tablet Take 1 tablet by mouth daily with breakfast, Starting Wed 9/15/2021, Historical Med      FOLIC ACID PO Take by mouth, Historical Med      levothyroxine 100 mcg tablet Take 100 mcg by mouth daily 100 mcg Monday through Friday 88 Saturday and Sunday, Historical Med      Linzess 145 MCG CAPS Take 145 mcg by mouth daily, Starting Wed 10/9/2024,  "Historical Med      lubiprostone (AMITIZA) 24 mcg capsule TAKE 1 CAPSULE (24 MCG TOTAL) BY MOUTH 2 (TWO) TIMES A DAY WITH MEALS, Historical Med      meclizine (ANTIVERT) 12.5 MG tablet Take 12.5 mg by mouth daily as needed for dizziness or nausea, Historical Med      metFORMIN (GLUCOPHAGE-XR) 750 mg 24 hr tablet Take 750 mg by mouth daily with breakfast, Starting Tue 10/17/2023, Until Thu 10/31/2024, Historical Med      Methotrexate Sodium (methotrexate, PF,) 250 MG/10ML injection Inject 25 mg under the skin once a week, Starting Fri 9/8/2023, Until Thu 10/31/2024, Historical Med      metoprolol succinate (TOPROL-XL) 100 mg 24 hr tablet Take 100 mg by mouth daily, Historical Med      metoprolol tartrate (LOPRESSOR) 25 mg tablet Take 25 mg by mouth 2 (two) times a day, Starting Wed 4/12/2023, Until Thu 4/11/2024, Historical Med      metroNIDAZOLE (METROGEL) 0.75 % gel Apply topically in the morning, Starting Thu 10/31/2024, Normal      Mirabegron ER (Myrbetriq) 50 MG TB24 Take 50 mg by mouth daily, Starting Thu 11/16/2023, Until Fri 11/15/2024, Historical Med      ondansetron (ZOFRAN-ODT) 4 mg disintegrating tablet Take 1 tablet (4 mg total) by mouth every 6 (six) hours as needed for nausea, Starting Sat 7/1/2023, Normal      pantoprazole (PROTONIX) 40 mg tablet Take 1 tablet by mouth 2 (two) times a day, Starting Wed 5/8/2024, Historical Med      predniSONE 10 mg tablet PLEASE TAKE 1 TABLET BY MOUTH DAILY UNTIL YOUR NEXT VISIT, Historical Med      rosuvastatin (CRESTOR) 40 MG tablet Take 40 mg by mouth daily, Starting Thu 7/13/2023, Historical Med      Spiriva Respimat 2.5 MCG/ACT AERS inhaler Inhale 2 puffs daily, Historical Med      spironolactone (ALDACTONE) 50 mg tablet Take 50 mg by mouth daily, Starting Tue 3/4/2014, Historical Med      SUMAtriptan (IMITREX) 50 mg tablet Take 50 mg by mouth, Starting Tue 4/11/2017, Historical Med      Syringe/Needle, Disp, 27G X 1/2\" 1 ML MISC As directed with methotrexate " use, Starting Fri 6/21/2024, Historical Med      tolterodine (DETROL LA) 4 mg 24 hr capsule Take 4 mg by mouth daily, Starting Thu 9/12/2024, Until Fri 9/12/2025, Historical Med      vitamin A 3 MG (18417 UT) capsule Take 10,000 Units by mouth daily, Historical Med      vitamin E, tocopherol, 400 units capsule Take 400 Units by mouth daily, Historical Med      Wegovy 1 MG/0.5ML Inject 1 mg under the skin Once a week, Starting Thu 8/8/2024, Historical Med             ED SEPSIS DOCUMENTATION   Time reflects when diagnosis was documented in both MDM as applicable and the Disposition within this note       Time User Action Codes Description Comment    11/30/2024  7:10 PM Daisy Moses Add [N93.9] Vaginal bleeding                  Daisy Moses PA-C  12/01/24 0116

## 2024-12-01 LAB
ATRIAL RATE: 76 BPM
BACTERIA UR CULT: NORMAL
P AXIS: 56 DEGREES
PR INTERVAL: 102 MS
QRS AXIS: 78 DEGREES
QRSD INTERVAL: 86 MS
QT INTERVAL: 390 MS
QTC INTERVAL: 438 MS
T WAVE AXIS: 64 DEGREES
VENTRICULAR RATE: 76 BPM

## 2024-12-01 PROCEDURE — 93010 ELECTROCARDIOGRAM REPORT: CPT

## 2024-12-01 NOTE — DISCHARGE INSTRUCTIONS
Follow-up with your Ob/gyn regarding an endometrial biopsy.   Complete 5 day course of Provera to help slow the bleeding.  Return for new or worsening symptoms.

## 2024-12-12 ENCOUNTER — CONSULT (OUTPATIENT)
Dept: GYNECOLOGY | Facility: CLINIC | Age: 49
End: 2024-12-12
Payer: COMMERCIAL

## 2024-12-12 VITALS — DIASTOLIC BLOOD PRESSURE: 60 MMHG | SYSTOLIC BLOOD PRESSURE: 110 MMHG | BODY MASS INDEX: 32.58 KG/M2 | WEIGHT: 192.8 LBS

## 2024-12-12 DIAGNOSIS — N83.201 RIGHT OVARIAN CYST: ICD-10-CM

## 2024-12-12 DIAGNOSIS — N93.9 VAGINAL BLEEDING: ICD-10-CM

## 2024-12-12 DIAGNOSIS — Z11.51 SCREENING FOR HUMAN PAPILLOMAVIRUS (HPV): ICD-10-CM

## 2024-12-12 DIAGNOSIS — N92.1 MENOMETRORRHAGIA: Primary | ICD-10-CM

## 2024-12-12 PROCEDURE — 58100 BIOPSY OF UTERUS LINING: CPT | Performed by: OBSTETRICS & GYNECOLOGY

## 2024-12-12 PROCEDURE — G0145 SCR C/V CYTO,THINLAYER,RESCR: HCPCS | Performed by: OBSTETRICS & GYNECOLOGY

## 2024-12-12 PROCEDURE — 99203 OFFICE O/P NEW LOW 30 MIN: CPT | Performed by: OBSTETRICS & GYNECOLOGY

## 2024-12-12 PROCEDURE — G0476 HPV COMBO ASSAY CA SCREEN: HCPCS | Performed by: OBSTETRICS & GYNECOLOGY

## 2024-12-12 PROCEDURE — 88305 TISSUE EXAM BY PATHOLOGIST: CPT | Performed by: PATHOLOGY

## 2024-12-12 RX ORDER — FERROUS SULFATE 324(65)MG
324 TABLET, DELAYED RELEASE (ENTERIC COATED) ORAL 2 TIMES DAILY
Qty: 60 TABLET | Refills: 3 | Status: SHIPPED | OUTPATIENT
Start: 2024-12-12

## 2024-12-12 RX ORDER — NORETHINDRONE 5 MG/1
5 TABLET ORAL DAILY
Qty: 30 TABLET | Refills: 1 | Status: SHIPPED | OUTPATIENT
Start: 2024-12-12

## 2024-12-12 NOTE — PROGRESS NOTES
Assessment & Plan   Diagnoses and all orders for this visit:    Menometrorrhagia  -     ferrous sulfate 324 (65 Fe) mg; Take 1 tablet (324 mg total) by mouth 2 (two) times a day  -     norethindrone (Aygestin) 5 mg tablet; Take 1 tablet (5 mg total) by mouth daily    Vaginal bleeding  -     Ambulatory Referral to Obstetrics / Gynecology    Right ovarian cyst    1.  Menometrorrhagia-normally, has menstrual cycles monthly lasting 5 to 7 days changing pad 1-3 times per day.  Started bleeding 11/10/2024, states she has been changing pad up to 3-4 times per hour.  She was seen in the emergency room on 11/30/2024.  At that time, had hemoglobin of 12.0.  Previous TSH was normal.  Ultrasound demonstrated some heterogeneous fluid in the endometrium without any definite focal findings.  Right ovarian cyst was seen as noted below as well.  She was prescribed Provera which she took twice daily for 5 days with improvement of bleeding but then the heavy bleedin resumed after stopping that medication.  Reviewed all of this with the patient and her .    -Endometrial biopsy was done and we will inform her of the findings.    -Pap smear was done  -Iron prescription to take twice daily was sent to pharmacy  -Suggested Aygestin to try to help slow the bleeding.  Aygestin 5 mg daily was sent, #30 refill 1.  Counseled that Aygestin could be associated with increased risk for blood clot.  Should she notice any chest discomfort or shortness of breath, would seek evaluation.  -Return to the near future for sonohysterogram to evaluate the thickened endometrium.  2. right ovarian cyst-3.4 cm cyst versus 2 adjacent cysts were noted on ultrasound from 11/30/2024.  Felt to be benign, or RADS 2 with recommendation for follow-up at 6 months time given.  We will evaluate at upcoming sonohysterogram.  3. history of anemia-did have iron infusion last done August 2024.  She did see hematologist October and follow-up.  Her most recent hemoglobin  was 12.0 on 2024.  She is concerned about getting anemia due to the amount of bleeding.  Iron prescription was sent as noted above, twice daily.  4. history of  x 5,  x 2  5. tubal ligation-none with last   6. history of hypothyroidism-TSH was normal from 2024  7. history of DVT/PE x 2-had pulmonary embolus with pregnancy in .  Had DVT in her calf after a fall at work.  Had pulmonary embolus in  with COVID.  Has been on Eliquis since 2017.  She continues to follow-up with hematology.  8.  History of urinary incontinence-Trost p.m. 20 mg twice daily  Follow-up near future for sonohysterogram with me or as needed.    Subjective   Patient ID: Myrna Silvestre is a 49 y.o. female.    Vitals:    24 1455   BP: 110/60     HPI    The following portions of the patient's history were reviewed and updated as appropriate: allergies, current medications, past family history, past medical history, past social history, past surgical history, and problem list.  Past Medical History:   Diagnosis Date    Arthritis     Disease of thyroid gland     Fibromyalgia     Scoliosis      Past Surgical History:   Procedure Laterality Date     SECTION      CHOLECYSTECTOMY       OB History    Para Term  AB Living   8 7 6 1 1 7   SAB IAB Ectopic Multiple Live Births   1    7      # Outcome Date GA Lbr [place]/2nd Weight Sex Type Anes PTL Lv   8      M CS-Unspec      7 Term     F Vag-Spont      6 Term     F Vag-Spont      5 Term     M Vag-Spont      4 SAB            3 Term     M Vag-Spont      2 Term     F       1 Term     F CS-Unspec          Current Outpatient Medications:     albuterol (PROVENTIL HFA,VENTOLIN HFA) 90 mcg/act inhaler, Inhale 2 puffs every 6 (six) hours as needed for wheezing, Disp: , Rfl:     Ascorbic Acid (vitamin C) 100 MG tablet, Take 100 mg by mouth daily, Disp: , Rfl:     b complex vitamins capsule, Take 1 capsule by mouth in the morning, Disp: , Rfl:      "buPROPion (WELLBUTRIN SR) 150 mg 12 hr tablet, Take 150 mg by mouth in the morning, Disp: , Rfl:     busPIRone (BUSPAR) 15 mg tablet, Take 15 mg by mouth 2 (two) times a day, Disp: , Rfl:     dicyclomine (BENTYL) 20 mg tablet, Take 1 tablet (20 mg total) by mouth every 6 (six) hours as needed (diarrhea, abdominal cramps), Disp: 15 tablet, Rfl: 0    Eliquis 5 MG, Take 5 mg by mouth 2 (two) times a day, Disp: , Rfl:     Ergocalciferol (VITAMIN D2 PO), Take 50,000 Units by mouth once a week, Disp: , Rfl:     ezetimibe (ZETIA) 10 mg tablet, Take 10 mg by mouth daily, Disp: , Rfl:     FOLIC ACID PO, Take by mouth, Disp: , Rfl:     levothyroxine 100 mcg tablet, Take 100 mcg by mouth daily 100 mcg Monday through Friday 88 Saturday and Sunday, Disp: , Rfl:     Linzess 145 MCG CAPS, Take 145 mcg by mouth daily, Disp: , Rfl:     meclizine (ANTIVERT) 12.5 MG tablet, Take 12.5 mg by mouth daily as needed for dizziness or nausea, Disp: , Rfl:     metoprolol succinate (TOPROL-XL) 100 mg 24 hr tablet, Take 100 mg by mouth daily, Disp: , Rfl:     metroNIDAZOLE (METROGEL) 0.75 % gel, Apply topically in the morning, Disp: 45 g, Rfl: 1    ondansetron (ZOFRAN-ODT) 4 mg disintegrating tablet, Take 1 tablet (4 mg total) by mouth every 6 (six) hours as needed for nausea, Disp: 10 tablet, Rfl: 0    pantoprazole (PROTONIX) 40 mg tablet, Take 1 tablet by mouth 2 (two) times a day, Disp: , Rfl:     rosuvastatin (CRESTOR) 40 MG tablet, Take 40 mg by mouth daily, Disp: , Rfl:     Spiriva Respimat 2.5 MCG/ACT AERS inhaler, Inhale 2 puffs daily, Disp: , Rfl:     spironolactone (ALDACTONE) 50 mg tablet, Take 50 mg by mouth daily, Disp: , Rfl:     Syringe/Needle, Disp, 27G X 1/2\" 1 ML MISC, As directed with methotrexate use, Disp: , Rfl:     tolterodine (DETROL LA) 4 mg 24 hr capsule, Take 4 mg by mouth daily, Disp: , Rfl:     vitamin A 3 MG (88371 UT) capsule, Take 10,000 Units by mouth daily, Disp: , Rfl:     vitamin E, tocopherol, 400 units " capsule, Take 400 Units by mouth daily, Disp: , Rfl:     Wegovy 1 MG/0.5ML, Inject 1 mg under the skin Once a week, Disp: , Rfl:     Abatacept (Orencia ClickJect) 125 MG/ML SOAJ, Inject 125 mg under the skin, Disp: , Rfl:     DULoxetine (CYMBALTA) 60 mg delayed release capsule, Take 60 mg by mouth daily, Disp: , Rfl:     FeroSul 325 (65 Fe) MG tablet, Take 1 tablet by mouth daily with breakfast (Patient not taking: Reported on 7/1/2023), Disp: , Rfl:     ferrous sulfate 324 (65 Fe) mg, Take 1 tablet (324 mg total) by mouth 2 (two) times a day, Disp: 60 tablet, Rfl: 3    lubiprostone (AMITIZA) 24 mcg capsule, TAKE 1 CAPSULE (24 MCG TOTAL) BY MOUTH 2 (TWO) TIMES A DAY WITH MEALS (Patient not taking: Reported on 12/12/2024), Disp: , Rfl:     metFORMIN (GLUCOPHAGE-XR) 750 mg 24 hr tablet, Take 750 mg by mouth daily with breakfast, Disp: , Rfl:     Methotrexate Sodium (methotrexate, PF,) 250 MG/10ML injection, Inject 25 mg under the skin once a week, Disp: , Rfl:     metoprolol tartrate (LOPRESSOR) 25 mg tablet, Take 25 mg by mouth 2 (two) times a day (Patient not taking: Reported on 10/31/2024), Disp: , Rfl:     Mirabegron ER (Myrbetriq) 50 MG TB24, Take 50 mg by mouth daily, Disp: , Rfl:     norethindrone (Aygestin) 5 mg tablet, Take 1 tablet (5 mg total) by mouth daily, Disp: 30 tablet, Rfl: 1    predniSONE 10 mg tablet, PLEASE TAKE 1 TABLET BY MOUTH DAILY UNTIL YOUR NEXT VISIT (Patient not taking: Reported on 12/12/2024), Disp: , Rfl:     SUMAtriptan (IMITREX) 50 mg tablet, Take 50 mg by mouth (Patient not taking: Reported on 12/12/2024), Disp: , Rfl:   No Known Allergies  Social History     Socioeconomic History    Marital status: /Civil Union     Spouse name: None    Number of children: None    Years of education: None    Highest education level: None   Occupational History    None   Tobacco Use    Smoking status: Never    Smokeless tobacco: Never   Vaping Use    Vaping status: Never Used   Substance and  Sexual Activity    Alcohol use: No    Drug use: No    Sexual activity: Yes     Partners: Male     Birth control/protection: Female Sterilization   Other Topics Concern    None   Social History Narrative    None     Social Drivers of Health     Financial Resource Strain: Low Risk  (12/4/2023)    [facility]    Overall Financial Resource Strain (CARDIA)     Difficulty of Paying Living Expenses: Not hard at all   Food Insecurity: No Food Insecurity (12/4/2023)    [facility]    Hunger Vital Sign     Worried About Running Out of Food in the Last Year: Never true     Ran Out of Food in the Last Year: Never true   Transportation Needs: No Transportation Needs (12/4/2023)    [facility] from [facility]    PRAPARE - Transportation     Lack of Transportation (Medical): No     Lack of Transportation (Non-Medical): No   Physical Activity: Not on file   Stress: Not on file   Social Connections: Unknown (6/18/2024)    Received from [facility]    Social Connections     How often do you feel lonely or isolated from those around you? (Adult - for ages 18 years and over): Not on file   Intimate Partner Violence: Not At Risk (12/4/2023)    [facility] from [facility]    Humiliation, Afraid, Rape, and Kick questionnaire     Fear of Current or Ex-Partner: No     Emotionally Abused: No     Physically Abused: No     Sexually Abused: No   Housing Stability: Low Risk  (12/4/2023)    [facility] from [facility]    Housing Stability Vital Sign     Unable to Pay for Housing in the Last Year: No     Number of Places Lived in the Last Year: 1     Unstable Housing in the Last Year: No     Family History   Problem Relation Age of Onset    Cancer Mother         ovary    Diabetes Mother     Cancer  Maternal Aunt         ovary    Leukemia Maternal Aunt     Heart disease Neg Hx        Review of Systems    Objective   Physical Exam  OBGyn Exam     Objective      /60 (BP Location: Left arm, Patient Position: Sitting)   Wt 87.5 kg (192 lb 12.8 oz)   LMP 11/10/2024   BMI 32.58 kg/m²     General:   alert and oriented, in no acute distress   Neck:    Breast:    Heart:    Lungs:    Abdomen: soft, non-tender, without masses or organomegaly   Vulva: normal   Vagina: Moderate amount of vaginal bleeding, without erythema or lesions.   Cervix: Small amount of blood, without lesions or cervicitis.  No CMT   Uterus: top normal size, anteverted, non-tender   Adnexa: no mass, fullness, tenderness   Rectum: deferred    Psych:  Normal mood and affect   Skin:  Without obvious lesions   Eyes: symmetric, with normal movements and reactivity   Musculoskeletal:  Normal muscle tone and movements appreciated

## 2024-12-12 NOTE — ADDENDUM NOTE
Addended by: CATHY STEPHEN on: 12/12/2024 03:55 PM     Modules accepted: Orders     Neuropathy, idiopathic

## 2024-12-12 NOTE — PROGRESS NOTES
"Endometrial biopsy    Date/Time: 12/12/2024 3:20 PM    Performed by: Andres Evangelista MD  Authorized by: Andres Evangelista MD  Universal Protocol:  procedure performed by consultantConsent: Verbal consent obtained.  Risks and benefits: risks, benefits and alternatives were discussed  Consent given by: patient  Time out: Immediately prior to procedure a \"time out\" was called to verify the correct patient, procedure, equipment, support staff and site/side marked as required.  Timeout called at: 12/12/2024 3:20 PM.  Patient understanding: patient states understanding of the procedure being performed  Patient consent: the patient's understanding of the procedure matches consent given  Procedure consent: procedure consent matches procedure scheduled  Relevant documents: relevant documents present and verified  Patient identity confirmed: verbally with patient    Indication:     Indications: Other disorder of menstruation and other abnormal bleeding from female genital tract    Procedure:     Procedure: endometrial biopsy with Pipelle      A bivalve speculum was placed in the vagina: yes      Cervix cleaned and prepped: yes      The cervix was dilated: no      Uterus sounded: yes      Uterus sound depth (cm):  9    Specimen collected: specimen collected and sent to pathology      Patient tolerated procedure well with no complications: yes    Findings:     Uterus size:  Non-gravid    Cervix: normal      Adnexa: normal    Comments:     Procedure comments:  Speculum placed.  Cervix cleansed with Hibiclens.  Pipelle catheter placed through the os without need for tenaculum.  Blood clot and tissue obtained.  Patient tolerated procedure well.      "

## 2024-12-13 LAB
HPV HR 12 DNA CVX QL NAA+PROBE: NEGATIVE
HPV16 DNA CVX QL NAA+PROBE: NEGATIVE
HPV18 DNA CVX QL NAA+PROBE: NEGATIVE

## 2024-12-16 NOTE — PROGRESS NOTES
AMB US Pelvic Non OB    Date/Time: 12/17/2024 1:30 PM    Performed by: Megan Arauz  Authorized by: Andres Evangelista MD  Universal Protocol:  Consent: Verbal consent obtained.  Consent given by: patient  Timeout called at: 12/17/2024 1:27 PM.  Patient understanding: patient states understanding of the procedure being performed  Patient identity confirmed: verbally with patient    Procedure details:     SIS Procedure: Yes    Indications: non-obstetric vaginal bleeding      Technique:  Transvaginal US, Non-OB    Position: lithotomy exam    Uterine findings:     Length (cm): 9.31    Height (cm):  5.79    Width (cm):  6.78    Endometrial stripe: identified      Endometrium thickness (mm):  3.7  Left ovary findings:     Left ovary:  Visualized    Length (cm): 3.28    Height (cm): 2.23    Width (cm): 2.89  Right ovary findings:     Right ovary:  Visualized    Length (cm): 3.86    Height (cm): 2.19    Width (cm): 2.68  Other findings:     Free pelvic fluid: not identified      Free peritoneal fluid: not identified    Post-Procedure Details:     Impression:  Anteverted uterus is inhomogeneous throughout without fibroids. There are nabothian cysts noted in the cervix. The right ovary demonstrates a 3.1cm cyst with septation and the left ovary demonstrates a 1.8cm follicle. No free fluid.     Tolerance:  Tolerated well, no immediate complications    Complications: no complications    Additional Procedure Comments:      Namshi F8 E8C-RS transvaginal transducer Serial # [id number] was used to perform the examination today and subsequently followed with high level disinfection utilizing Trophon EPR procedure.     Ultrasound performed at:     [facility]  [address] [place] [id number]  Phone:  [phone]  Fax:  [phone]    Sonohysterogram    Date/Time: 12/17/2024 1:30 PM    Performed by: Andres Evangelista MD  Authorized by: Andres Evangelista MD  Universal Protocol:  Consent: Verbal consent obtained.  Consent given  by: patient  Timeout called at: 12/17/2024 1:27 PM.  Patient understanding: patient states understanding of the procedure being performed  Patient identity confirmed: verbally with patient    Procedure:     Cervix cleaned and prepped: yes      Tenaculum applied to cervix: yes      Uterus sounded: yes      Catheter inserted: yes      Uterine cavity distended with saline: yes    Post-procedure:     Patient observed: yes      Post procedure instructions given to patient: yes      Patient tolerated procedure well with no complications: yes    Comments:      Sonohysterogram demonstrates a smooth appearing endometrium.

## 2024-12-17 ENCOUNTER — ULTRASOUND (OUTPATIENT)
Dept: GYNECOLOGY | Facility: CLINIC | Age: 49
End: 2024-12-17
Payer: COMMERCIAL

## 2024-12-17 ENCOUNTER — RESULTS FOLLOW-UP (OUTPATIENT)
Dept: GYNECOLOGY | Facility: CLINIC | Age: 49
End: 2024-12-17

## 2024-12-17 ENCOUNTER — PROCEDURE VISIT (OUTPATIENT)
Dept: GYNECOLOGY | Facility: CLINIC | Age: 49
End: 2024-12-17
Payer: COMMERCIAL

## 2024-12-17 DIAGNOSIS — N83.201 RIGHT OVARIAN CYST: ICD-10-CM

## 2024-12-17 DIAGNOSIS — N92.1 MENOMETRORRHAGIA: Primary | ICD-10-CM

## 2024-12-17 DIAGNOSIS — N93.9 ABNORMAL UTERINE BLEEDING (AUB): Primary | ICD-10-CM

## 2024-12-17 PROCEDURE — 76831 ECHO EXAM UTERUS: CPT | Performed by: OBSTETRICS & GYNECOLOGY

## 2024-12-17 PROCEDURE — 58340 CATHETER FOR HYSTEROGRAPHY: CPT | Performed by: OBSTETRICS & GYNECOLOGY

## 2024-12-17 PROCEDURE — 99213 OFFICE O/P EST LOW 20 MIN: CPT | Performed by: OBSTETRICS & GYNECOLOGY

## 2024-12-17 PROCEDURE — 88305 TISSUE EXAM BY PATHOLOGIST: CPT | Performed by: PATHOLOGY

## 2024-12-17 NOTE — LETTER
December 17, 2024     Myrna Silvestre  [address] [facility] [address]    Patient: Myrna Silvestre   YOB: 1975   Date of Visit: 12/17/2024       To Whom It May Concern:     Myrna Silvestre was seen today in our office for a procedure. She may return to work with no restrictions on 12/18/2024.     Thank you,      Andres Evangelista MD

## 2024-12-17 NOTE — RESULT ENCOUNTER NOTE
Endometrial biopsy pathology is benign, to review with patient at follow-up visit with sonohysterogram later today.

## 2024-12-17 NOTE — PROGRESS NOTES
Assessment & Plan   Diagnoses and all orders for this visit:    Menometrorrhagia    Right ovarian cyst    1. menometrorrhagia-sonohysterogram today was notable for no focal findings.  Endometrial biopsy was done at previous visit on 2024.  Pathology demonstrated disordered proliferative endometrium without any hyperplasia or carcinoma noted.  HPV is negative, Pap is still pending.  Patient was started on Aygestin 5 mg tablets on 2024.  She stopped bleeding right after that.  Plan is for her to continue the Aygestin for 10 days and then stop and then reassess to see how she does.  She will contact me with any issues of menometrorrhagia.  2. right ovarian cyst-was 3.4 cm on previous ultrasound from 2024.  Today, was 3.1 cm.  Exam is without any focal tenderness in this area.  No intervention is recommended.  She will follow-up in 3 months time for ultrasound and visit with me.  3.  History of anemia-last transfusion of iron was from 2024.  She saw hematologist October.  Most recent hemoglobin was 12.0 from 2024.  She is taking iron twice daily, could backed this down to once a day.  Follow-up with hematology as recommended by them.  4.  x 5,  x 2  5.  History of tubal ligation done with last   6.  History of hypothyroidism-TSH was normal from 2024  7.  History of DVT/PE x 2-pulmonary embolus with pregnancy in .  DVT in her calf after a fall at work.  Pulmonary embolus in  with COVID.  She continues on Eliquis since .  8.  History of urinary continence-continues on trospium 20 mg twice daily.  Follow-up 3 months for ultrasound and visit with me or as needed.    Subjective   Patient ID: Myrna Silvestre is a 49 y.o. female.    There were no vitals filed for this visit.  Patient was seen today for follow-up visit.  Please see assessment plan for details.        The following portions of the patient's history were reviewed and updated as appropriate:  allergies, current medications, past family history, past medical history, past social history, past surgical history, and problem list.  Past Medical History:   Diagnosis Date    Arthritis     Disease of thyroid gland     Fibromyalgia     Scoliosis      Past Surgical History:   Procedure Laterality Date     SECTION      CHOLECYSTECTOMY       OB History    Para Term  AB Living   8 7 6 1 1 7   SAB IAB Ectopic Multiple Live Births   1    7      # Outcome Date GA Lbr [place]/2nd Weight Sex Type Anes PTL Lv   8      M CS-Unspec      7 Term     F Vag-Spont      6 Term     F Vag-Spont      5 Term     M Vag-Spont      4 SAB            3 Term     M Vag-Spont      2 Term     F       1 Term     F CS-Unspec          Current Outpatient Medications:     Abatacept (Orencia ClickJect) 125 MG/ML SOAJ, Inject 125 mg under the skin, Disp: , Rfl:     albuterol (PROVENTIL HFA,VENTOLIN HFA) 90 mcg/act inhaler, Inhale 2 puffs every 6 (six) hours as needed for wheezing, Disp: , Rfl:     Ascorbic Acid (vitamin C) 100 MG tablet, Take 100 mg by mouth daily, Disp: , Rfl:     b complex vitamins capsule, Take 1 capsule by mouth in the morning, Disp: , Rfl:     buPROPion (WELLBUTRIN SR) 150 mg 12 hr tablet, Take 150 mg by mouth in the morning, Disp: , Rfl:     busPIRone (BUSPAR) 15 mg tablet, Take 15 mg by mouth 2 (two) times a day, Disp: , Rfl:     dicyclomine (BENTYL) 20 mg tablet, Take 1 tablet (20 mg total) by mouth every 6 (six) hours as needed (diarrhea, abdominal cramps), Disp: 15 tablet, Rfl: 0    DULoxetine (CYMBALTA) 60 mg delayed release capsule, Take 60 mg by mouth daily, Disp: , Rfl:     Eliquis 5 MG, Take 5 mg by mouth 2 (two) times a day, Disp: , Rfl:     Ergocalciferol (VITAMIN D2 PO), Take 50,000 Units by mouth once a week, Disp: , Rfl:     ezetimibe (ZETIA) 10 mg tablet, Take 10 mg by mouth daily, Disp: , Rfl:     FeroSul 325 (65 Fe) MG tablet, Take 1 tablet by mouth daily with breakfast (Patient not  taking: Reported on 7/1/2023), Disp: , Rfl:     ferrous sulfate 324 (65 Fe) mg, Take 1 tablet (324 mg total) by mouth 2 (two) times a day, Disp: 60 tablet, Rfl: 3    FOLIC ACID PO, Take by mouth, Disp: , Rfl:     levothyroxine 100 mcg tablet, Take 100 mcg by mouth daily 100 mcg Monday through Friday 88 Saturday and Sunday, Disp: , Rfl:     Linzess 145 MCG CAPS, Take 145 mcg by mouth daily, Disp: , Rfl:     lubiprostone (AMITIZA) 24 mcg capsule, TAKE 1 CAPSULE (24 MCG TOTAL) BY MOUTH 2 (TWO) TIMES A DAY WITH MEALS (Patient not taking: Reported on 12/12/2024), Disp: , Rfl:     meclizine (ANTIVERT) 12.5 MG tablet, Take 12.5 mg by mouth daily as needed for dizziness or nausea, Disp: , Rfl:     metFORMIN (GLUCOPHAGE-XR) 750 mg 24 hr tablet, Take 750 mg by mouth daily with breakfast, Disp: , Rfl:     Methotrexate Sodium (methotrexate, PF,) 250 MG/10ML injection, Inject 25 mg under the skin once a week, Disp: , Rfl:     metoprolol succinate (TOPROL-XL) 100 mg 24 hr tablet, Take 100 mg by mouth daily, Disp: , Rfl:     metoprolol tartrate (LOPRESSOR) 25 mg tablet, Take 25 mg by mouth 2 (two) times a day (Patient not taking: Reported on 10/31/2024), Disp: , Rfl:     metroNIDAZOLE (METROGEL) 0.75 % gel, Apply topically in the morning, Disp: 45 g, Rfl: 1    Mirabegron ER (Myrbetriq) 50 MG TB24, Take 50 mg by mouth daily, Disp: , Rfl:     norethindrone (Aygestin) 5 mg tablet, Take 1 tablet (5 mg total) by mouth daily, Disp: 30 tablet, Rfl: 1    ondansetron (ZOFRAN-ODT) 4 mg disintegrating tablet, Take 1 tablet (4 mg total) by mouth every 6 (six) hours as needed for nausea, Disp: 10 tablet, Rfl: 0    pantoprazole (PROTONIX) 40 mg tablet, Take 1 tablet by mouth 2 (two) times a day, Disp: , Rfl:     predniSONE 10 mg tablet, PLEASE TAKE 1 TABLET BY MOUTH DAILY UNTIL YOUR NEXT VISIT (Patient not taking: Reported on 12/12/2024), Disp: , Rfl:     rosuvastatin (CRESTOR) 40 MG tablet, Take 40 mg by mouth daily, Disp: , Rfl:     Spiriva  "Respimat 2.5 MCG/ACT AERS inhaler, Inhale 2 puffs daily, Disp: , Rfl:     spironolactone (ALDACTONE) 50 mg tablet, Take 50 mg by mouth daily, Disp: , Rfl:     SUMAtriptan (IMITREX) 50 mg tablet, Take 50 mg by mouth (Patient not taking: Reported on 12/12/2024), Disp: , Rfl:     Syringe/Needle, Disp, 27G X 1/2\" 1 ML MISC, As directed with methotrexate use, Disp: , Rfl:     tolterodine (DETROL LA) 4 mg 24 hr capsule, Take 4 mg by mouth daily, Disp: , Rfl:     vitamin A 3 MG (98817 UT) capsule, Take 10,000 Units by mouth daily, Disp: , Rfl:     vitamin E, tocopherol, 400 units capsule, Take 400 Units by mouth daily, Disp: , Rfl:     Wegovy 1 MG/0.5ML, Inject 1 mg under the skin Once a week, Disp: , Rfl:   No Known Allergies  Social History     Socioeconomic History    Marital status: /Civil Union     Spouse name: Not on file    Number of children: Not on file    Years of education: Not on file    Highest education level: Not on file   Occupational History    Not on file   Tobacco Use    Smoking status: Never    Smokeless tobacco: Never   Vaping Use    Vaping status: Never Used   Substance and Sexual Activity    Alcohol use: No    Drug use: No    Sexual activity: Yes     Partners: Male     Birth control/protection: Female Sterilization   Other Topics Concern    Not on file   Social History Narrative    Not on file     Social Drivers of Health     Financial Resource Strain: Low Risk  (12/4/2023)    Received from [facility], [facility]    Overall Financial Resource Strain (CARDIA)     Difficulty of Paying Living Expenses: Not hard at all   Food Insecurity: No Food Insecurity (12/4/2023)    Received from [facility], [facility]    Hunger Vital Sign     Worried About Running Out of Food in the Last Year: Never true     Ran Out of Food in the Last Year: Never true   Transportation Needs: No Transportation Needs (12/4/2023)    Received from [facility] [facility], [facility]    PRAPARE - Transportation     Lack of Transportation (Medical): No     Lack of Transportation (Non-Medical): No   Physical Activity: Not on file   Stress: Not on file   Social Connections: Unknown (6/18/2024)    Received from [facility]     How often do you feel lonely or isolated from those around you? (Adult - for ages 18 years and over): Not on file   Intimate Partner Violence: Not At Risk (12/4/2023)    Received from [facility], [facility]    Humiliation, Afraid, Rape, and Kick questionnaire     Fear of Current or Ex-Partner: No     Emotionally Abused: No     Physically Abused: No     Sexually Abused: No   Housing Stability: Low Risk  (12/4/2023)    Received from [facility], [facility]    Housing Stability Vital Sign     Unable to Pay for Housing in the Last Year: No     Number of Places Lived in the Last Year: 1     Unstable Housing in the Last Year: No     Family History   Problem Relation Age of Onset    Cancer Mother         ovary    Diabetes Mother     Cancer Maternal Aunt         ovary    Leukemia Maternal Aunt     Heart disease Neg Hx        Review of Systems   Constitutional:  Negative for chills, diaphoresis, fatigue and fever.   Respiratory:  Negative for apnea, cough, chest tightness, shortness of breath and wheezing.    Cardiovascular:  Negative for chest pain, palpitations and leg swelling.   Gastrointestinal:  Negative for abdominal distention, abdominal pain, anal bleeding, constipation, diarrhea, nausea, rectal pain and vomiting.   Genitourinary:  Positive for menstrual problem. Negative for difficulty urinating, dyspareunia, dysuria, frequency, hematuria, pelvic pain, urgency, vaginal bleeding, vaginal discharge and vaginal pain.   Musculoskeletal:  Negative for arthralgias, back pain and myalgias.   Skin:  Negative for color change and rash.    Neurological:  Negative for dizziness, syncope, light-headedness, numbness and headaches.   Hematological:  Negative for adenopathy. Does not bruise/bleed easily.   Psychiatric/Behavioral:  Negative for dysphoric mood and sleep disturbance. The patient is not nervous/anxious.        Objective   Physical Exam  OBGyn Exam     Objective      LMP 11/10/2024     General:   alert and oriented, in no acute distress   Neck:    Breast:    Heart:    Lungs:    Abdomen: soft, non-tender, without masses or organomegaly   Vulva: normal   Vagina: Without erythema or lesions or discharge.  Normal   Cervix: Without lesions or discharge or cervicitis.  No Cervical motion tenderness   Uterus: top normal size, anteverted, non-tender   Adnexa: no mass, fullness, tenderness   Rectum: deferred    Psych:  Normal mood and affect   Skin:  Without obvious lesions   Eyes: symmetric, with normal movements and reactivity   Musculoskeletal:  Normal muscle tone and movements appreciated

## 2024-12-19 LAB
LAB AP GYN PRIMARY INTERPRETATION: NORMAL
LAB AP LMP: NORMAL
Lab: NORMAL

## 2025-03-06 ENCOUNTER — TELEPHONE (OUTPATIENT)
Age: 50
End: 2025-03-06

## 2025-03-06 NOTE — TELEPHONE ENCOUNTER
patient is currently sick and needs to reschedule ultrasound and office visit appointments with Dr. Evangelista. Contact patient to assist with scheduling.

## 2025-03-06 NOTE — TELEPHONE ENCOUNTER
Called patient back as requested, however she did not . LM for her to call office at [phone] to reschedule office ultrasound and follow up with Dr. Evangelista. She was advised to ask to be transferred directly to office for scheduling.

## 2025-04-03 ENCOUNTER — OFFICE VISIT (OUTPATIENT)
Age: 50
End: 2025-04-03
Payer: COMMERCIAL

## 2025-04-03 VITALS
HEIGHT: 67 IN | BODY MASS INDEX: 27.62 KG/M2 | WEIGHT: 176 LBS | DIASTOLIC BLOOD PRESSURE: 68 MMHG | TEMPERATURE: 98.3 F | OXYGEN SATURATION: 98 % | HEART RATE: 68 BPM | SYSTOLIC BLOOD PRESSURE: 110 MMHG

## 2025-04-03 DIAGNOSIS — L71.9 ROSACEA: ICD-10-CM

## 2025-04-03 DIAGNOSIS — R00.2 PALPITATIONS: ICD-10-CM

## 2025-04-03 DIAGNOSIS — E78.49 OTHER HYPERLIPIDEMIA: ICD-10-CM

## 2025-04-03 DIAGNOSIS — G89.29 CHRONIC MIDLINE LOW BACK PAIN WITHOUT SCIATICA: ICD-10-CM

## 2025-04-03 DIAGNOSIS — M54.50 CHRONIC MIDLINE LOW BACK PAIN WITHOUT SCIATICA: ICD-10-CM

## 2025-04-03 DIAGNOSIS — M41.85 OTHER FORM OF SCOLIOSIS OF THORACOLUMBAR SPINE: ICD-10-CM

## 2025-04-03 DIAGNOSIS — Z00.00 ANNUAL PHYSICAL EXAM: Primary | ICD-10-CM

## 2025-04-03 PROCEDURE — 99396 PREV VISIT EST AGE 40-64: CPT | Performed by: INTERNAL MEDICINE

## 2025-04-03 NOTE — PATIENT INSTRUCTIONS
"Patient Education     Routine physical for adults   The Basics   Written by the doctors and editors at [facility]   What is a physical? -- A physical is a routine visit, or \"check-up,\" with your doctor. You might also hear it called a \"wellness visit\" or \"preventive visit.\"  During each visit, the doctor will:   Ask about your physical and mental health   Ask about your habits, behaviors, and lifestyle   Do an exam   Give you vaccines if needed   Talk to you about any medicines you take   Give advice about your health   Answer your questions  Getting regular check-ups is an important part of taking care of your health. It can help your doctor find and treat any problems you have. But it's also important for preventing health problems.  A routine physical is different from a \"sick visit.\" A sick visit is when you see a doctor because of a health concern or problem. Since physicals are scheduled ahead of time, you can think about what you want to ask the doctor.  How often should I get a physical? -- It depends on your age and health. In general, for people age 21 years and older:   If you are younger than 50 years, you might be able to get a physical every 3 years.   If you are 50 years or older, your doctor might recommend a physical every year.  If you have an ongoing health condition, like diabetes or high blood pressure, your doctor will probably want to see you more often.  What happens during a physical? -- In general, each visit will include:   Physical exam - The doctor or nurse will check your height, weight, heart rate, and blood pressure. They will also look at your eyes and ears. They will ask about how you are feeling and whether you have any symptoms that bother you.   Medicines - It's a good idea to bring a list of all the medicines you take to each doctor visit. Your doctor will talk to you about your medicines and answer any questions. Tell them if you are having any side effects that bother you. You " "should also tell them if you are having trouble paying for any of your medicines.   Habits and behaviors - This includes:   Your diet   Your exercise habits   Whether you smoke, drink alcohol, or use drugs   Whether you are sexually active   Whether you feel safe at home  Your doctor will talk to you about things you can do to improve your health and lower your risk of health problems. They will also offer help and support. For example, if you want to quit smoking, they can give you advice and might prescribe medicines. If you want to improve your diet or get more physical activity, they can help you with this, too.   Lab tests, if needed - The tests you get will depend on your age and situation. For example, your doctor might want to check your:   Cholesterol   Blood sugar   Iron level   Vaccines - The recommended vaccines will depend on your age, health, and what vaccines you already had. Vaccines are very important because they can prevent certain serious or deadly infections.   Discussion of screening - \"Screening\" means checking for diseases or other health problems before they cause symptoms. Your doctor can recommend screening based on your age, risk, and preferences. This might include tests to check for:   Cancer, such as breast, prostate, cervical, ovarian, colorectal, prostate, lung, or skin cancer   Sexually transmitted infections, such as chlamydia and gonorrhea   Mental health conditions like depression and anxiety  Your doctor will talk to you about the different types of screening tests. They can help you decide which screenings to have. They can also explain what the results might mean.   Answering questions - The physical is a good time to ask the doctor or nurse questions about your health. If needed, they can refer you to other doctors or specialists, too.  Adults older than 65 years often need other care, too. As you get older, your doctor will talk to you about:   How to prevent falling at " home   Hearing or vision tests   Memory testing   How to take your medicines safely   Making sure that you have the help and support you need at home  All topics are updated as new evidence becomes available and our peer review process is complete.  This topic retrieved from [employer] on: May 02, 2024.  Topic 564032 Version 1.0  Release: 32.4.3 - C32.122  © 2024 UpToDate, Inc. and/or its affiliates. All rights reserved.  Consumer Information Use and Disclaimer   Disclaimer: This generalized information is a limited summary of diagnosis, treatment, and/or medication information. It is not meant to be comprehensive and should be used as a tool to help the user understand and/or assess potential diagnostic and treatment options. It does NOT include all information about conditions, treatments, medications, side effects, or risks that may apply to a specific patient. It is not intended to be medical advice or a substitute for the medical advice, diagnosis, or treatment of a health care provider based on the health care provider's examination and assessment of a patient's specific and unique circumstances. Patients must speak with a health care provider for complete information about their health, medical questions, and treatment options, including any risks or benefits regarding use of medications. This information does not endorse any treatments or medications as safe, effective, or approved for treating a specific patient. Up[employer]Date, Inc. and its affiliates disclaim any warranty or liability relating to this information or the use thereof.The use of this information is governed by the Terms of Use, available at https://www.woltersNeoconixuwer.com/en/know/clinical-effectiveness-terms. 2024© UpToDate, Inc. and its affiliates and/or licensors. All rights reserved.  Copyright   © 2024 Up[employer]Date, Inc. and/or its affiliates. All rights reserved.

## 2025-04-03 NOTE — PROGRESS NOTES
Adult Annual Physical  Name: Myrna Silvestre      : 1975      MRN: [id number]  Encounter Provider: Addis Patiño MD  Encounter Date: 4/3/2025   Encounter department: [facility] PRIMARY CARE    :  Assessment & Plan  Annual physical exam         Rosacea    Orders:    Ambulatory Referral to Dermatology; Future    Palpitations    Orders:    VAS carotid complete study; Future    Other hyperlipidemia    Orders:    VAS carotid complete study; Future    Other form of scoliosis of thoracolumbar spine    Orders:    Ambulatory Referral to Orthopedic Surgery; Future    Chronic midline low back pain without sciatica    Orders:    Ambulatory Referral to Orthopedic Surgery; Future    Rosacea         Palpitations         Other hyperlipidemia         Other form of scoliosis of thoracolumbar spine         Chronic midline low back pain without sciatica         Annual physical exam               Preventive Screenings:    - Cervical cancer screening: screening up-to-date   - Breast cancer screening: screening up-to-date     Immunizations:  - Immunizations due: Influenza and Prevnar 20     Patient is here for annual physical.  She would following with a gynecologist.  Recent pelvic ultrasound was noted.  She has not had any heavy menstrual cycle.  She does not overcorrect and has been having palpitation.  She was advised to continue with metoprolol.  Her blood pressure is good however she has been having episodes of dizziness.  Sometimes she feels her neck veins are congested.  She had lab studies was noted to have dyslipidemia.  I will suggest getting carotid Dopplers.  She is under care of endocrinology for hypothyroidism.   She is up-to-date with vaccination.  History of Present Illness     Adult Annual Physical  Review of Systems  Past Medical History   Past Medical History:   Diagnosis Date    Arthritis     Disease of thyroid gland     Fibromyalgia     Scoliosis      Past Surgical History:   Procedure Laterality Date      SECTION      CHOLECYSTECTOMY       Family History   Problem Relation Age of Onset    Cancer Mother         ovary    Diabetes Mother     Cancer Maternal Aunt         ovary    Leukemia Maternal Aunt     Heart disease Neg Hx       reports that she has never smoked. She has never used smokeless tobacco. She reports that she does not drink alcohol and does not use drugs.  Current Outpatient Medications   Medication Instructions    albuterol (PROVENTIL HFA,VENTOLIN HFA) 90 mcg/act inhaler 2 puffs, Every 6 hours PRN    b complex vitamins capsule 1 capsule, Daily    buPROPion (WELLBUTRIN SR) 150 mg, Daily    busPIRone (BUSPAR) 15 mg, 2 times daily    dicyclomine (BENTYL) 20 mg, Oral, Every 6 hours PRN    DULoxetine (CYMBALTA) 60 mg, Daily    Eliquis 5 mg, 2 times daily    Ergocalciferol (VITAMIN D2 PO) 50,000 Units, Weekly    ezetimibe (ZETIA) 10 mg, Daily    FeroSul 325 (65 Fe) MG tablet 1 tablet, Daily with breakfast    ferrous sulfate 324 mg, Oral, 2 times daily    FOLIC ACID PO Take by mouth    levothyroxine 100 mcg, Daily    Linzess 145 mcg, Daily    lubiprostone (AMITIZA) 24 mcg capsule TAKE 1 CAPSULE (24 MCG TOTAL) BY MOUTH 2 (TWO) TIMES A DAY WITH MEALS    meclizine (ANTIVERT) 12.5 mg, Daily PRN    metFORMIN (GLUCOPHAGE-XR) 750 mg, Daily with breakfast    methotrexate (PF) 25 mg, Weekly    metoprolol succinate (TOPROL-XL) 100 mg, Daily    metoprolol tartrate (LOPRESSOR) 25 mg, 2 times daily    metroNIDAZOLE (METROGEL) 0.75 % gel Topical, Daily    Myrbetriq 50 mg, Oral, Daily    norethindrone (AYGESTIN) 5 mg, Oral, Daily    ondansetron (ZOFRAN-ODT) 4 mg, Oral, Every 6 hours PRN    Orencia ClickJect 125 mg, Subcutaneous    pantoprazole (PROTONIX) 40 mg tablet 1 tablet, 2 times daily    predniSONE 10 mg tablet PLEASE TAKE 1 TABLET BY MOUTH DAILY UNTIL YOUR NEXT VISIT    rosuvastatin (CRESTOR) 40 mg, Daily    Spiriva Respimat 2.5 MCG/ACT AERS inhaler 2 puffs, Daily    spironolactone (ALDACTONE) 50 mg,  "Daily    SUMAtriptan (IMITREX) 50 mg    Syringe/Needle, Disp, 27G X 1/2\" 1 ML MISC As directed with methotrexate use    tolterodine (DETROL LA) 4 mg, Daily    vitamin A 10,000 Units, Daily    vitamin C 100 mg, Daily    vitamin E (tocopherol) 400 Units, Daily    Wegovy 1 mg, Weekly   No Known Allergies   Current Outpatient Medications on File Prior to Visit   Medication Sig Dispense Refill    albuterol (PROVENTIL HFA,VENTOLIN HFA) 90 mcg/act inhaler Inhale 2 puffs every 6 (six) hours as needed for wheezing      Ascorbic Acid (vitamin C) 100 MG tablet Take 100 mg by mouth daily      b complex vitamins capsule Take 1 capsule by mouth in the morning      buPROPion (WELLBUTRIN SR) 150 mg 12 hr tablet Take 150 mg by mouth in the morning      dicyclomine (BENTYL) 20 mg tablet Take 1 tablet (20 mg total) by mouth every 6 (six) hours as needed (diarrhea, abdominal cramps) 15 tablet 0    Eliquis 5 MG Take 5 mg by mouth 2 (two) times a day      Ergocalciferol (VITAMIN D2 PO) Take 50,000 Units by mouth once a week      ezetimibe (ZETIA) 10 mg tablet Take 10 mg by mouth daily      ferrous sulfate 324 (65 Fe) mg Take 1 tablet (324 mg total) by mouth 2 (two) times a day 60 tablet 3    FOLIC ACID PO Take by mouth      levothyroxine 100 mcg tablet Take 100 mcg by mouth daily 100 mcg Monday through Friday 88 Saturday and Sunday      Linzess 145 MCG CAPS Take 145 mcg by mouth daily      meclizine (ANTIVERT) 12.5 MG tablet Take 12.5 mg by mouth daily as needed for dizziness or nausea      metFORMIN (GLUCOPHAGE-XR) 750 mg 24 hr tablet Take 750 mg by mouth daily with breakfast      Methotrexate Sodium (methotrexate, PF,) 250 MG/10ML injection Inject 25 mg under the skin once a week      metoprolol succinate (TOPROL-XL) 100 mg 24 hr tablet Take 100 mg by mouth daily      metroNIDAZOLE (METROGEL) 0.75 % gel Apply topically in the morning 45 g 1    norethindrone (Aygestin) 5 mg tablet Take 1 tablet (5 mg total) by mouth daily 30 tablet 1    " "ondansetron (ZOFRAN-ODT) 4 mg disintegrating tablet Take 1 tablet (4 mg total) by mouth every 6 (six) hours as needed for nausea 10 tablet 0    pantoprazole (PROTONIX) 40 mg tablet Take 1 tablet by mouth 2 (two) times a day      rosuvastatin (CRESTOR) 40 MG tablet Take 40 mg by mouth daily      Spiriva Respimat 2.5 MCG/ACT AERS inhaler Inhale 2 puffs daily      spironolactone (ALDACTONE) 50 mg tablet Take 50 mg by mouth daily      Syringe/Needle, Disp, 27G X 1/2\" 1 ML MISC As directed with methotrexate use      tolterodine (DETROL LA) 4 mg 24 hr capsule Take 4 mg by mouth daily      vitamin A 3 MG (12912 UT) capsule Take 10,000 Units by mouth daily      vitamin E, tocopherol, 400 units capsule Take 400 Units by mouth daily      Wegovy 1 MG/0.5ML Inject 1 mg under the skin Once a week      Abatacept (Orencia ClickJect) 125 MG/ML SOAJ Inject 125 mg under the skin      busPIRone (BUSPAR) 15 mg tablet Take 15 mg by mouth 2 (two) times a day (Patient not taking: Reported on 4/3/2025)      DULoxetine (CYMBALTA) 60 mg delayed release capsule Take 60 mg by mouth daily (Patient not taking: Reported on 4/3/2025)      FeroSul 325 (65 Fe) MG tablet Take 1 tablet by mouth daily with breakfast (Patient not taking: Reported on 7/1/2023)      lubiprostone (AMITIZA) 24 mcg capsule TAKE 1 CAPSULE (24 MCG TOTAL) BY MOUTH 2 (TWO) TIMES A DAY WITH MEALS (Patient not taking: Reported on 10/31/2024)      metoprolol tartrate (LOPRESSOR) 25 mg tablet Take 25 mg by mouth 2 (two) times a day (Patient not taking: Reported on 10/31/2024)      Mirabegron ER (Myrbetriq) 50 MG TB24 Take 50 mg by mouth daily      predniSONE 10 mg tablet PLEASE TAKE 1 TABLET BY MOUTH DAILY UNTIL YOUR NEXT VISIT (Patient not taking: No sig reported)      SUMAtriptan (IMITREX) 50 mg tablet Take 50 mg by mouth (Patient not taking: Reported on 6/14/2022)       No current facility-administered medications on file prior to visit.      Social History     Tobacco Use    " "Smoking status: Never    Smokeless tobacco: Never   Vaping Use    Vaping status: Never Used   Substance and Sexual Activity    Alcohol use: No    Drug use: No    Sexual activity: Yes     Partners: Male     Birth control/protection: Female Sterilization       Objective   /68 (BP Location: Left arm, Patient Position: Sitting, Cuff Size: Large)   Pulse 68   Temp 98.3 °F (36.8 °C) (Temporal)   Ht 5' 7\" (1.702 m)   Wt 79.8 kg (176 lb)   SpO2 98%   BMI 27.57 kg/m²     Physical Exam  Eyes:      Conjunctiva/sclera: Conjunctivae normal.   Neck:      Vascular: No carotid bruit.   Cardiovascular:      Rate and Rhythm: Normal rate and regular rhythm.      Heart sounds: Normal heart sounds. No murmur heard.  Pulmonary:      Effort: No respiratory distress.      Breath sounds: Normal breath sounds. No wheezing or rales.   Abdominal:      General: Bowel sounds are normal. There is no distension.      Tenderness: There is no abdominal tenderness. There is no guarding.   Musculoskeletal:      Right lower leg: No edema.      Left lower leg: No edema.   Lymphadenopathy:      Cervical: No cervical adenopathy.   Skin:     Coloration: Skin is not pale.      Findings: Rash (Rosacea) present.   Neurological:      Mental Status: She is alert.   Psychiatric:         Mood and Affect: Mood normal.         Behavior: Behavior normal.         "

## 2025-04-09 NOTE — PROGRESS NOTES
AMB US Pelvic Non OB    Date/Time: 4/10/2025 11:30 AM    Performed by: Megan Arauz  Authorized by: Andres Evangelista MD  Universal Protocol:  Consent: Verbal consent obtained.  Consent given by: patient  Timeout called at: 4/10/2025 11:37 AM.  Patient understanding: patient states understanding of the procedure being performed  Patient identity confirmed: verbally with patient    Procedure details:     SIS Procedure: No    Indications: ovarian cysts      Technique:  Transvaginal US, Non-OB    Position: lithotomy exam    Uterine findings:     Length (cm): 10.81    Height (cm):  5.3    Width (cm):  7.12    Endometrial stripe: identified      Endometrium thickness (mm):  13.7  Left ovary findings:     Left ovary:  Visualized    Length (cm): 2.99    Height (cm): 1.5    Width (cm): 1.69  Right ovary findings:     Right ovary:  Visualized    Length (cm): 3.83    Height (cm): 2.47    Width (cm): 2.32  Other findings:     Free pelvic fluid: not identified      Free peritoneal fluid: not identified    Post-Procedure Details:     Impression:  Anteverted uterus demonstrates a thickened endometrium, otherwise the uterus and bilateral ovaries appear within normal limits. There are nabothian cysts in the cervix. The previously noted 3.1cm right ovarian cyst now measures 1.8cm. this may be a follicle or the previously noted cyst resolving. No septation is noted. No free fluid.     Tolerance:  Tolerated well, no immediate complications    Complications: no complications    Additional Procedure Comments:      Brightblue F8 E8C-RS transvaginal transducer Serial # [id number] was used to perform the examination today and subsequently followed with high level disinfection utilizing Trophon EPR procedure.     Ultrasound performed at:     [facility]  [address] [place] [address]  Phone:  [phone]  Fax:  [phone]

## 2025-04-10 ENCOUNTER — ULTRASOUND (OUTPATIENT)
Dept: GYNECOLOGY | Facility: CLINIC | Age: 50
End: 2025-04-10
Payer: COMMERCIAL

## 2025-04-10 ENCOUNTER — OFFICE VISIT (OUTPATIENT)
Dept: GYNECOLOGY | Facility: CLINIC | Age: 50
End: 2025-04-10
Payer: COMMERCIAL

## 2025-04-10 VITALS — WEIGHT: 174 LBS | DIASTOLIC BLOOD PRESSURE: 64 MMHG | BODY MASS INDEX: 27.25 KG/M2 | SYSTOLIC BLOOD PRESSURE: 110 MMHG

## 2025-04-10 DIAGNOSIS — N92.1 MENOMETRORRHAGIA: ICD-10-CM

## 2025-04-10 DIAGNOSIS — D50.9 IRON DEFICIENCY ANEMIA, UNSPECIFIED IRON DEFICIENCY ANEMIA TYPE: ICD-10-CM

## 2025-04-10 DIAGNOSIS — N83.201 RIGHT OVARIAN CYST: Primary | ICD-10-CM

## 2025-04-10 DIAGNOSIS — R92.30 DENSE BREASTS: ICD-10-CM

## 2025-04-10 PROCEDURE — 99214 OFFICE O/P EST MOD 30 MIN: CPT | Performed by: OBSTETRICS & GYNECOLOGY

## 2025-04-10 PROCEDURE — 76830 TRANSVAGINAL US NON-OB: CPT | Performed by: OBSTETRICS & GYNECOLOGY

## 2025-04-10 RX ORDER — FERROUS SULFATE 324(65)MG
324 TABLET, DELAYED RELEASE (ENTERIC COATED) ORAL 2 TIMES DAILY
Qty: 60 TABLET | Refills: 8 | Status: SHIPPED | OUTPATIENT
Start: 2025-04-10

## 2025-04-10 RX ORDER — FOLIC ACID 1 MG/1
1000 TABLET ORAL DAILY
Qty: 90 TABLET | Refills: 1 | Status: SHIPPED | OUTPATIENT
Start: 2025-04-10

## 2025-04-10 RX ORDER — FOLIC ACID 1 MG/1
1 TABLET ORAL DAILY
Qty: 30 TABLET | Refills: 8 | Status: SHIPPED | OUTPATIENT
Start: 2025-04-10 | End: 2025-04-10

## 2025-04-10 NOTE — PROGRESS NOTES
Assessment & Plan   Diagnoses and all orders for this visit:    Right ovarian cyst    Menometrorrhagia  -     ferrous sulfate 324 (65 Fe) mg; Take 1 tablet (324 mg total) by mouth 2 (two) times a day    Dense breasts    Iron deficiency anemia, unspecified iron deficiency anemia type  -     folic acid (FOLVITE) 1 mg tablet; Take 1 tablet (1 mg total) by mouth daily    1. menometrorrhagia-had follow-up 2024 with sonohysterogram with no focal findings.  Endometrial biopsy was with disordered proliferative endometrium without hyperplasia or atypia seen.  At the time, was treated with Aygestin 5 mg for 10 days.  She then had subsequent menses in January and had normal menses and 2025, around 2/15 through .  It was somewhat heavy and she had to change her pad frequently.  She did not have menses in March.  Plan is for expectant management at this time.  To call or return with menometrorrhagia.  2.  Right ovarian cyst-previously was 3.4 then 3.1.  Today, no definite cyst was noted with 1.8 cm follicle appreciated.  This is consistent with resolution of the cyst.  Patient denies any pelvic pain or pressure.  She will call or return with any such symptoms.  3.  History of anemia-has had iron transfusions in the past.  Most recent hemoglobin was 11.1.  She continues to follow-up with hematology.  She is taking iron twice daily along with folic acid.  Prescription for each were renewed.  4.  x 2 with tubal ligation,  x 5 prior to that  5. history of tubal ligation-with last   6. history of hypothyroidism-TSH was normal 2024  7.  History of DVT/PE x 2-pulmonary embolism with pregnancy in .  Had DVT in her calf after a fall at work.  Pulmonary embolism  with COVID.  Has been on Eliquis since 2017.  Hematologist did lower the dose to 2.5 daily given her heavy menstrual flow with improved bleeding.  She will continue to follow-up with them as recommended.  8.  Urinary  incontinence-trospium 20 mg twice daily.  Follow-up 2025 for yearly exam or as needed.      Subjective   Patient ID: Myrna Silvestre is a 49 y.o. female.    Vitals:    04/10/25 1149   BP: 110/64     Patient was seen today for follow-up visit.  Please see assessment plan for details.        The following portions of the patient's history were reviewed and updated as appropriate: allergies, current medications, past family history, past medical history, past social history, past surgical history, and problem list.  Past Medical History:   Diagnosis Date    Arthritis     Disease of thyroid gland     Fibromyalgia     Scoliosis      Past Surgical History:   Procedure Laterality Date     SECTION      CHOLECYSTECTOMY       OB History    Para Term  AB Living   15 14 13 1 1 7   SAB IAB Ectopic Multiple Live Births   1    7      # Outcome Date GA Lbr [place]/2nd Weight Sex Type Anes PTL Lv   15 Term      CS-LTranv   SUSHMA   14 Term      Vag-Spont   SUSHMA   13 Term      Vag-Spont   SUSHMA   12 Term      Vag-Spont   SUSHMA   11 Term      Vag-Spont   SUSHMA   10 Term      Vag-Spont   SUSHMA   9 Term      CS-LTranv   SUSHMA   8      M CS-Unspec      7 Term     F Vag-Spont      6 Term     F Vag-Spont      5 Term     M Vag-Spont      4 SAB            3 Term     M Vag-Spont      2 Term     F       1 Term     F CS-Unspec          Current Outpatient Medications:     albuterol (PROVENTIL HFA,VENTOLIN HFA) 90 mcg/act inhaler, Inhale 2 puffs every 6 (six) hours as needed for wheezing, Disp: , Rfl:     Ascorbic Acid (vitamin C) 100 MG tablet, Take 100 mg by mouth daily, Disp: , Rfl:     b complex vitamins capsule, Take 1 capsule by mouth in the morning, Disp: , Rfl:     buPROPion (WELLBUTRIN SR) 150 mg 12 hr tablet, Take 150 mg by mouth in the morning, Disp: , Rfl:     dicyclomine (BENTYL) 20 mg tablet, Take 1 tablet (20 mg total) by mouth every 6 (six) hours as needed (diarrhea, abdominal cramps), Disp: 15 tablet, Rfl:  "0    Eliquis 5 MG, Take 5 mg by mouth 2 (two) times a day, Disp: , Rfl:     Ergocalciferol (VITAMIN D2 PO), Take 50,000 Units by mouth once a week, Disp: , Rfl:     ezetimibe (ZETIA) 10 mg tablet, Take 10 mg by mouth daily, Disp: , Rfl:     ferrous sulfate 324 (65 Fe) mg, Take 1 tablet (324 mg total) by mouth 2 (two) times a day, Disp: 60 tablet, Rfl: 8    folic acid (FOLVITE) 1 mg tablet, Take 1 tablet (1 mg total) by mouth daily, Disp: 30 tablet, Rfl: 8    levothyroxine 100 mcg tablet, Take 100 mcg by mouth daily 100 mcg Monday through Friday 88 Saturday and Sunday, Disp: , Rfl:     Linzess 145 MCG CAPS, Take 145 mcg by mouth daily, Disp: , Rfl:     meclizine (ANTIVERT) 12.5 MG tablet, Take 12.5 mg by mouth daily as needed for dizziness or nausea, Disp: , Rfl:     metoprolol succinate (TOPROL-XL) 100 mg 24 hr tablet, Take 100 mg by mouth daily, Disp: , Rfl:     metroNIDAZOLE (METROGEL) 0.75 % gel, Apply topically in the morning, Disp: 45 g, Rfl: 1    norethindrone (Aygestin) 5 mg tablet, Take 1 tablet (5 mg total) by mouth daily, Disp: 30 tablet, Rfl: 1    ondansetron (ZOFRAN-ODT) 4 mg disintegrating tablet, Take 1 tablet (4 mg total) by mouth every 6 (six) hours as needed for nausea, Disp: 10 tablet, Rfl: 0    pantoprazole (PROTONIX) 40 mg tablet, Take 1 tablet by mouth 2 (two) times a day, Disp: , Rfl:     rosuvastatin (CRESTOR) 40 MG tablet, Take 40 mg by mouth daily, Disp: , Rfl:     Spiriva Respimat 2.5 MCG/ACT AERS inhaler, Inhale 2 puffs daily, Disp: , Rfl:     spironolactone (ALDACTONE) 50 mg tablet, Take 50 mg by mouth daily, Disp: , Rfl:     Syringe/Needle, Disp, 27G X 1/2\" 1 ML MISC, As directed with methotrexate use, Disp: , Rfl:     tolterodine (DETROL LA) 4 mg 24 hr capsule, Take 4 mg by mouth daily, Disp: , Rfl:     vitamin A 3 MG (41623 UT) capsule, Take 10,000 Units by mouth daily, Disp: , Rfl:     vitamin E, tocopherol, 400 units capsule, Take 400 Units by mouth daily, Disp: , Rfl:     Wegovy 1 " MG/0.5ML, Inject 1 mg under the skin Once a week, Disp: , Rfl:     Abatacept (Orencia ClickJect) 125 MG/ML SOAJ, Inject 125 mg under the skin, Disp: , Rfl:     busPIRone (BUSPAR) 15 mg tablet, Take 15 mg by mouth 2 (two) times a day (Patient not taking: Reported on 4/10/2025), Disp: , Rfl:     DULoxetine (CYMBALTA) 60 mg delayed release capsule, Take 60 mg by mouth daily (Patient not taking: Reported on 4/3/2025), Disp: , Rfl:     FeroSul 325 (65 Fe) MG tablet, Take 1 tablet by mouth daily with breakfast (Patient not taking: Reported on 7/1/2023), Disp: , Rfl:     lubiprostone (AMITIZA) 24 mcg capsule, TAKE 1 CAPSULE (24 MCG TOTAL) BY MOUTH 2 (TWO) TIMES A DAY WITH MEALS (Patient not taking: Reported on 4/10/2025), Disp: , Rfl:     metFORMIN (GLUCOPHAGE-XR) 750 mg 24 hr tablet, Take 750 mg by mouth daily with breakfast, Disp: , Rfl:     Methotrexate Sodium (methotrexate, PF,) 250 MG/10ML injection, Inject 25 mg under the skin once a week, Disp: , Rfl:     metoprolol tartrate (LOPRESSOR) 25 mg tablet, Take 25 mg by mouth 2 (two) times a day (Patient not taking: Reported on 10/31/2024), Disp: , Rfl:     Mirabegron ER (Myrbetriq) 50 MG TB24, Take 50 mg by mouth daily, Disp: , Rfl:     predniSONE 10 mg tablet, PLEASE TAKE 1 TABLET BY MOUTH DAILY UNTIL YOUR NEXT VISIT (Patient not taking: Reported on 4/10/2025), Disp: , Rfl:     SUMAtriptan (IMITREX) 50 mg tablet, Take 50 mg by mouth (Patient not taking: Reported on 4/10/2025), Disp: , Rfl:   No Known Allergies  Social History     Socioeconomic History    Marital status: /Civil Union     Spouse name: None    Number of children: None    Years of education: None    Highest education level: None   Occupational History    None   Tobacco Use    Smoking status: Never    Smokeless tobacco: Never   Vaping Use    Vaping status: Never Used   Substance and Sexual Activity    Alcohol use: No    Drug use: No    Sexual activity: Yes     Partners: Male     Birth  control/protection: Female Sterilization   Other Topics Concern    None   Social History Narrative    None     Social Drivers of Health     Financial Resource Strain: Low Risk  (12/4/2023)    [facility]    Overall Financial Resource Strain (CARDIA)     Difficulty of Paying Living Expenses: Not hard at all   Food Insecurity: No Food Insecurity (12/4/2023)    [facility]    Hunger Vital Sign     Worried About Running Out of Food in the Last Year: Never true     Ran Out of Food in the Last Year: Never true   Transportation Needs: No Transportation Needs (12/4/2023)    [facility]    PRAPARE - Transportation     Lack of Transportation (Medical): No     Lack of Transportation (Non-Medical): No   Physical Activity: Not on file   Stress: Not on file   Social Connections: Unknown (6/18/2024)    Received from [facility]     How often do you feel lonely or isolated from those around you? (Adult - for ages 18 years and over): Not on file   Intimate Partner Violence: Not At Risk (12/4/2023)    [facility]    Humiliation, Afraid, Rape, and Kick questionnaire     Fear of Current or Ex-Partner: No     Emotionally Abused: No     Physically Abused: No     Sexually Abused: No   Housing Stability: Low Risk  (12/4/2023)    [facility]    Housing Stability Vital Sign     Unable to Pay for Housing in the Last Year: No     Number of Places Lived in the Last Year: 1     Unstable Housing in the Last Year: No     Family History   Problem Relation Age of Onset    Cancer Mother         ovary    Diabetes Mother     Cancer Maternal Aunt         ovary    Leukemia Maternal Aunt     Heart disease Neg Hx        Review of Systems    Constitutional:  Negative for chills, diaphoresis, fatigue and fever.   Respiratory:  Negative for apnea, cough, chest tightness, shortness of breath and wheezing.    Cardiovascular:  Negative for chest pain, palpitations and leg swelling.   Gastrointestinal:  Negative for abdominal distention, abdominal pain, anal bleeding, constipation, diarrhea, nausea, rectal pain and vomiting.   Genitourinary:  Negative for difficulty urinating, dyspareunia, dysuria, frequency, hematuria, menstrual problem, pelvic pain, urgency, vaginal bleeding, vaginal discharge and vaginal pain.   Musculoskeletal:  Negative for arthralgias, back pain and myalgias.   Skin:  Negative for color change and rash.   Neurological:  Negative for dizziness, syncope, light-headedness, numbness and headaches.   Hematological:  Negative for adenopathy. Does not bruise/bleed easily.   Psychiatric/Behavioral:  Negative for dysphoric mood and sleep disturbance. The patient is not nervous/anxious.        Objective   Physical Exam  OBGyn Exam     Objective      /64 (BP Location: Left arm, Patient Position: Sitting)   Wt 78.9 kg (174 lb)   BMI 27.25 kg/m²     General:   alert and oriented, in no acute distress   Neck:    Breast:    Heart:    Lungs:    Abdomen: soft, non-tender, without masses or organomegaly   Vulva:    Vagina:    Cervix:    Uterus:    Adnexa:    Rectum:     Psych:  Normal mood and affect   Skin:  Without obvious lesions   Eyes: symmetric, with normal movements and reactivity   Musculoskeletal:  Normal muscle tone and movements appreciated

## 2025-04-14 ENCOUNTER — APPOINTMENT (OUTPATIENT)
Age: 50
End: 2025-04-14
Attending: ORTHOPAEDIC SURGERY
Payer: COMMERCIAL

## 2025-04-14 ENCOUNTER — OFFICE VISIT (OUTPATIENT)
Age: 50
End: 2025-04-14
Payer: COMMERCIAL

## 2025-04-14 VITALS — WEIGHT: 174 LBS | HEIGHT: 67 IN | BODY MASS INDEX: 27.31 KG/M2

## 2025-04-14 DIAGNOSIS — M41.85 OTHER FORM OF SCOLIOSIS OF THORACOLUMBAR SPINE: ICD-10-CM

## 2025-04-14 DIAGNOSIS — G89.29 CHRONIC MIDLINE LOW BACK PAIN WITHOUT SCIATICA: ICD-10-CM

## 2025-04-14 DIAGNOSIS — M54.50 CHRONIC MIDLINE LOW BACK PAIN WITHOUT SCIATICA: ICD-10-CM

## 2025-04-14 PROCEDURE — 99203 OFFICE O/P NEW LOW 30 MIN: CPT | Performed by: ORTHOPAEDIC SURGERY

## 2025-04-14 PROCEDURE — 72080 X-RAY EXAM THORACOLMB 2/> VW: CPT

## 2025-04-14 NOTE — PROGRESS NOTES
"Name: Myrna Silvestre      : 1975       MRN: [id number]   Encounter Provider: Linda Nelson MD   Encounter Date: 25  Encounter department: [facility] ORTHOP[facility] C[facility] ORTHOPEDIC SPINE SURGERY      History of Present Illness    Myrna Silvestre is a 49 y.o. female who presents for initial evaluation of her thoracic and lumbar spine. Pain has been present for multiple years. The patient reports that their pain is mostly located in the midline of her spine, with radiation into both upper extremities to the shoulders and both lower extremities to the knees. Endorses numbness and tingling into bilateral lower extremities, stopping above the knees. She has previously undergone physical therapy, chiropractic adjustments, spinal injections with pain management at [facility]. Denies prior history of spine surgery.    Diabetic: Yes   Nicotine use: No  BMI: Estimated body mass index is 27.25 kg/m² as calculated from the following:    Height as of this encounter: 5' 7\" (1.702 m).    Weight as of this encounter: 78.9 kg (174 lb).  Blood thinners: Eliquis      ALLERGIES: No Known Allergies    MEDICATIONS:    Current Outpatient Medications:     albuterol (PROVENTIL HFA,VENTOLIN HFA) 90 mcg/act inhaler, Inhale 2 puffs every 6 (six) hours as needed for wheezing, Disp: , Rfl:     Ascorbic Acid (vitamin C) 100 MG tablet, Take 100 mg by mouth daily, Disp: , Rfl:     b complex vitamins capsule, Take 1 capsule by mouth in the morning, Disp: , Rfl:     buPROPion (WELLBUTRIN SR) 150 mg 12 hr tablet, Take 150 mg by mouth in the morning, Disp: , Rfl:     dicyclomine (BENTYL) 20 mg tablet, Take 1 tablet (20 mg total) by mouth every 6 (six) hours as needed (diarrhea, abdominal cramps), Disp: 15 tablet, Rfl: 0    Eliquis 5 MG, Take 5 mg by mouth 2 (two) times a day, Disp: , Rfl:     Ergocalciferol (VITAMIN D2 PO), Take 50,000 Units by mouth once a week, Disp: , Rfl:     ezetimibe (ZETIA) 10 mg tablet, Take 10 " "mg by mouth daily, Disp: , Rfl:     ferrous sulfate 324 (65 Fe) mg, Take 1 tablet (324 mg total) by mouth 2 (two) times a day, Disp: 60 tablet, Rfl: 8    folic acid (FOLVITE) 1 mg tablet, TAKE 1 TABLET(1 MG) BY MOUTH DAILY, Disp: 90 tablet, Rfl: 1    levothyroxine 100 mcg tablet, Take 100 mcg by mouth daily 100 mcg Monday through Friday 88 Saturday and Sunday, Disp: , Rfl:     Linzess 145 MCG CAPS, Take 145 mcg by mouth daily, Disp: , Rfl:     meclizine (ANTIVERT) 12.5 MG tablet, Take 12.5 mg by mouth daily as needed for dizziness or nausea, Disp: , Rfl:     metoprolol succinate (TOPROL-XL) 100 mg 24 hr tablet, Take 100 mg by mouth daily, Disp: , Rfl:     metroNIDAZOLE (METROGEL) 0.75 % gel, Apply topically in the morning, Disp: 45 g, Rfl: 1    norethindrone (Aygestin) 5 mg tablet, Take 1 tablet (5 mg total) by mouth daily, Disp: 30 tablet, Rfl: 1    ondansetron (ZOFRAN-ODT) 4 mg disintegrating tablet, Take 1 tablet (4 mg total) by mouth every 6 (six) hours as needed for nausea, Disp: 10 tablet, Rfl: 0    pantoprazole (PROTONIX) 40 mg tablet, Take 1 tablet by mouth 2 (two) times a day, Disp: , Rfl:     rosuvastatin (CRESTOR) 40 MG tablet, Take 40 mg by mouth daily, Disp: , Rfl:     Spiriva Respimat 2.5 MCG/ACT AERS inhaler, Inhale 2 puffs daily, Disp: , Rfl:     spironolactone (ALDACTONE) 50 mg tablet, Take 50 mg by mouth daily, Disp: , Rfl:     Syringe/Needle, Disp, 27G X 1/2\" 1 ML MISC, As directed with methotrexate use, Disp: , Rfl:     tolterodine (DETROL LA) 4 mg 24 hr capsule, Take 4 mg by mouth daily, Disp: , Rfl:     vitamin A 3 MG (71642 UT) capsule, Take 10,000 Units by mouth daily, Disp: , Rfl:     vitamin E, tocopherol, 400 units capsule, Take 400 Units by mouth daily, Disp: , Rfl:     Wegovy 1 MG/0.5ML, Inject 1 mg under the skin Once a week, Disp: , Rfl:     Abatacept (Orencia ClickJect) 125 MG/ML SOAJ, Inject 125 mg under the skin, Disp: , Rfl:     busPIRone (BUSPAR) 15 mg tablet, Take 15 mg by mouth " 2 (two) times a day (Patient not taking: Reported on 4/3/2025), Disp: , Rfl:     DULoxetine (CYMBALTA) 60 mg delayed release capsule, Take 60 mg by mouth daily (Patient not taking: Reported on 4/3/2025), Disp: , Rfl:     FeroSul 325 (65 Fe) MG tablet, Take 1 tablet by mouth daily with breakfast (Patient not taking: Reported on 2023), Disp: , Rfl:     lubiprostone (AMITIZA) 24 mcg capsule, TAKE 1 CAPSULE (24 MCG TOTAL) BY MOUTH 2 (TWO) TIMES A DAY WITH MEALS (Patient not taking: Reported on 10/31/2024), Disp: , Rfl:     metFORMIN (GLUCOPHAGE-XR) 750 mg 24 hr tablet, Take 750 mg by mouth daily with breakfast, Disp: , Rfl:     Methotrexate Sodium (methotrexate, PF,) 250 MG/10ML injection, Inject 25 mg under the skin once a week, Disp: , Rfl:     metoprolol tartrate (LOPRESSOR) 25 mg tablet, Take 25 mg by mouth 2 (two) times a day (Patient not taking: Reported on 10/31/2024), Disp: , Rfl:     Mirabegron ER (Myrbetriq) 50 MG TB24, Take 50 mg by mouth daily, Disp: , Rfl:     predniSONE 10 mg tablet, PLEASE TAKE 1 TABLET BY MOUTH DAILY UNTIL YOUR NEXT VISIT (Patient not taking: No sig reported), Disp: , Rfl:     SUMAtriptan (IMITREX) 50 mg tablet, Take 50 mg by mouth (Patient not taking: Reported on 2022), Disp: , Rfl:      PAST MEDICAL HISTORY:   Past Medical History:   Diagnosis Date    Arthritis     Disease of thyroid gland     Fibromyalgia     Scoliosis        PAST SURGICAL HISTORY:  Past Surgical History:   Procedure Laterality Date     SECTION      CHOLECYSTECTOMY         SOCIAL HISTORY:  Social History     Tobacco Use   Smoking Status Never   Smokeless Tobacco Never        Physical Exam    49 y.o. female sitting comfortably on exam chair in no apparent distress.   Ambulates with normal gait  ROM:    Full and painless ROM of both upper extremities    Full and painless ROM of bilateral ankles, knees, and hips  Patient is able to go up on toes and on heels   Patient is  able to balance on 1 leg    Non-TTP over spinous processes and paraspinal muscles  Strength RLE: hip flexion 5/5, knee extension 5/5, knee flexion 5/5 , dorsiflexion 5/5, plantar flexion 5/5  Strength LLE: hip flexion 5/5, knee extension 5/5, knee flexion 5/5 , dorsiflexion 5/5, plantar flexion 5/5  Strength RUE:  finger abduction 5/5,  strength 5/5, wrist flexion 5/5, wrist extension 5/5 , elbow flexion 5/5, elbow extension 5/5, shoulder external rotation 5/5  Strength LUE:  finger abduction 5/5,  strength 5/5, wrist flexion 5/5, wrist extension 5/5 , elbow flexion 5/5, elbow extension 5/5, shoulder external rotation 5/5  Sensation is intact and equal bilaterally in upper extremities  Sensation diminished to light touch to the right lower extremity   Reflexes:    Absent throughout bilateral lower extremities    R triceps 2+, R biceps 2+, R brachioradialis 2+   L triceps absent, L biceps 2+, L brachioradialis 2+   Radiorelease sign negative         RADIOGRAPHIC STUDIES:  MRI, lumbar spine, 2/17/2022, REPORT ONLY: Edema in the right pedicle and articular pillar of L5 with marked thinning, suspicious for a small spondylolysis. Mild spondylotic changes as   above.   Xrays, entire spine, 3/25/2022, REPORT ONLY: S-shaped thoracolumbar scoliosis. Measured from T4 through T9, there is 11 degrees dextroscoliosis. Measured from T10 through L5, there is 16 degrees levoscoliosis   Xrays, thoracolumbar spine, 4/14/2025: Minimal curvature lower thoracic spine.  The curvature is not significant of to be marked as scoliosis    Assessment & Plan  Other form of scoliosis of thoracolumbar spine    Orders:    Ambulatory Referral to Orthopedic Surgery    XR spine thoracolumbar 2 vw; Future    Ambulatory Referral to Physical Therapy; Future    Chronic midline low back pain without sciatica    Orders:    Ambulatory Referral to Orthopedic Surgery    XR spine thoracolumbar 2 vw; Future    Ambulatory Referral to Physical Therapy; Future           PLAN:  49  y.o. female with chronic midline thoracolumbar spine pain and scoliosis.      Xray images of the thoracolumbar spine, performed today, were reviewed and discussed with the patient during today's visit. Discussed the natural course of scoliosis. Potential options for treatment were discussed, as well. There is no role for surgical intervention at this time. It is unlikely that the patient will need surgical intervention in the foreseeable future.     At this time, the best plan of care for the patient is for her to return to formal physical therapy to decreased symptom severity and improve her range of motion. Patient agreeable to this plan. A referral to PT was provided during today's visit.     Patient is to return on an as-needed basis for re-evaluation.       Scribe Attestation      I,:  Francoise Saleem PA-C am acting as a scribe while in the presence of the attending physician.:       I,:  Linda Nelson MD personally performed the services described in this documentation    as scribed in my presence.:

## 2025-05-08 ENCOUNTER — HOSPITAL ENCOUNTER (OUTPATIENT)
Dept: NON INVASIVE DIAGNOSTICS | Facility: HOSPITAL | Age: 50
Discharge: HOME | End: 2025-05-08
Attending: INTERNAL MEDICINE
Payer: COMMERCIAL

## 2025-05-08 DIAGNOSIS — E78.49 OTHER HYPERLIPIDEMIA: ICD-10-CM

## 2025-05-08 DIAGNOSIS — R00.2 PALPITATIONS: ICD-10-CM

## 2025-05-08 PROCEDURE — 93880 EXTRACRANIAL BILAT STUDY: CPT

## 2025-05-09 ENCOUNTER — RESULTS FOLLOW-UP (OUTPATIENT)
Age: 50
End: 2025-05-09

## 2025-05-09 PROCEDURE — 93880 EXTRACRANIAL BILAT STUDY: CPT | Performed by: SURGERY

## 2025-05-15 NOTE — TELEPHONE ENCOUNTER
Patient returned call, conveyed Imaging results  Provider review left by Dr. Patiño, patient request a callback with clarification. Please advise patient if any further questions.

## 2025-05-22 ENCOUNTER — HOSPITAL ENCOUNTER (EMERGENCY)
Facility: HOSPITAL | Age: 50
End: 2025-05-23
Attending: EMERGENCY MEDICINE
Payer: COMMERCIAL

## 2025-05-22 ENCOUNTER — APPOINTMENT (EMERGENCY)
Dept: CT IMAGING | Facility: HOSPITAL | Age: 50
End: 2025-05-22
Payer: COMMERCIAL

## 2025-05-22 DIAGNOSIS — I26.99 PULMONARY EMBOLISM, OTHER, UNSPECIFIED CHRONICITY, UNSPECIFIED WHETHER ACUTE COR PULMONALE PRESENT (HCC): ICD-10-CM

## 2025-05-22 DIAGNOSIS — F32.A DEPRESSION: Primary | ICD-10-CM

## 2025-05-22 DIAGNOSIS — R07.9 CHEST PAIN, UNSPECIFIED: ICD-10-CM

## 2025-05-22 DIAGNOSIS — R44.3 HALLUCINATIONS: ICD-10-CM

## 2025-05-22 DIAGNOSIS — D75.839 THROMBOCYTOSIS: ICD-10-CM

## 2025-05-22 DIAGNOSIS — R73.01 IFG (IMPAIRED FASTING GLUCOSE): ICD-10-CM

## 2025-05-22 DIAGNOSIS — R42 LIGHTHEADEDNESS: ICD-10-CM

## 2025-05-22 LAB
ALBUMIN SERPL BCG-MCNC: 4.4 G/DL (ref 3.5–5)
ALP SERPL-CCNC: 51 U/L (ref 34–104)
ALT SERPL W P-5'-P-CCNC: 9 U/L (ref 7–52)
AMPHETAMINES SERPL QL SCN: NEGATIVE
ANION GAP SERPL CALCULATED.3IONS-SCNC: 8 MMOL/L (ref 4–13)
AST SERPL W P-5'-P-CCNC: 10 U/L (ref 13–39)
ATRIAL RATE: 98 BPM
BACTERIA UR QL AUTO: NORMAL /HPF
BARBITURATES UR QL: NEGATIVE
BASOPHILS # BLD AUTO: 0.05 THOUSANDS/ÂΜL (ref 0–0.1)
BASOPHILS NFR BLD AUTO: 1 % (ref 0–1)
BENZODIAZ UR QL: NEGATIVE
BILIRUB SERPL-MCNC: 0.23 MG/DL (ref 0.2–1)
BILIRUB UR QL STRIP: NEGATIVE
BUN SERPL-MCNC: 12 MG/DL (ref 5–25)
CALCIUM SERPL-MCNC: 9.5 MG/DL (ref 8.4–10.2)
CARDIAC TROPONIN I PNL SERPL HS: <2 NG/L (ref ?–50)
CHLORIDE SERPL-SCNC: 102 MMOL/L (ref 96–108)
CLARITY UR: CLEAR
CO2 SERPL-SCNC: 27 MMOL/L (ref 21–32)
COCAINE UR QL: NEGATIVE
COLOR UR: YELLOW
CREAT SERPL-MCNC: 0.58 MG/DL (ref 0.6–1.3)
D DIMER PPP FEU-MCNC: 1.74 UG/ML FEU
EOSINOPHIL # BLD AUTO: 0.05 THOUSAND/ÂΜL (ref 0–0.61)
EOSINOPHIL NFR BLD AUTO: 1 % (ref 0–6)
ERYTHROCYTE [DISTWIDTH] IN BLOOD BY AUTOMATED COUNT: 14.5 % (ref 11.6–15.1)
ETHANOL SERPL-MCNC: <10 MG/DL
FENTANYL UR QL SCN: NEGATIVE
GFR SERPL CREATININE-BSD FRML MDRD: 108 ML/MIN/1.73SQ M
GLUCOSE SERPL-MCNC: 90 MG/DL (ref 65–140)
GLUCOSE UR STRIP-MCNC: NEGATIVE MG/DL
HCT VFR BLD AUTO: 34.4 % (ref 34.8–46.1)
HGB BLD-MCNC: 10.7 G/DL (ref 11.5–15.4)
HGB UR QL STRIP.AUTO: ABNORMAL
HYDROCODONE UR QL SCN: NEGATIVE
IMM GRANULOCYTES # BLD AUTO: 0.02 THOUSAND/UL (ref 0–0.2)
IMM GRANULOCYTES NFR BLD AUTO: 0 % (ref 0–2)
KETONES UR STRIP-MCNC: NEGATIVE MG/DL
LEUKOCYTE ESTERASE UR QL STRIP: NEGATIVE
LYMPHOCYTES # BLD AUTO: 1.46 THOUSANDS/ÂΜL (ref 0.6–4.47)
LYMPHOCYTES NFR BLD AUTO: 18 % (ref 14–44)
MCH RBC QN AUTO: 27.2 PG (ref 26.8–34.3)
MCHC RBC AUTO-ENTMCNC: 31.1 G/DL (ref 31.4–37.4)
MCV RBC AUTO: 87 FL (ref 82–98)
METHADONE UR QL: NEGATIVE
MONOCYTES # BLD AUTO: 0.56 THOUSAND/ÂΜL (ref 0.17–1.22)
MONOCYTES NFR BLD AUTO: 7 % (ref 4–12)
NEUTROPHILS # BLD AUTO: 5.85 THOUSANDS/ÂΜL (ref 1.85–7.62)
NEUTS SEG NFR BLD AUTO: 73 % (ref 43–75)
NITRITE UR QL STRIP: NEGATIVE
NON-SQ EPI CELLS URNS QL MICRO: NORMAL /HPF
NRBC BLD AUTO-RTO: 0 /100 WBCS
OPIATES UR QL SCN: NEGATIVE
OXYCODONE+OXYMORPHONE UR QL SCN: NEGATIVE
P AXIS: 69 DEGREES
PCP UR QL: NEGATIVE
PH UR STRIP.AUTO: 5.5 [PH] (ref 4.5–8)
PLATELET # BLD AUTO: 393 THOUSANDS/UL (ref 149–390)
PMV BLD AUTO: 10 FL (ref 8.9–12.7)
POTASSIUM SERPL-SCNC: 3.5 MMOL/L (ref 3.5–5.3)
PR INTERVAL: 106 MS
PROT SERPL-MCNC: 8.1 G/DL (ref 6.4–8.4)
PROT UR STRIP-MCNC: NEGATIVE MG/DL
QRS AXIS: 82 DEGREES
QRSD INTERVAL: 82 MS
QT INTERVAL: 340 MS
QTC INTERVAL: 434 MS
RBC # BLD AUTO: 3.94 MILLION/UL (ref 3.81–5.12)
RBC #/AREA URNS AUTO: NORMAL /HPF
SODIUM SERPL-SCNC: 137 MMOL/L (ref 135–147)
SP GR UR STRIP.AUTO: 1.02 (ref 1–1.03)
T WAVE AXIS: 30 DEGREES
THC UR QL: NEGATIVE
TSH SERPL DL<=0.05 MIU/L-ACNC: 4.5 UIU/ML (ref 0.45–4.5)
UROBILINOGEN UR QL STRIP.AUTO: 0.2 E.U./DL
VENTRICULAR RATE: 98 BPM
WBC # BLD AUTO: 7.99 THOUSAND/UL (ref 4.31–10.16)
WBC #/AREA URNS AUTO: NORMAL /HPF

## 2025-05-22 PROCEDURE — 93010 ELECTROCARDIOGRAM REPORT: CPT | Performed by: STUDENT IN AN ORGANIZED HEALTH CARE EDUCATION/TRAINING PROGRAM

## 2025-05-22 PROCEDURE — 80307 DRUG TEST PRSMV CHEM ANLYZR: CPT | Performed by: EMERGENCY MEDICINE

## 2025-05-22 PROCEDURE — 99285 EMERGENCY DEPT VISIT HI MDM: CPT

## 2025-05-22 PROCEDURE — 93005 ELECTROCARDIOGRAM TRACING: CPT

## 2025-05-22 PROCEDURE — 85379 FIBRIN DEGRADATION QUANT: CPT | Performed by: EMERGENCY MEDICINE

## 2025-05-22 PROCEDURE — 82077 ASSAY SPEC XCP UR&BREATH IA: CPT | Performed by: EMERGENCY MEDICINE

## 2025-05-22 PROCEDURE — 84484 ASSAY OF TROPONIN QUANT: CPT | Performed by: EMERGENCY MEDICINE

## 2025-05-22 PROCEDURE — 96360 HYDRATION IV INFUSION INIT: CPT

## 2025-05-22 PROCEDURE — 85025 COMPLETE CBC W/AUTO DIFF WBC: CPT | Performed by: EMERGENCY MEDICINE

## 2025-05-22 PROCEDURE — 99285 EMERGENCY DEPT VISIT HI MDM: CPT | Performed by: EMERGENCY MEDICINE

## 2025-05-22 PROCEDURE — 81001 URINALYSIS AUTO W/SCOPE: CPT

## 2025-05-22 PROCEDURE — 84443 ASSAY THYROID STIM HORMONE: CPT | Performed by: EMERGENCY MEDICINE

## 2025-05-22 PROCEDURE — 36415 COLL VENOUS BLD VENIPUNCTURE: CPT | Performed by: EMERGENCY MEDICINE

## 2025-05-22 PROCEDURE — 80053 COMPREHEN METABOLIC PANEL: CPT | Performed by: EMERGENCY MEDICINE

## 2025-05-22 PROCEDURE — 71275 CT ANGIOGRAPHY CHEST: CPT

## 2025-05-22 RX ADMIN — IOHEXOL 85 ML: 350 INJECTION, SOLUTION INTRAVENOUS at 21:22

## 2025-05-22 RX ADMIN — SODIUM CHLORIDE 1000 ML: 0.9 INJECTION, SOLUTION INTRAVENOUS at 18:47

## 2025-05-23 ENCOUNTER — HOSPITAL ENCOUNTER (INPATIENT)
Facility: HOSPITAL | Age: 50
LOS: 5 days | Discharge: HOME | DRG: 751 | End: 2025-05-28
Attending: PSYCHIATRY & NEUROLOGY | Admitting: PSYCHIATRY & NEUROLOGY
Payer: COMMERCIAL

## 2025-05-23 VITALS
DIASTOLIC BLOOD PRESSURE: 63 MMHG | BODY MASS INDEX: 27.35 KG/M2 | OXYGEN SATURATION: 100 % | SYSTOLIC BLOOD PRESSURE: 102 MMHG | WEIGHT: 174.6 LBS | TEMPERATURE: 98.2 F | RESPIRATION RATE: 14 BRPM | HEART RATE: 70 BPM

## 2025-05-23 DIAGNOSIS — E55.9 VITAMIN D DEFICIENCY: ICD-10-CM

## 2025-05-23 DIAGNOSIS — E53.8 VITAMIN B12 DEFICIENCY: ICD-10-CM

## 2025-05-23 DIAGNOSIS — I26.99 PULMONARY EMBOLUS (HCC): ICD-10-CM

## 2025-05-23 DIAGNOSIS — D75.839 THROMBOCYTOSIS: ICD-10-CM

## 2025-05-23 DIAGNOSIS — F32.3 MDD (MAJOR DEPRESSIVE DISORDER), SINGLE EPISODE, SEVERE WITH PSYCHOTIC FEATURES (HCC): Primary | ICD-10-CM

## 2025-05-23 DIAGNOSIS — I26.99 PULMONARY EMBOLISM, OTHER, UNSPECIFIED CHRONICITY, UNSPECIFIED WHETHER ACUTE COR PULMONALE PRESENT (HCC): ICD-10-CM

## 2025-05-23 DIAGNOSIS — M06.09 RHEUMATOID ARTHRITIS OF MULTIPLE SITES WITH NEGATIVE RHEUMATOID FACTOR (HCC): ICD-10-CM

## 2025-05-23 DIAGNOSIS — R73.01 IFG (IMPAIRED FASTING GLUCOSE): ICD-10-CM

## 2025-05-23 PROCEDURE — GZ59ZZZ INDIVIDUAL PSYCHOTHERAPY, PSYCHOPHYSIOLOGICAL: ICD-10-PCS | Performed by: PSYCHIATRY & NEUROLOGY

## 2025-05-23 PROCEDURE — GZHZZZZ GROUP PSYCHOTHERAPY: ICD-10-PCS | Performed by: PSYCHIATRY & NEUROLOGY

## 2025-05-23 RX ORDER — EZETIMIBE 10 MG/1
10 TABLET ORAL DAILY
Status: DISCONTINUED | OUTPATIENT
Start: 2025-05-23 | End: 2025-05-23 | Stop reason: HOSPADM

## 2025-05-23 RX ORDER — FOLIC ACID 1 MG/1
1000 TABLET ORAL DAILY
Status: CANCELLED | OUTPATIENT
Start: 2025-05-24

## 2025-05-23 RX ORDER — FERROUS SULFATE 325(65) MG
325 TABLET ORAL
Status: DISCONTINUED | OUTPATIENT
Start: 2025-05-24 | End: 2025-05-28 | Stop reason: HOSPADM

## 2025-05-23 RX ORDER — LEVOTHYROXINE SODIUM 75 UG/1
150 TABLET ORAL
Status: DISCONTINUED | OUTPATIENT
Start: 2025-05-26 | End: 2025-05-28 | Stop reason: HOSPADM

## 2025-05-23 RX ORDER — LORAZEPAM 0.5 MG/1
0.5 TABLET ORAL
Status: CANCELLED | OUTPATIENT
Start: 2025-05-23

## 2025-05-23 RX ORDER — LORAZEPAM 1 MG/1
1 TABLET ORAL
Status: DISCONTINUED | OUTPATIENT
Start: 2025-05-23 | End: 2025-05-28 | Stop reason: HOSPADM

## 2025-05-23 RX ORDER — OLANZAPINE 5 MG/1
5 TABLET, FILM COATED ORAL
Status: CANCELLED | OUTPATIENT
Start: 2025-05-23

## 2025-05-23 RX ORDER — METFORMIN HYDROCHLORIDE 750 MG/1
750 TABLET, EXTENDED RELEASE ORAL
Status: DISCONTINUED | OUTPATIENT
Start: 2025-05-24 | End: 2025-05-23

## 2025-05-23 RX ORDER — DICYCLOMINE HCL 20 MG
20 TABLET ORAL EVERY 6 HOURS PRN
Status: CANCELLED | OUTPATIENT
Start: 2025-05-23

## 2025-05-23 RX ORDER — DICYCLOMINE HCL 20 MG
20 TABLET ORAL EVERY 6 HOURS PRN
Status: DISCONTINUED | OUTPATIENT
Start: 2025-05-23 | End: 2025-05-23 | Stop reason: HOSPADM

## 2025-05-23 RX ORDER — ONDANSETRON 4 MG/1
4 TABLET, ORALLY DISINTEGRATING ORAL EVERY 6 HOURS PRN
Status: CANCELLED | OUTPATIENT
Start: 2025-05-23

## 2025-05-23 RX ORDER — LORAZEPAM 2 MG/ML
1 INJECTION INTRAMUSCULAR
Status: DISCONTINUED | OUTPATIENT
Start: 2025-05-23 | End: 2025-05-28 | Stop reason: HOSPADM

## 2025-05-23 RX ORDER — BENZTROPINE MESYLATE 0.5 MG/1
0.5 TABLET ORAL
Status: CANCELLED | OUTPATIENT
Start: 2025-05-23

## 2025-05-23 RX ORDER — BENZTROPINE MESYLATE 1 MG/ML
1 INJECTION, SOLUTION INTRAMUSCULAR; INTRAVENOUS
Status: DISCONTINUED | OUTPATIENT
Start: 2025-05-23 | End: 2025-05-28 | Stop reason: HOSPADM

## 2025-05-23 RX ORDER — HYDROXYZINE HYDROCHLORIDE 25 MG/1
25 TABLET, FILM COATED ORAL
Status: CANCELLED | OUTPATIENT
Start: 2025-05-23

## 2025-05-23 RX ORDER — ACETAMINOPHEN 325 MG/1
975 TABLET ORAL EVERY 6 HOURS PRN
Status: CANCELLED | OUTPATIENT
Start: 2025-05-23

## 2025-05-23 RX ORDER — LUBIPROSTONE 24 UG/1
24 CAPSULE ORAL 2 TIMES DAILY
Status: DISCONTINUED | OUTPATIENT
Start: 2025-05-23 | End: 2025-05-23 | Stop reason: HOSPADM

## 2025-05-23 RX ORDER — METOPROLOL SUCCINATE 100 MG/1
100 TABLET, EXTENDED RELEASE ORAL DAILY
Status: CANCELLED | OUTPATIENT
Start: 2025-05-24

## 2025-05-23 RX ORDER — MECLIZINE HCL 12.5 MG 12.5 MG/1
12.5 TABLET ORAL DAILY PRN
Status: DISCONTINUED | OUTPATIENT
Start: 2025-05-23 | End: 2025-05-27

## 2025-05-23 RX ORDER — LUBIPROSTONE 24 UG/1
24 CAPSULE ORAL 2 TIMES DAILY
Status: DISCONTINUED | OUTPATIENT
Start: 2025-05-23 | End: 2025-05-28 | Stop reason: HOSPADM

## 2025-05-23 RX ORDER — OLANZAPINE 5 MG/1
5 TABLET, FILM COATED ORAL
Status: DISCONTINUED | OUTPATIENT
Start: 2025-05-23 | End: 2025-05-28 | Stop reason: HOSPADM

## 2025-05-23 RX ORDER — METFORMIN HYDROCHLORIDE 750 MG/1
750 TABLET, EXTENDED RELEASE ORAL
Status: CANCELLED | OUTPATIENT
Start: 2025-05-24

## 2025-05-23 RX ORDER — METOPROLOL SUCCINATE 100 MG/1
100 TABLET, EXTENDED RELEASE ORAL DAILY
Status: DISCONTINUED | OUTPATIENT
Start: 2025-05-23 | End: 2025-05-23 | Stop reason: HOSPADM

## 2025-05-23 RX ORDER — LEVOTHYROXINE SODIUM 100 UG/1
100 TABLET ORAL DAILY
Status: DISCONTINUED | OUTPATIENT
Start: 2025-05-23 | End: 2025-05-23

## 2025-05-23 RX ORDER — FERROUS SULFATE 325(65) MG
325 TABLET ORAL
Status: CANCELLED | OUTPATIENT
Start: 2025-05-24

## 2025-05-23 RX ORDER — MECLIZINE HCL 12.5 MG 12.5 MG/1
12.5 TABLET ORAL DAILY PRN
Status: DISCONTINUED | OUTPATIENT
Start: 2025-05-23 | End: 2025-05-23 | Stop reason: HOSPADM

## 2025-05-23 RX ORDER — LEVOTHYROXINE SODIUM 75 UG/1
150 TABLET ORAL
Status: DISCONTINUED | OUTPATIENT
Start: 2025-05-23 | End: 2025-05-23 | Stop reason: HOSPADM

## 2025-05-23 RX ORDER — ACETAMINOPHEN 325 MG/1
650 TABLET ORAL EVERY 4 HOURS PRN
Status: DISCONTINUED | OUTPATIENT
Start: 2025-05-23 | End: 2025-05-28 | Stop reason: HOSPADM

## 2025-05-23 RX ORDER — LEVOTHYROXINE SODIUM 150 UG/1
150 TABLET ORAL SEE ADMIN INSTRUCTIONS
Status: ON HOLD | COMMUNITY

## 2025-05-23 RX ORDER — METFORMIN HYDROCHLORIDE 750 MG/1
750 TABLET, EXTENDED RELEASE ORAL
Status: DISCONTINUED | OUTPATIENT
Start: 2025-05-23 | End: 2025-05-23 | Stop reason: HOSPADM

## 2025-05-23 RX ORDER — OLANZAPINE 10 MG/2ML
5 INJECTION, POWDER, FOR SOLUTION INTRAMUSCULAR
Status: CANCELLED | OUTPATIENT
Start: 2025-05-23

## 2025-05-23 RX ORDER — ALBUTEROL SULFATE 90 UG/1
2 INHALANT RESPIRATORY (INHALATION) EVERY 6 HOURS PRN
Status: DISCONTINUED | OUTPATIENT
Start: 2025-05-23 | End: 2025-05-23 | Stop reason: HOSPADM

## 2025-05-23 RX ORDER — ACETAMINOPHEN 325 MG/1
975 TABLET ORAL EVERY 6 HOURS PRN
Status: DISCONTINUED | OUTPATIENT
Start: 2025-05-23 | End: 2025-05-28 | Stop reason: HOSPADM

## 2025-05-23 RX ORDER — ACETAMINOPHEN 325 MG/1
650 TABLET ORAL EVERY 4 HOURS PRN
Status: CANCELLED | OUTPATIENT
Start: 2025-05-23

## 2025-05-23 RX ORDER — MECLIZINE HCL 12.5 MG 12.5 MG/1
12.5 TABLET ORAL DAILY PRN
Status: CANCELLED | OUTPATIENT
Start: 2025-05-23

## 2025-05-23 RX ORDER — SPIRONOLACTONE 25 MG/1
50 TABLET ORAL DAILY
Status: CANCELLED | OUTPATIENT
Start: 2025-05-24

## 2025-05-23 RX ORDER — ONDANSETRON 4 MG/1
4 TABLET, ORALLY DISINTEGRATING ORAL EVERY 6 HOURS PRN
Status: DISCONTINUED | OUTPATIENT
Start: 2025-05-23 | End: 2025-05-28 | Stop reason: HOSPADM

## 2025-05-23 RX ORDER — BENZTROPINE MESYLATE 0.5 MG/1
0.5 TABLET ORAL
Status: DISCONTINUED | OUTPATIENT
Start: 2025-05-23 | End: 2025-05-28 | Stop reason: HOSPADM

## 2025-05-23 RX ORDER — METFORMIN HYDROCHLORIDE 500 MG/1
500 TABLET, EXTENDED RELEASE ORAL
Status: DISCONTINUED | OUTPATIENT
Start: 2025-05-24 | End: 2025-05-28 | Stop reason: HOSPADM

## 2025-05-23 RX ORDER — LORAZEPAM 0.5 MG/1
0.5 TABLET ORAL
Status: DISCONTINUED | OUTPATIENT
Start: 2025-05-23 | End: 2025-05-28 | Stop reason: HOSPADM

## 2025-05-23 RX ORDER — PANTOPRAZOLE SODIUM 40 MG/1
40 TABLET, DELAYED RELEASE ORAL 2 TIMES DAILY WITH MEALS
Status: DISCONTINUED | OUTPATIENT
Start: 2025-05-23 | End: 2025-05-23 | Stop reason: HOSPADM

## 2025-05-23 RX ORDER — OXYBUTYNIN CHLORIDE 5 MG/1
10 TABLET, EXTENDED RELEASE ORAL DAILY
Status: DISCONTINUED | OUTPATIENT
Start: 2025-05-24 | End: 2025-05-28 | Stop reason: HOSPADM

## 2025-05-23 RX ORDER — OLANZAPINE 10 MG/2ML
5 INJECTION, POWDER, FOR SOLUTION INTRAMUSCULAR
Status: DISCONTINUED | OUTPATIENT
Start: 2025-05-23 | End: 2025-05-28 | Stop reason: HOSPADM

## 2025-05-23 RX ORDER — OLANZAPINE 2.5 MG/1
2.5 TABLET, FILM COATED ORAL
Status: DISCONTINUED | OUTPATIENT
Start: 2025-05-23 | End: 2025-05-28 | Stop reason: HOSPADM

## 2025-05-23 RX ORDER — OXYBUTYNIN CHLORIDE 10 MG/1
10 TABLET, EXTENDED RELEASE ORAL DAILY
Status: CANCELLED | OUTPATIENT
Start: 2025-05-24

## 2025-05-23 RX ORDER — BENZTROPINE MESYLATE 1 MG/ML
1 INJECTION, SOLUTION INTRAMUSCULAR; INTRAVENOUS
Status: CANCELLED | OUTPATIENT
Start: 2025-05-23

## 2025-05-23 RX ORDER — EZETIMIBE 10 MG/1
10 TABLET ORAL DAILY
Status: CANCELLED | OUTPATIENT
Start: 2025-05-24

## 2025-05-23 RX ORDER — OLANZAPINE 5 MG/1
2.5 TABLET, FILM COATED ORAL
Status: CANCELLED | OUTPATIENT
Start: 2025-05-23

## 2025-05-23 RX ORDER — LEVOTHYROXINE SODIUM 75 UG/1
150 TABLET ORAL
Status: CANCELLED | OUTPATIENT
Start: 2025-05-26

## 2025-05-23 RX ORDER — PANTOPRAZOLE SODIUM 40 MG/1
40 TABLET, DELAYED RELEASE ORAL 2 TIMES DAILY WITH MEALS
Status: DISCONTINUED | OUTPATIENT
Start: 2025-05-23 | End: 2025-05-28 | Stop reason: HOSPADM

## 2025-05-23 RX ORDER — ALBUTEROL SULFATE 90 UG/1
2 INHALANT RESPIRATORY (INHALATION) EVERY 6 HOURS PRN
Status: DISCONTINUED | OUTPATIENT
Start: 2025-05-23 | End: 2025-05-28 | Stop reason: HOSPADM

## 2025-05-23 RX ORDER — FOLIC ACID 1 MG/1
1000 TABLET ORAL DAILY
Status: DISCONTINUED | OUTPATIENT
Start: 2025-05-23 | End: 2025-05-23 | Stop reason: HOSPADM

## 2025-05-23 RX ORDER — ONDANSETRON 4 MG/1
4 TABLET, ORALLY DISINTEGRATING ORAL EVERY 6 HOURS PRN
Status: DISCONTINUED | OUTPATIENT
Start: 2025-05-23 | End: 2025-05-23 | Stop reason: HOSPADM

## 2025-05-23 RX ORDER — SPIRONOLACTONE 25 MG/1
50 TABLET ORAL DAILY
Status: DISCONTINUED | OUTPATIENT
Start: 2025-05-23 | End: 2025-05-23 | Stop reason: HOSPADM

## 2025-05-23 RX ORDER — PANTOPRAZOLE SODIUM 40 MG/1
40 TABLET, DELAYED RELEASE ORAL 2 TIMES DAILY WITH MEALS
Status: CANCELLED | OUTPATIENT
Start: 2025-05-23

## 2025-05-23 RX ORDER — OXYBUTYNIN CHLORIDE 10 MG/1
10 TABLET, EXTENDED RELEASE ORAL DAILY
Status: DISCONTINUED | OUTPATIENT
Start: 2025-05-23 | End: 2025-05-23 | Stop reason: HOSPADM

## 2025-05-23 RX ORDER — ALBUTEROL SULFATE 90 UG/1
2 INHALANT RESPIRATORY (INHALATION) EVERY 6 HOURS PRN
Status: CANCELLED | OUTPATIENT
Start: 2025-05-23

## 2025-05-23 RX ORDER — LORAZEPAM 1 MG/1
1 TABLET ORAL
Status: CANCELLED | OUTPATIENT
Start: 2025-05-23

## 2025-05-23 RX ORDER — LUBIPROSTONE 24 UG/1
24 CAPSULE ORAL 2 TIMES DAILY
Status: CANCELLED | OUTPATIENT
Start: 2025-05-23

## 2025-05-23 RX ORDER — EZETIMIBE 10 MG/1
10 TABLET ORAL DAILY
Status: DISCONTINUED | OUTPATIENT
Start: 2025-05-24 | End: 2025-05-28 | Stop reason: HOSPADM

## 2025-05-23 RX ORDER — FOLIC ACID 1 MG/1
1000 TABLET ORAL DAILY
Status: DISCONTINUED | OUTPATIENT
Start: 2025-05-24 | End: 2025-05-28 | Stop reason: HOSPADM

## 2025-05-23 RX ORDER — FERROUS SULFATE 325(65) MG
325 TABLET ORAL
Status: DISCONTINUED | OUTPATIENT
Start: 2025-05-23 | End: 2025-05-23 | Stop reason: HOSPADM

## 2025-05-23 RX ORDER — VITAMIN A 3000 MCG
10000 CAPSULE ORAL DAILY
Status: DISCONTINUED | OUTPATIENT
Start: 2025-05-24 | End: 2025-05-28 | Stop reason: HOSPADM

## 2025-05-23 RX ORDER — BUSPIRONE HYDROCHLORIDE 15 MG/1
15 TABLET ORAL 2 TIMES DAILY
Status: DISCONTINUED | OUTPATIENT
Start: 2025-05-23 | End: 2025-05-28 | Stop reason: HOSPADM

## 2025-05-23 RX ORDER — SPIRONOLACTONE 50 MG/1
50 TABLET, FILM COATED ORAL DAILY
Status: DISCONTINUED | OUTPATIENT
Start: 2025-05-24 | End: 2025-05-26

## 2025-05-23 RX ORDER — HYDROXYZINE HYDROCHLORIDE 25 MG/1
25 TABLET, FILM COATED ORAL
Status: DISCONTINUED | OUTPATIENT
Start: 2025-05-23 | End: 2025-05-28 | Stop reason: HOSPADM

## 2025-05-23 RX ORDER — DICYCLOMINE HCL 20 MG
20 TABLET ORAL EVERY 6 HOURS PRN
Status: DISCONTINUED | OUTPATIENT
Start: 2025-05-23 | End: 2025-05-28 | Stop reason: HOSPADM

## 2025-05-23 RX ORDER — LORAZEPAM 2 MG/ML
1 INJECTION INTRAMUSCULAR
Status: CANCELLED | OUTPATIENT
Start: 2025-05-23

## 2025-05-23 RX ORDER — VITAMIN A 3000 MCG
10000 CAPSULE ORAL DAILY
Status: CANCELLED | OUTPATIENT
Start: 2025-05-24

## 2025-05-23 RX ORDER — METOPROLOL SUCCINATE 50 MG/1
100 TABLET, EXTENDED RELEASE ORAL DAILY
Status: DISCONTINUED | OUTPATIENT
Start: 2025-05-24 | End: 2025-05-26

## 2025-05-23 RX ORDER — LEVOTHYROXINE SODIUM 137 UG/1
137 TABLET ORAL SEE ADMIN INSTRUCTIONS
Status: ON HOLD | COMMUNITY

## 2025-05-23 RX ORDER — VITAMIN A 3000 MCG
10000 CAPSULE ORAL DAILY
Status: DISCONTINUED | OUTPATIENT
Start: 2025-05-23 | End: 2025-05-23 | Stop reason: HOSPADM

## 2025-05-23 RX ADMIN — PANTOPRAZOLE SODIUM 40 MG: 40 TABLET, DELAYED RELEASE ORAL at 17:14

## 2025-05-23 RX ADMIN — BUSPIRONE HYDROCHLORIDE 15 MG: 15 TABLET ORAL at 17:14

## 2025-05-23 RX ADMIN — METFORMIN HYDROCHLORIDE 750 MG: 750 TABLET, EXTENDED RELEASE ORAL at 09:03

## 2025-05-23 RX ADMIN — BUSPIRONE HYDROCHLORIDE 15 MG: 10 TABLET ORAL at 09:03

## 2025-05-23 RX ADMIN — PANTOPRAZOLE SODIUM 40 MG: 40 TABLET, DELAYED RELEASE ORAL at 09:03

## 2025-05-23 RX ADMIN — APIXABAN 2.5 MG: 5 TABLET, FILM COATED ORAL at 21:33

## 2025-05-23 RX ADMIN — UMECLIDINIUM 1 PUFF: 62.5 AEROSOL, POWDER ORAL at 10:03

## 2025-05-23 RX ADMIN — LEVOTHYROXINE SODIUM 150 MCG: 75 TABLET ORAL at 12:12

## 2025-05-23 RX ADMIN — APIXABAN 5 MG: 5 TABLET, FILM COATED ORAL at 09:03

## 2025-05-23 RX ADMIN — OXYBUTYNIN CHLORIDE 10 MG: 10 TABLET, EXTENDED RELEASE ORAL at 09:03

## 2025-05-23 RX ADMIN — LUBIPROSTONE 24 MCG: 24 CAPSULE, GELATIN COATED ORAL at 10:03

## 2025-05-23 RX ADMIN — FOLIC ACID 1000 MCG: 1 TABLET ORAL at 09:03

## 2025-05-23 RX ADMIN — FERROUS SULFATE TAB 325 MG (65 MG ELEMENTAL FE) 325 MG: 325 (65 FE) TAB at 09:03

## 2025-05-23 RX ADMIN — ACETAMINOPHEN 650 MG: 325 TABLET ORAL at 21:26

## 2025-05-23 RX ADMIN — Medication 3 MG: at 21:26

## 2025-05-23 RX ADMIN — EZETIMIBE 10 MG: 10 TABLET ORAL at 09:03

## 2025-05-23 NOTE — ED NOTES
201 Signed and Dated- Rights explained, bed search explained- Emailed to [facility]- Original on patient chart.     Patient would like local placement, and expresses some medical concerns. She is worried about medications being prescribed that would interfere with the long list of medical medications she is on daily.    Patient would be more appropriate for an older adult unit than a younger adult unit if staying in network as requested.    Nemesio Contreras  Crisis Intervention Specialist II  05/22/25

## 2025-05-23 NOTE — ED NOTES
Insurance Authorization for admission:   Phone call placed to [facility]  Phone number: [phone]    Spoke to Susan'    4 days approved.  Level of care: acute inpatient mental health  Review on 5/27/25  Authorization # provided to accepting facility upon admission notification.    Eligibility Verification System checked - ([phone]).  Online system / automated system indicates: active [facility], [facility]    Insurance Authorization for Transportation:  TBD  Phone call placed to **.   Phone number **.   Spoke to **.   Authorization #: **

## 2025-05-23 NOTE — LETTER
[facility]  [address] [facility] [address]  [phone]  Dept: [phone]    5/28/2025     Patient: Myrna Silvestre   YOB: 1975   Date of Visit: 5/23/2025       To Whom it May Concern:    Myrna Silvestre is under the professional care of [facility]. She was seen in the hospital from 5/23/2025 to 5/28/2025. She is able to return to work with out limitations.     If you have any questions or concerns, please don't hesitate to call.         Sincerely,          Lizandro Hahn

## 2025-05-23 NOTE — ED NOTES
Per [facility] Promise: Active with [facility], ID: [id number]    Nemesio Contreras  Crisis Intervention Specialist II  05/22/25

## 2025-05-23 NOTE — ED NOTES
Met with Mrs. Silvestre to answer some questions  she had about inpatient mental health procedures, particularly related to her phone and the use of a medical julisa she has on it. Suggested she explain to the admitting nurse on the behavioral unit when she arrives that she would like to have access to the sensor on her keychain and the julisa on her phone that she needs to be able to access fairly quickly should she suddenly have symptoms. Also provided her with paper and a pencil to write down phone numbers from her phone, as she was concerned that she wouldn't remember the numbers of family she might wish or need to speak with during her inpatient stay. She had no other questions at this time.

## 2025-05-23 NOTE — EMTALA/ACUTE CARE TRANSFER
[facility] EMERGENCY DEPARTMENT  [address] [facility] [address]  Dept: [phone]      EMTALA TRANSFER CONSENT    NAME Myrna Silvestre                                         1975                              MRN [id number]    I have been informed of my rights regarding examination, treatment, and transfer   by Dr. Emilee Rendon DO    Benefits: Specialized equipment and/or services available at the receiving facility (Include comment)________________________ (Inpatient behavioral health treatment)    Risks: Potential for delay in receiving treatment, Increased discomfort during transfer, Possible worsening of condition or death during transfer      Consent for Transfer:  I acknowledge that my medical condition has been evaluated and explained to me by the emergency department physician or other qualified medical person and/or my attending physician, who has recommended that I be transferred to the service of  Accepting Physician: Dr Luna at Accepting Facility Name, City & State : [facility]. The above potential benefits of such transfer, the potential risks associated with such transfer, and the probable risks of not being transferred have been explained to me, and I fully understand them.  The doctor has explained that, in my case, the benefits of transfer outweigh the risks.  I agree to be transferred.    I authorize the performance of emergency medical procedures and treatments upon me in both transit and upon arrival at the receiving facility.  Additionally, I authorize the release of any and all medical records to the receiving facility and request they be transported with me, if possible.  I understand that the safest mode of transportation during a medical emergency is an ambulance and that the Hospital advocates the use of this mode of transport. Risks of traveling to the receiving facility by car, including absence of medical control, life sustaining equipment, such as  oxygen, and medical personnel has been explained to me and I fully understand them.    (JULIANNA CORRECT BOX BELOW)  []  I consent to the stated transfer and to be transported by ambulance/helicopter.  [  ]  I consent to the stated transfer, but refuse transportation by ambulance and accept full responsibility for my transportation by car.  I understand the risks of non-ambulance transfers and I exonerate the Hospital and its staff from any deterioration in my condition that results from this refusal.    X___________________________________________    DATE  25  TIME________  Signature of patient or legally responsible individual signing on patient behalf           RELATIONSHIP TO PATIENT_________________________          Provider Certification    NAME Myrna Silvestre                                         1975                              MRN [id number]    A medical screening exam was performed on the above named patient.  Based on the examination:    Condition Necessitating Transfer The primary encounter diagnosis was Depression. Diagnoses of Lightheadedness, Chest pain, unspecified, Hallucinations, Pulmonary embolism, other, unspecified chronicity, unspecified whether acute cor pulmonale present (HCC), IFG (impaired fasting glucose), and Thrombocytosis were also pertinent to this visit.    Patient Condition: The patient has been stabilized such that within reasonable medical probability, no material deterioration of the patient condition or the condition of the unborn child(pee) is likely to result from the transfer    Reason for Transfer: Level of Care needed not available at this facility (Inpatient behavioral health treatment)    Transfer Requirements: Facility [facility]   Space available and qualified personnel available for treatment as acknowledged by [facility]  Agreed to accept transfer and to provide appropriate medical treatment as acknowledged by       Dr Luna  Appropriate medical records  of the examination and treatment of the patient are provided at the time of transfer   STAFF INITIAL WHEN COMPLETED _______  Transfer will be performed by qualified personnel from [facility]  and appropriate transfer equipment as required, including the use of necessary and appropriate life support measures.    Provider Certification: I have examined the patient and explained the following risks and benefits of being transferred/refusing transfer to the patient/family:  General risk, such as traffic hazards, adverse weather conditions, rough terrain or turbulence, possible failure of equipment (including vehicle or aircraft), or consequences of actions of persons outside the control of the transport personnel, The patient is stable for psychiatric transfer because they are medically stable, and is protected from harming him/herself or others during transport      Based on these reasonable risks and benefits to the patient and/or the unborn child(pee), and based upon the information available at the time of the patient’s examination, I certify that the medical benefits reasonably to be expected from the provision of appropriate medical treatments at another medical facility outweigh the increasing risks, if any, to the individual’s medical condition, and in the case of labor to the unborn child, from effecting the transfer.    X____________________________________________ DATE 05/23/25        TIME_______      ORIGINAL - SEND TO MEDICAL RECORDS   COPY - SEND WITH PATIENT DURING TRANSFER

## 2025-05-23 NOTE — ED NOTES
Spoke with patient who says she has a heart monitor via julisa that logs arrhythmia and palpitations.  She says it is internal and she sends information on how she is feeling so she doesnt black out.

## 2025-05-23 NOTE — ED NOTES
"Patient presents to the Emergency Department at the recommendation of her therapist from [facility]. Patient is calm and cooperative upon approach, and agrees to speak with this writer. Patient is alert and oriented across all spheres, has an even affect, speaks logically and coherently and is in a depressed and anxious mood. Patient reports that she was with her therapist today and disclosed increasing auditory and visual hallucinations, as well as debilitating depression and anxiety. Patient reports losing some very supportive and close people in her life lately, and that since the losses she has been experiencing increasing auditory and visual hallucinations. Patient reports the auditory hallucinations manifest as the voices of those who have passed away, but they tell her positive things like \"You'll get through this\", denies they are command in nature or say negative things. Patient reports the visual hallucinations manifest as the images of those who have passed away. Patient reports depression and anxiety that have gotten so bad that she barely leaves the house, and when she does it can only be for very short times before she is fully in a panic attack. Patient expresses anhedonia and helplessness. Patient denies suicidal ideation, homicidal ideation and tactile hallucinations. Patient reports a decreased appetite, and finds it increasingly difficult to stay asleep. Patient reports her dreams are filled with those who have passed away. Patient reports she is medication compliant, and has a psychiatrist and therapist through [facility]. Patient denies drug, alcohol and tobacco consumption.    Patient presents with an email from her therapist that is recommending inpatient psychiatric treatment at this time, and the patient would benefit from such. Patient reports she is willing to sign herself into the hospital for inpatient psychiatric treatment at this time. No indication for 302.    Nemesio Barrios "  II  05/22/25

## 2025-05-23 NOTE — ED CARE HANDOFF
Emergency Department Sign Out Note        Sign out and transfer of care from Dr. Moya. See Separate Emergency Department note.     The patient, Myrna Silvestre, was evaluated by the previous provider for severe depression.    Workup Completed:  Labs Reviewed   CBC AND DIFFERENTIAL - Abnormal       Result Value Ref Range Status    WBC 7.99  4.31 - 10.16 Thousand/uL Final    RBC 3.94  3.81 - 5.12 Million/uL Final    Hemoglobin 10.7 (*) 11.5 - 15.4 g/dL Final    Hematocrit 34.4 (*) 34.8 - 46.1 % Final    MCV 87  82 - 98 fL Final    MCH 27.2  26.8 - 34.3 pg Final    MCHC 31.1 (*) 31.4 - 37.4 g/dL Final    RDW 14.5  11.6 - 15.1 % Final    MPV 10.0  8.9 - 12.7 fL Final    Platelets 393 (*) 149 - 390 Thousands/uL Final    nRBC 0  /100 WBCs Final    Segmented % 73  43 - 75 % Final    Immature Grans % 0  0 - 2 % Final    Lymphocytes % 18  14 - 44 % Final    Monocytes % 7  4 - 12 % Final    Eosinophils Relative 1  0 - 6 % Final    Basophils Relative 1  0 - 1 % Final    Absolute Neutrophils 5.85  1.85 - 7.62 Thousands/µL Final    Absolute Immature Grans 0.02  0.00 - 0.20 Thousand/uL Final    Absolute Lymphocytes 1.46  0.60 - 4.47 Thousands/µL Final    Absolute Monocytes 0.56  0.17 - 1.22 Thousand/µL Final    Eosinophils Absolute 0.05  0.00 - 0.61 Thousand/µL Final    Basophils Absolute 0.05  0.00 - 0.10 Thousands/µL Final   COMPREHENSIVE METABOLIC PANEL - Abnormal    Sodium 137  135 - 147 mmol/L Final    Potassium 3.5  3.5 - 5.3 mmol/L Final    Chloride 102  96 - 108 mmol/L Final    CO2 27  21 - 32 mmol/L Final    ANION GAP 8  4 - 13 mmol/L Final    BUN 12  5 - 25 mg/dL Final    Creatinine 0.58 (*) 0.60 - 1.30 mg/dL Final    Comment: Standardized to IDMS reference method    Glucose 90  65 - 140 mg/dL Final    Comment: If the patient is fasting, the ADA then defines impaired fasting glucose as > 100 mg/dL and diabetes as > or equal to 123 mg/dL.    Calcium 9.5  8.4 - 10.2 mg/dL Final    AST 10 (*) 13 - 39 U/L Final    ALT 9  7  - 52 U/L Final    Comment: Specimen collection should occur prior to Sulfasalazine administration due to the potential for falsely depressed results.     Alkaline Phosphatase 51  34 - 104 U/L Final    Total Protein 8.1  6.4 - 8.4 g/dL Final    Albumin 4.4  3.5 - 5.0 g/dL Final    Total Bilirubin 0.23  0.20 - 1.00 mg/dL Final    Comment: Use of this assay is not recommended for patients undergoing treatment with eltrombopag due to the potential for falsely elevated results.  N-acetyl-p-benzoquinone imine (metabolite of Acetaminophen) will generate erroneously low results in samples for patients that have taken an overdose of Acetaminophen.    eGFR 108  ml/min/1.73sq m Final    Narrative:     National Kidney Disease Foundation guidelines for Chronic Kidney Disease (CKD):     Stage 1 with normal or high GFR (GFR > 90 mL/min/1.73 square meters)    Stage 2 Mild CKD (GFR = 60-89 mL/min/1.73 square meters)    Stage 3A Moderate CKD (GFR = 45-59 mL/min/1.73 square meters)    Stage 3B Moderate CKD (GFR = 30-44 mL/min/1.73 square meters)    Stage 4 Severe CKD (GFR = 15-29 mL/min/1.73 square meters)    Stage 5 End Stage CKD (GFR <15 mL/min/1.73 square meters)  Note: GFR calculation is accurate only with a steady state creatinine   D-DIMER, QUANTITATIVE - Abnormal    D-Dimer, Quant 1.74 (*) <0.50 ug/ml FEU Final    Comment: Reference and upper limits to exclude DVT and PE are the same.  Do not use to exclude if clinical symptoms are present.  Pregnant women:  1st trimester:  <0.22 - 1.06 ug/ml FEU  2nd trimester:  <0.22 - 1.88 ug/ml FEU  3rd trimester:   0.24 - 3.28 ug/ml FEU    Note: Normal ranges may not apply to patients who are transgender, non-binary, or whose legal sex, sex at birth, and gender identity differ.     URINE MACROSCOPIC, POC - Abnormal    Color, UA Yellow   Final    Clarity, UA Clear   Final    pH, UA 5.5  4.5 - 8.0 Final    Leukocytes, UA Negative  Negative Final    Nitrite, UA Negative  Negative Final     "Protein, UA Negative  Negative mg/dl Final    Glucose, UA Negative  Negative mg/dl Final    Ketones, UA Negative  Negative mg/dl Final    Urobilinogen, UA 0.2  0.2, 1.0 E.U./dl E.U./dl Final    Bilirubin, UA Negative  Negative Final    Occult Blood, UA Small (*) Negative Final    Specific Gravity, UA 1.020  1.003 - 1.030 Final    Narrative:     CLINITEK RESULT   HS TROPONIN I 0HR - Normal    hs TnI 0hr <2  \"Refer to ACS Flowchart\"- see link ng/L Final    Comment:                                              Initial (time 0) result  If >=50 ng/L, Myocardial injury suggested ;  Type of myocardial injury and treatment strategy  to be determined.  If 5-49 ng/L, a delta result at 2 hours will be needed to further evaluate.  If <4 ng/L, and chest pain has been >3 hours since onset, patient may qualify for discharge based on the HEART score in the ED.  If <5 ng/L and <3hours since onset of chest pain, a delta result at 2 hours will be needed to further evaluate.    HS Troponin 99th Percentile URL of a Health Population=12 ng/L with a 95% Confidence Interval of 8-18 ng/L.    Second Troponin (time 2 hours)  If calculated delta >= 20 ng/L,  Myocardial injury suggested ; Type of myocardial injury and treatment strategy to be determined.  If 5-49 ng/L and the calculated delta is 5-19 ng/L, consult medical service for evaluation.  Continue evaluation for ischemia on ecg and other possible etiology and repeat hs troponin at 4 hours.  If delta is <5 ng/L at 2 hours, consider discharge based on risk stratification via the HEART score (if in ED), or REBECCA risk score in IP/Observation.    HS Troponin 99th Percentile URL of a Health Population=12 ng/L with a 95% Confidence Interval of 8-18 ng/L.   RAPID DRUG SCREEN, URINE - Normal    Amph/Meth UR Negative  Negative Final    Barbiturate Ur Negative  Negative Final    Benzodiazepine Urine Negative  Negative Final    Cocaine Urine Negative  Negative Final    Methadone Urine Negative  " Negative Final    Opiate Urine Negative  Negative Final    PCP Ur Negative  Negative Final    THC Urine Negative  Negative Final    Oxycodone Urine Negative  Negative Final    Fentanyl Urine Negative  Negative Final    HYDROCODONE URINE Negative  Negative Final    Narrative:     FOR MEDICAL PURPOSES ONLY.   IF CONFIRMATION NEEDED PLEASE CONTACT THE LAB WITHIN 5 DAYS.    Drug Screen Cutoff Levels:  AMPHETAMINE/METHAMPHETAMINES  1000 ng/mL  BARBITURATES     200 ng/mL  BENZODIAZEPINES     200 ng/mL  COCAINE      300 ng/mL  METHADONE      300 ng/mL  OPIATES      300 ng/mL  PHENCYCLIDINE     25 ng/mL  THC       50 ng/mL  OXYCODONE      100 ng/mL  FENTANYL      5 ng/mL  HYDROCODONE     300 ng/mL   TSH, 3RD GENERATION WITH FREE T4 REFLEX - Normal    TSH 3RD GENERATON 4.495  0.450 - 4.500 uIU/mL Final    Comment: The recommended reference ranges for TSH during pregnancy are as follows:   First trimester 0.100 to 2.500 uIU/mL   Second trimester  0.200 to 3.000 uIU/mL   Third trimester 0.300 to 3.000 uIU/m    Note: Normal ranges may not apply to patients who are transgender, non-binary, or whose legal sex, sex at birth, and gender identity differ.  Adult TSH (3rd generation) reference range follows the recommended guidelines of the American Thyroid Association, January, 2020.   MEDICAL ALCOHOL - Normal    Ethanol Lvl <10  <10 mg/dL Final   URINE MICROSCOPIC - Normal    RBC, UA 1-2  None Seen, 1-2 /hpf Final    WBC, UA 1-2  None Seen, 1-2 /hpf Final    Epithelial Cells Occasional  None Seen, Occasional /hpf Final    Bacteria, UA Occasional  None Seen, Occasional /hpf Final     CTA chest pe study   Final Result      No acute pulmonary embolism or thoracic aortic aneurysm/dissection. No acute intrathoracic abnormalities. A nonspecific subpleural 6 mm nodule in the right lower lobe is seen. Recommend follow-up CT chest in 6 to 12 months and 18 to 24 months per current    Fleischner Society guidelines.         The study was marked  in EPIC for immediate notification.         Computerized Assisted Algorithm (CAA) may have aided analysis of applicable images.      Workstation performed: [id number]               ED Course / Workup Pending (followup):    No data recorded                          ED Course as of 05/23/25 1607   Fri May 23, 2025   0631 Assumed care from Dr. Moya    Worsening depression / anxiety, hallucinatioins  201 signed, bed search     Procedures  Medical Decision Making  Amount and/or Complexity of Data Reviewed  Labs: ordered.  Radiology: ordered.    Risk  Prescription drug management.  Decision regarding hospitalization.            Disposition  Final diagnoses:   Lightheadedness   Chest pain, unspecified   Depression   Hallucinations     Time reflects when diagnosis was documented in both MDM as applicable and the Disposition within this note       Time User Action Codes Description Comment    5/22/2025  9:50 PM SwMark keen Add [R42] Lightheadedness     5/22/2025  9:50 PM Mark Camarena Add [R07.9] Chest pain, unspecified     5/22/2025  9:51 PM Mark Camarena Add [F32.A] Depression     5/23/2025  6:31 AM NakitakyEmilee Modify [R42] Lightheadedness     5/23/2025  6:31 AM NakitakyEmilee Modify [F32.A] Depression     5/23/2025  6:31 AM NakitakyEmilee Add [R44.3] Hallucinations     5/23/2025  1:55 PM Augustin Solano [I26.99] Pulmonary embolism, other, unspecified chronicity, unspecified whether acute cor pulmonale present (HCC)     5/23/2025  1:55 PM Augustin Solano [R73.01] IFG (impaired fasting glucose)     5/23/2025  1:55 PM Augustin Solano [D75.839] Thrombocytosis           ED Disposition       ED Disposition   Transfer to Behavioral Health Condition   --    Date/Time   Thu May 22, 2025  9:50 PM    Comment   Myrna Silvestre should be transferred out to  and has been medically cleared.               MD Documentation      Flowsheet Row Most Recent Value   Patient Condition The patient  has been stabilized such that within reasonable medical probability, no material deterioration of the patient condition or the condition of the unborn child(pee) is likely to result from the transfer   Reason for Transfer Level of Care needed not available at this facility  [Inpatient behavioral health treatment]   Benefits of Transfer Specialized equipment and/or services available at the receiving facility (Include comment)________________________  [Inpatient behavioral health treatment]   Risks of Transfer Potential for delay in receiving treatment, Increased discomfort during transfer, Possible worsening of condition or death during transfer   Accepting Physician Dr Luna   Accepting Facility Name, [facility]    (Name & Tel number) Roundtrip   Transported by (Company and Unit #) [facility]   Sending MD Dr Rendon   Provider Certification General risk, such as traffic hazards, adverse weather conditions, rough terrain or turbulence, possible failure of equipment (including vehicle or aircraft), or consequences of actions of persons outside the control of the transport personnel, The patient is stable for psychiatric transfer because they are medically stable, and is protected from harming him/herself or others during transport          RN Documentation      Flowsheet Row Most Recent Value   Accepting Facility Name, [facility]    (Name & Tel number) Roundtrip   Transported by (Company and Unit #) [facility]          Follow-up Information    None       Discharge Medication List as of 5/23/2025  2:59 PM        CONTINUE these medications which have NOT CHANGED    Details   Abatacept (Orencia ClickJect) 125 MG/ML SOAJ Inject 125 mg under the skin, Starting Mon 11/20/2023, Until Thu 10/31/2024 at 2359, Historical Med      albuterol (PROVENTIL HFA,VENTOLIN HFA) 90 mcg/act inhaler Inhale 2 puffs every 6 (six) hours as needed for wheezing, Historical Med       Ascorbic Acid (vitamin C) 100 MG tablet Take 100 mg by mouth in the morning., Historical Med      b complex vitamins capsule Take 1 capsule by mouth in the morning, Starting Sat 10/26/2024, Historical Med      buPROPion (WELLBUTRIN SR) 150 mg 12 hr tablet Take 150 mg by mouth in the morning, Starting Thu 10/5/2023, Historical Med      busPIRone (BUSPAR) 15 mg tablet Take 15 mg by mouth 2 (two) times a day, Starting Tue 10/17/2023, Historical Med      dicyclomine (BENTYL) 20 mg tablet Take 1 tablet (20 mg total) by mouth every 6 (six) hours as needed (diarrhea, abdominal cramps), Starting Sat 7/1/2023, Normal      DULoxetine (CYMBALTA) 60 mg delayed release capsule Take 60 mg by mouth daily, Starting Mon 4/3/2023, Until Thu 10/31/2024, Historical Med      Eliquis 5 MG Take 5 mg by mouth in the morning and 5 mg in the evening., Starting Sun 6/5/2022, Historical Med      Ergocalciferol (VITAMIN D2 PO) Take 50,000 Units by mouth once a week, Starting Mon 3/16/2015, Historical Med      ezetimibe (ZETIA) 10 mg tablet Take 10 mg by mouth in the morning., Starting Thu 4/25/2024, Until Fri 4/25/2025, Historical Med      FeroSul 325 (65 Fe) MG tablet Take 1 tablet by mouth daily with breakfast, Starting Wed 9/15/2021, Historical Med      ferrous sulfate 324 (65 Fe) mg Take 1 tablet (324 mg total) by mouth 2 (two) times a day, Starting Thu 4/10/2025, Normal      folic acid (FOLVITE) 1 mg tablet TAKE 1 TABLET(1 MG) BY MOUTH DAILY, Starting Thu 4/10/2025, Normal      !! levothyroxine 137 mcg tablet Take 137 mcg by mouth see administration instructions Once daily on SATURDAYS and SUNDAYS only, Historical Med      !! levothyroxine 150 mcg tablet Take 150 mcg by mouth see administration instructions Daily MONDAY through FRIDAY, Historical Med      Linzess 145 MCG CAPS Take 145 mcg by mouth in the morning., Starting Wed 10/9/2024, Historical Med      lubiprostone (AMITIZA) 24 mcg capsule TAKE 1 CAPSULE (24 MCG TOTAL) BY MOUTH 2  (TWO) TIMES A DAY WITH MEALS, Historical Med      meclizine (ANTIVERT) 12.5 MG tablet Take 12.5 mg by mouth daily as needed for dizziness or nausea, Historical Med      metFORMIN (GLUCOPHAGE-XR) 750 mg 24 hr tablet Take 750 mg by mouth daily with breakfast, Starting Tue 10/17/2023, Until Fri 5/23/2025, Historical Med      Methotrexate Sodium (methotrexate, PF,) 250 MG/10ML injection Inject 25 mg under the skin once a week, Starting Fri 9/8/2023, Until Thu 4/3/2025, Historical Med      metoprolol succinate (TOPROL-XL) 100 mg 24 hr tablet Take 100 mg by mouth in the morning., Historical Med      metoprolol tartrate (LOPRESSOR) 25 mg tablet Take 25 mg by mouth 2 (two) times a day, Starting Wed 4/12/2023, Until Thu 4/11/2024, Historical Med      metroNIDAZOLE (METROGEL) 0.75 % gel Apply topically in the morning, Starting Thu 10/31/2024, Normal      Mirabegron ER (Myrbetriq) 50 MG TB24 Take 50 mg by mouth daily, Starting Thu 11/16/2023, Until Fri 11/15/2024, Historical Med      norethindrone (Aygestin) 5 mg tablet Take 1 tablet (5 mg total) by mouth daily, Starting Thu 12/12/2024, Normal      ondansetron (ZOFRAN-ODT) 4 mg disintegrating tablet Take 1 tablet (4 mg total) by mouth every 6 (six) hours as needed for nausea, Starting Sat 7/1/2023, Normal      pantoprazole (PROTONIX) 40 mg tablet Take 1 tablet by mouth in the morning and 1 tablet in the evening., Starting Wed 5/8/2024, Historical Med      predniSONE 10 mg tablet PLEASE TAKE 1 TABLET BY MOUTH DAILY UNTIL YOUR NEXT VISIT, Historical Med      rosuvastatin (CRESTOR) 40 MG tablet Take 40 mg by mouth daily, Starting Thu 7/13/2023, Historical Med      Spiriva Respimat 2.5 MCG/ACT AERS inhaler Inhale 2 puffs in the morning., Historical Med      spironolactone (ALDACTONE) 50 mg tablet Take 50 mg by mouth in the morning., Starting Tue 3/4/2014, Historical Med      SUMAtriptan (IMITREX) 50 mg tablet Take 50 mg by mouth, Starting Tue 4/11/2017, Historical Med     "  Syringe/Needle, Disp, 27G X 1/2\" 1 ML MISC As directed with methotrexate use, Starting Fri 6/21/2024, Historical Med      tolterodine (DETROL LA) 4 mg 24 hr capsule Take 4 mg by mouth in the morning., Starting Thu 9/12/2024, Until Fri 9/12/2025, Historical Med      vitamin A 3 MG (39077 UT) capsule Take 10,000 Units by mouth in the morning., Historical Med      vitamin E, tocopherol, 400 units capsule Take 400 Units by mouth in the morning., Historical Med      Wegovy 1 MG/0.5ML Inject 1 mg under the skin Once a week, Starting Thu 8/8/2024, Historical Med       !! - Potential duplicate medications found. Please discuss with provider.        No discharge procedures on file.       ED Provider  Electronically Signed by     Emilee Rendon DO  05/23/25 7552    "

## 2025-05-23 NOTE — ED NOTES
Pt moved to ED 23 at this time, report given to incoming RN     Son López, FLORENCE  05/22/25 [id number]

## 2025-05-23 NOTE — ED NOTES
Patient is accepted at [facility]  Patient is accepted by Dr. Cheryl Cantrell    Transportation is arranged with [facility]  Transportation is scheduled for TBD    Nurse report is to be called to [phone]   prior to patient transfer.

## 2025-05-23 NOTE — ED NOTES
After speaking with J. Reyer, patient to be reviewed by treatment team in the morning for consideration for an older adult bed.    Nemesio Contreras  Crisis Intervention Specialist II  05/22/25

## 2025-05-23 NOTE — NURSING NOTE
Pt admitted to Rm 208-1 from [facility] with diagnosis of MDD. Pt was having increase depression & anxiety; & auditory & visual hallucinations from recent losses in her life. Pt denies any hallucinations @ present. Pt denies any suicidal or homicidal ideations. Pt oriented to Unit & her room; & made comfortable. Pt c/o moderate depression & anxiety @ present. Pt is pleasant & cooperative. Pt up ad merry & gait is steady. Q 15 min checks maintained to monitor pt's behavior & safety.

## 2025-05-24 PROBLEM — F43.21 COMPLICATED GRIEF: Status: ACTIVE | Noted: 2025-05-24

## 2025-05-24 PROBLEM — F32.3 MDD (MAJOR DEPRESSIVE DISORDER), SINGLE EPISODE, SEVERE WITH PSYCHOTIC FEATURES (HCC): Status: ACTIVE | Noted: 2025-05-24

## 2025-05-24 LAB
25(OH)D3 SERPL-MCNC: 24.7 NG/ML (ref 30–100)
CHOLEST SERPL-MCNC: 170 MG/DL (ref ?–200)
EST. AVERAGE GLUCOSE BLD GHB EST-MCNC: 111 MG/DL
FOLATE SERPL-MCNC: 14.8 NG/ML
HBA1C MFR BLD: 5.5 %
HDLC SERPL-MCNC: 53 MG/DL
LDLC SERPL CALC-MCNC: 106 MG/DL (ref 0–100)
NONHDLC SERPL-MCNC: 117 MG/DL
TRIGL SERPL-MCNC: 55 MG/DL (ref ?–150)
VIT B12 SERPL-MCNC: 245 PG/ML (ref 180–914)

## 2025-05-24 PROCEDURE — 82607 VITAMIN B-12: CPT

## 2025-05-24 PROCEDURE — 82306 VITAMIN D 25 HYDROXY: CPT

## 2025-05-24 PROCEDURE — 83036 HEMOGLOBIN GLYCOSYLATED A1C: CPT

## 2025-05-24 PROCEDURE — 80061 LIPID PANEL: CPT

## 2025-05-24 PROCEDURE — 82746 ASSAY OF FOLIC ACID SERUM: CPT

## 2025-05-24 PROCEDURE — 99223 1ST HOSP IP/OBS HIGH 75: CPT

## 2025-05-24 RX ORDER — FLUTICASONE PROPIONATE 50 MCG
1 SPRAY, SUSPENSION (ML) NASAL DAILY
Status: DISCONTINUED | OUTPATIENT
Start: 2025-05-24 | End: 2025-05-28 | Stop reason: HOSPADM

## 2025-05-24 RX ORDER — HYDROXYCHLOROQUINE SULFATE 200 MG/1
400 TABLET, FILM COATED ORAL
Status: DISCONTINUED | OUTPATIENT
Start: 2025-05-25 | End: 2025-05-28 | Stop reason: HOSPADM

## 2025-05-24 RX ORDER — BUDESONIDE AND FORMOTEROL FUMARATE DIHYDRATE 160; 4.5 UG/1; UG/1
2 AEROSOL RESPIRATORY (INHALATION) 2 TIMES DAILY
Status: DISCONTINUED | OUTPATIENT
Start: 2025-05-24 | End: 2025-05-28 | Stop reason: HOSPADM

## 2025-05-24 RX ORDER — DULOXETIN HYDROCHLORIDE 60 MG/1
60 CAPSULE, DELAYED RELEASE ORAL DAILY
Status: DISCONTINUED | OUTPATIENT
Start: 2025-05-25 | End: 2025-05-28 | Stop reason: HOSPADM

## 2025-05-24 RX ORDER — BUPROPION HYDROCHLORIDE 150 MG/1
150 TABLET ORAL DAILY
Status: DISCONTINUED | OUTPATIENT
Start: 2025-05-25 | End: 2025-05-28 | Stop reason: HOSPADM

## 2025-05-24 RX ORDER — ARIPIPRAZOLE 2 MG/1
2 TABLET ORAL DAILY
Status: DISCONTINUED | OUTPATIENT
Start: 2025-05-25 | End: 2025-05-28 | Stop reason: HOSPADM

## 2025-05-24 RX ADMIN — LUBIPROSTONE 24 MCG: 24 CAPSULE, GELATIN COATED ORAL at 08:32

## 2025-05-24 RX ADMIN — Medication 3 MG: at 21:07

## 2025-05-24 RX ADMIN — OXYBUTYNIN 10 MG: 5 TABLET, FILM COATED, EXTENDED RELEASE ORAL at 08:30

## 2025-05-24 RX ADMIN — PANTOPRAZOLE SODIUM 40 MG: 40 TABLET, DELAYED RELEASE ORAL at 08:32

## 2025-05-24 RX ADMIN — BUSPIRONE HYDROCHLORIDE 15 MG: 15 TABLET ORAL at 08:31

## 2025-05-24 RX ADMIN — Medication 10000 UNITS: at 09:00

## 2025-05-24 RX ADMIN — PANTOPRAZOLE SODIUM 40 MG: 40 TABLET, DELAYED RELEASE ORAL at 16:32

## 2025-05-24 RX ADMIN — APIXABAN 5 MG: 5 TABLET, FILM COATED ORAL at 08:32

## 2025-05-24 RX ADMIN — FLUTICASONE PROPIONATE 1 SPRAY: 50 SPRAY, METERED NASAL at 15:17

## 2025-05-24 RX ADMIN — EZETIMIBE 10 MG: 10 TABLET ORAL at 08:32

## 2025-05-24 RX ADMIN — APIXABAN 2.5 MG: 2.5 TABLET, FILM COATED ORAL at 21:07

## 2025-05-24 RX ADMIN — FOLIC ACID 1000 MCG: 1 TABLET ORAL at 08:32

## 2025-05-24 RX ADMIN — FERROUS SULFATE TAB 325 MG (65 MG ELEMENTAL FE) 325 MG: 325 (65 FE) TAB at 08:31

## 2025-05-24 RX ADMIN — UMECLIDINIUM 1 PUFF: 62.5 AEROSOL, POWDER ORAL at 09:00

## 2025-05-24 RX ADMIN — Medication 400 UNITS: at 08:32

## 2025-05-24 RX ADMIN — LEVOTHYROXINE SODIUM 137 MCG: 112 TABLET ORAL at 06:03

## 2025-05-24 RX ADMIN — LUBIPROSTONE 24 MCG: 24 CAPSULE, GELATIN COATED ORAL at 21:07

## 2025-05-24 RX ADMIN — METFORMIN HYDROCHLORIDE 500 MG: 500 TABLET, EXTENDED RELEASE ORAL at 08:31

## 2025-05-24 RX ADMIN — BUSPIRONE HYDROCHLORIDE 15 MG: 15 TABLET ORAL at 17:07

## 2025-05-24 NOTE — H&P
Bran Silvestre#  :1975 F  MRN:[id number]    CSN:[id number]  Adm Date: 2025 1603  4:03 PM   ATT PHY: Shahriar Luna Md  [facility]         Chief Complaint: depression, hallucinations      History of Presenting Illness: Myrna Silvestre is a(n) 49 y.o. year old female who is admitted to [facility] on 201 commitment basis.  Patient originally presented to [facility] ED on  due to worsening depression and hallucinations. It was recommended by her therapist to seek inpatient treatment.     Patient examined at bedside.  Patient denies any current symptoms including chest pain, shortness of breath, dizziness, palpitations.  Patient has frequent dizziness and syncopal episodes that are currently being worked up.  Patient has loop recorder in place and hooked up to her cell phone.     Labs pending.     Allergies[1]    Medications Ordered Prior to Encounter[2]    Active Ambulatory Problems     Diagnosis Date Noted    Acute blood loss anemia 2021    Arachnoid cyst 10/17/2015    Class 1 obesity due to excess calories without serious comorbidity with body mass index (BMI) of 32.0 to 32.9 in adult 2009    Lumbar degenerative disc disease 2017    Family history of ovarian cancer 2016    IFG (impaired fasting glucose) 2021    Migraine 10/30/2009    Rosacea 10/30/2009    Vitamin D deficiency 2010    COVID-19 2022    Dense breasts 10/28/2021    Encounter for weight management 2022    Pulmonary embolus (HCC) 2022    Rheumatoid arthritis of multiple sites with negative rheumatoid factor (HCC) 2021    Thrombocytosis 2022    Menometrorrhagia 2024    Right ovarian cyst 2024    Iron deficiency anemia 04/10/2025     Resolved Ambulatory Problems     Diagnosis Date Noted    Cough 2020     Past Medical History:   Diagnosis Date    Arthritis     Disease of thyroid gland      "Fibromyalgia     Scoliosis        Past Surgical History[3]    Social History: Social History[4]    Family History: Family History[5]    Review of Systems   Constitutional:  Negative for chills, fatigue and fever.   HENT: Negative.     Respiratory:  Negative for shortness of breath.    Cardiovascular:  Negative for chest pain, palpitations and leg swelling.   Gastrointestinal:  Negative for abdominal pain, constipation, diarrhea and nausea.   Genitourinary: Negative.    Musculoskeletal: Negative.    Skin: Negative.    Neurological:  Negative for light-headedness and headaches.       Physical Exam   Vitals: Blood pressure 105/59, pulse 62, temperature 98.1 °F (36.7 °C), temperature source Temporal, resp. rate 17, height 5' 7\" (1.702 m), weight 77.7 kg (171 lb 3.2 oz), SpO2 99%.,Body mass index is 26.81 kg/m².  Constitutional: Awake, alert, in no acute distress.  Head: Normocephalic and atraumatic.   Mouth/Throat: Oropharynx is clear and moist.    Eyes: Conjunctivae and EOM are normal.   Neck: Neck supple. No thyromegaly present.   Cardiovascular: Normal rate, regular rhythm and normal heart sounds.    Pulmonary/Chest: Effort normal and breath sounds normal.   Abdominal: Soft. Bowel sounds are normal. There is no tenderness. There is no rebound and no guarding.   Neurological: No focal deficits.   Musculoskeletal:  Nontender spine.  Skin: Skin is warm and dry. No edema.     Assessment     Myrna Silvestre is a(n) 49 y.o. female with MDD.    Cardiac w/ Hx of DVT/PE, HLD, POTS, syncope, palpitations.  Continue Eliquis 2.5 mg twice daily, Toprol  mg daily, and Zetia 10 mg daily.  Patient has loop recorder in place.  Follows with cardiology outpt.  Meclizine as needed for dizziness.   Asthma.  Continue Symbicort 160/4.5 mcg twice daily and umeclidinium 62.5 mcg/act (Spiriva nonform).  Albuterol as needed.   Allergic rhinitis.  Continue Flonase spray daily.   Rheumatoid arthritis.  Continue Plaquenil 400 mg daily.  " Methotrexate 25 mg/10 mL inj and Orencia 125 mg/mL inj weekly (takes on Wednesdays, last dose 5/21, can have someone bring in from home).  Prediabetes.  Hgb A1c 5.3 on 2/6/25.  Repeat pending.  Home Wegovy 2.4 mg weekly (pt may bring in from home).  Continue Metformin  mg daily (750 nonform).   Hypothyroidism.  TSH WNL.  Continue levothyroxine 150 mcg M-F and 137 mcg Sat/Sun.    Hirsutism.  Continue Spironolactone 50 mg daily  GERD.  Continue Protonix 40 mg daily.   Constipation.  Continue Amitiza 24 mcg twice daily (Linzess nonform).  Bentyl as needed.  OAB, Stress incontinence.  Continue Oxybutynin XL 10 mg daily (tolterodine nonform).  Iron deficiency anemia.  Hgb 10.7 on 5/22.  Continue ferrous sulfate 324 mg daily and folic acid 1 mg daily.  Cont to monitor.   Hx vitamin D deficiency.  Repeat level.   Chronic low back pain, scoliosis of thoracolumbar spine.  Patient follows with ortho outpt.  Tylenol as needed.   MDD.  Managed by psych team.     Prognosis: Fair.    Discharge Plan: In progress.    Advanced Directives: I have discussed in detail with the patient the advanced directives. The patient does not have an appointed as POA or living will. Emergency contact is spouse, Jose M, [phone].  When discussing cardiac and pulmonary resuscitation efforts with the patient, the patient wishes to be  full code.    I have spent more than 50 minutes gathering data, doing physical examination, and discussing the advanced directives, which was witnessed by caring staff.    The patient was discussed with Dr. Zee and he is in agreement with the above note.       [1] No Known Allergies  [2]   Current Facility-Administered Medications on File Prior to Encounter   Medication Dose Route Frequency Provider Last Rate Last Admin    [DISCONTINUED] albuterol (PROVENTIL HFA,VENTOLIN HFA) inhaler 2 puff  2 puff Inhalation Q6H PRN Emilee Rendon,         [DISCONTINUED] apixaban (ELIQUIS) tablet 5 mg  5 mg Oral BID Emilee  JAVON Rendon DO   5 mg at 05/23/25 0903    [DISCONTINUED] busPIRone (BUSPAR) tablet 15 mg  15 mg Oral BID Emilee Rendon DO   15 mg at 05/23/25 0903    [DISCONTINUED] dicyclomine (BENTYL) tablet 20 mg  20 mg Oral Q6H PRN Emilee Rendon DO        [DISCONTINUED] ezetimibe (ZETIA) tablet 10 mg  10 mg Oral Daily Emilee Rendon DO   10 mg at 05/23/25 0903    [DISCONTINUED] ferrous sulfate tablet 325 mg  325 mg Oral Daily With Breakfast Emilee Rendon DO   325 mg at 05/23/25 0903    [DISCONTINUED] folic acid (FOLVITE) tablet 1,000 mcg  1,000 mcg Oral Daily Emilee Rendon DO   1,000 mcg at 05/23/25 0903    [DISCONTINUED] levothyroxine tablet 137 mcg  137 mcg Oral Once per day on Sunday Saturday Emilee Rendon DO        [DISCONTINUED] levothyroxine tablet 150 mcg  150 mcg Oral Once per day on Monday Tuesday Wednesday Thursday Friday Emilee Rendon DO   150 mcg at 05/23/25 1212    [DISCONTINUED] lubiprostone (AMITIZA) capsule 24 mcg  24 mcg Oral BID Emilee Rendon DO   24 mcg at 05/23/25 1003    [DISCONTINUED] meclizine (ANTIVERT) tablet 12.5 mg  12.5 mg Oral Daily PRN Emilee Rendon DO        [DISCONTINUED] metFORMIN (GLUCOPHAGE-XR) 24 hr tablet 750 mg  750 mg Oral Daily With Breakfast Emilee Rendon DO   750 mg at 05/23/25 0903    [DISCONTINUED] metoprolol succinate (TOPROL-XL) 24 hr tablet 100 mg  100 mg Oral Daily Emilee Rendon DO        [DISCONTINUED] ondansetron (ZOFRAN-ODT) dispersible tablet 4 mg  4 mg Oral Q6H PRN Emilee Rendon DO        [DISCONTINUED] oxybutynin (DITROPAN-XL) 24 hr tablet 10 mg  10 mg Oral Daily Emilee Rendon DO   10 mg at 05/23/25 0903    [DISCONTINUED] pantoprazole (PROTONIX) EC tablet 40 mg  40 mg Oral BID With Meals Emilee Rendon DO   40 mg at 05/23/25 0903    [DISCONTINUED] spironolactone (ALDACTONE) tablet 50 mg  50 mg Oral Daily Emilee Rendon DO        [DISCONTINUED] umeclidinium 62.5 mcg/actuation inhaler AEPB 1 puff  1 puff Inhalation Daily  Emilee Rendon DO   1 puff at 05/23/25 1003    [DISCONTINUED] vitamin A capsule 10,000 Units  10,000 Units Oral Daily Emilee Rendon DO        [DISCONTINUED] vitamin E (TOCOPHEROL) capsule 400 Units  400 Units Oral Daily Emilee Rendon DO         Current Outpatient Medications on File Prior to Encounter   Medication Sig Dispense Refill    albuterol (PROVENTIL HFA,VENTOLIN HFA) 90 mcg/act inhaler Inhale 2 puffs every 6 (six) hours as needed for wheezing      Ascorbic Acid (vitamin C) 100 MG tablet Take 100 mg by mouth in the morning.      b complex vitamins capsule Take 1 capsule by mouth in the morning      busPIRone (BUSPAR) 15 mg tablet Take 15 mg by mouth in the morning and 15 mg in the evening.      dicyclomine (BENTYL) 20 mg tablet Take 1 tablet (20 mg total) by mouth every 6 (six) hours as needed (diarrhea, abdominal cramps) 15 tablet 0    Eliquis 5 MG Take 5 mg by mouth in the morning and 5 mg in the evening.      Ergocalciferol (VITAMIN D2 PO) Take 50,000 Units by mouth once a week      ferrous sulfate 324 (65 Fe) mg Take 1 tablet (324 mg total) by mouth 2 (two) times a day 60 tablet 8    folic acid (FOLVITE) 1 mg tablet TAKE 1 TABLET(1 MG) BY MOUTH DAILY 90 tablet 1    levothyroxine 137 mcg tablet Take 137 mcg by mouth see administration instructions Once daily on SATURDAYS and SUNDAYS only      levothyroxine 150 mcg tablet Take 150 mcg by mouth see administration instructions Daily MONDAY through FRIDAY      Linzess 145 MCG CAPS Take 145 mcg by mouth in the morning.      meclizine (ANTIVERT) 12.5 MG tablet Take 12.5 mg by mouth daily as needed for dizziness or nausea      metFORMIN (GLUCOPHAGE-XR) 750 mg 24 hr tablet Take 750 mg by mouth daily with breakfast      metoprolol succinate (TOPROL-XL) 100 mg 24 hr tablet Take 100 mg by mouth in the morning.      ondansetron (ZOFRAN-ODT) 4 mg disintegrating tablet Take 1 tablet (4 mg total) by mouth every 6 (six) hours as needed for nausea 10 tablet 0     pantoprazole (PROTONIX) 40 mg tablet Take 1 tablet by mouth in the morning and 1 tablet in the evening.      Spiriva Respimat 2.5 MCG/ACT AERS inhaler Inhale 2 puffs in the morning.      spironolactone (ALDACTONE) 50 mg tablet Take 50 mg by mouth in the morning.      tolterodine (DETROL LA) 4 mg 24 hr capsule Take 4 mg by mouth in the morning.      vitamin A 3 MG (39429 UT) capsule Take 10,000 Units by mouth in the morning.      vitamin E, tocopherol, 400 units capsule Take 400 Units by mouth in the morning.      Wegovy 1 MG/0.5ML Inject 1 mg under the skin Once a week      Abatacept (Orencia ClickJect) 125 MG/ML SOAJ Inject 125 mg under the skin      buPROPion (WELLBUTRIN SR) 150 mg 12 hr tablet Take 150 mg by mouth in the morning (Patient not taking: Reported on 5/23/2025)      DULoxetine (CYMBALTA) 60 mg delayed release capsule Take 60 mg by mouth daily (Patient not taking: Reported on 4/3/2025)      ezetimibe (ZETIA) 10 mg tablet Take 10 mg by mouth in the morning.      FeroSul 325 (65 Fe) MG tablet Take 1 tablet by mouth daily with breakfast      lubiprostone (AMITIZA) 24 mcg capsule TAKE 1 CAPSULE (24 MCG TOTAL) BY MOUTH 2 (TWO) TIMES A DAY WITH MEALS (Patient not taking: Reported on 10/31/2024)      Methotrexate Sodium (methotrexate, PF,) 250 MG/10ML injection Inject 25 mg under the skin once a week      metoprolol tartrate (LOPRESSOR) 25 mg tablet Take 25 mg by mouth 2 (two) times a day (Patient not taking: Reported on 10/31/2024)      metroNIDAZOLE (METROGEL) 0.75 % gel Apply topically in the morning (Patient not taking: Reported on 5/23/2025) 45 g 1    Mirabegron ER (Myrbetriq) 50 MG TB24 Take 50 mg by mouth daily      norethindrone (Aygestin) 5 mg tablet Take 1 tablet (5 mg total) by mouth daily (Patient not taking: Reported on 5/23/2025) 30 tablet 1    predniSONE 10 mg tablet PLEASE TAKE 1 TABLET BY MOUTH DAILY UNTIL YOUR NEXT VISIT (Patient not taking: No sig reported)      rosuvastatin (CRESTOR) 40 MG  "tablet Take 40 mg by mouth daily (Patient not taking: Reported on 2025)      SUMAtriptan (IMITREX) 50 mg tablet Take 50 mg by mouth (Patient not taking: Reported on 2022)      Syringe/Needle, Disp, 27G X 1/2\" 1 ML MISC As directed with methotrexate use (Patient not taking: Reported on 2025)     [3]   Past Surgical History:  Procedure Laterality Date     SECTION      CHOLECYSTECTOMY     [4]   Social History  Socioeconomic History    Marital status: /Civil Union   Tobacco Use    Smoking status: Never    Smokeless tobacco: Never   Vaping Use    Vaping status: Never Used   Substance and Sexual Activity    Alcohol use: No    Drug use: No    Sexual activity: Yes     Partners: Male     Birth control/protection: Female Sterilization     Social Drivers of Health     Financial Resource Strain: Low Risk  (2023)    Received from [facility]    Overall Financial Resource Strain (CARDIA)     Difficulty of Paying Living Expenses: Not hard at all   Food Insecurity: No Food Insecurity (2025)    Nursing - Inadequate Food Risk Classification     Ran Out of Food in the Last Year: Never true   Transportation Needs: No Transportation Needs (2025)    Nursing - Transportation Risk Classification     Lack of Transportation: No    Received from [facility]   Intimate Partner Violence: Unknown (2025)    Nursing IPS     Physically Hurt by Someone: No     Hurt or Threatened by Someone: No   Housing Stability: Unknown (2025)    Nursing: Inadequate Housing Risk Classification     Unable to Pay for Housing in the Last Year: No     Has Housin   [5]   Family History  Problem Relation Name Age of Onset    Cancer Mother          ovary    Diabetes Mother      Cancer Maternal Aunt          ovary    Leukemia Maternal Aunt      Heart disease Neg Hx       "

## 2025-05-24 NOTE — NURSING NOTE
Pt was visible on the milieu, withdrawn to self. Pt endorsed moderate anxiety and depression. Denied all other psych symptoms. Pt was pleasant, cooperative, and med compliant. Pt stated they only took eliquis 2.5mg BID and not 5mg, requested to take half of the 5mg. Pt also stated they no long take Amitiza and are instead taking Linzess as needed. Will CTM. Q15 minute pt safety checks ongoing.

## 2025-05-24 NOTE — NURSING NOTE
Pt is quiet & brightens upon approach. Pt is cooperative & compliant with medications. Pt socializes minimally with other patients. Pt up ad merry & gait is steady. Pt c/o mild depression & mild anxiety. Pt denies any hallucinations, suicidal or homicidal ideations. Q 15 min checks maintained to monitor pt's behavior & safety.

## 2025-05-24 NOTE — ASSESSMENT & PLAN NOTE
See above    What Type Of Note Output Would You Prefer (Optional)?: Standard Output How Severe Is Your Skin Lesion?: mild Has Your Skin Lesion Been Treated?: not been treated Is This A New Presentation, Or A Follow-Up?: Skin Lesion
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
## 2025-05-24 NOTE — ASSESSMENT & PLAN NOTE
Resume home Wellbutrin  mg daily  Resume home Buspar 15 mg BID   Resume home Cymbalta 60 mg daily  Start Abilify 2 mg daily for psychotic symptoms

## 2025-05-24 NOTE — TREATMENT PLAN
TREATMENT PLAN REVIEW - Behavioral Health Myrna Silvestre 49 y.o. 1975 female MRN: [id number]    [facility] Room / Bed: [facility] 208/[facility] 208-01 Encounter: [id number]          Admit Date/Time:  5/23/2025  4:03 PM    Treatment Team:   MD Jose Patterson MD Isabelle Kenna Debra Malik Gieniec, RN    Diagnosis: Principal Problem:    MDD (major depressive disorder), single episode, severe with psychotic features (HCC)  Active Problems:    Complicated grief      Patient Strengths/Assets: average or above intelligence, cooperative, communication skills, compliant with medication, good insight, sense of humor, supportive family, supportive friends    Patient Barriers/Limitations: recent loss of loved ones     Short Term Goals: decrease in depressive symptoms, decrease in anxiety symptoms, decrease in psychotic symptoms, decrease in suicidal thoughts, ability to stay safe on the unit, improvement in ability to express basic needs, improvement in insight, improvement in reality testing, improvement in reasoning ability, improvement in self care, sleep improvement, improvement in appetite, mood stabilization, increase in group attendance, increase in socialization with peers on the unit, acceptance of need for psychiatric treatment, acceptance of psychiatric medications    Long Term Goals: improvement in depression, improvement in anxiety, stabilization of mood, free of suicidal thoughts, improvement in reality testing, improved insight, able to express basic needs, acceptance of need for psychiatric medications, acceptance of need for psychiatric treatment, acceptance of need for psychiatric follow up after discharge, acceptance of psychiatric diagnosis, adequate self care, adequate sleep, adequate appetite, adequate oral intake, appropriate interaction with peers, appropriate interaction with family    Progress Towards Goals: starting psychiatric medications as  prescribed    Recommended Treatment: medication management, patient medication education, group therapy, milieu therapy, continued Behavioral Health psychiatric evaluation/assessment process    Treatment Frequency: daily medication monitoring, group and milieu therapy daily, monitoring through interdisciplinary rounds, monitoring through weekly patient care conferences    Expected Discharge Date:  TBD    Discharge Plan: discharge to home    Treatment Plan Created/Updated By: Carlos Harrison MD

## 2025-05-25 PROCEDURE — 99232 SBSQ HOSP IP/OBS MODERATE 35: CPT

## 2025-05-25 RX ORDER — ERGOCALCIFEROL 1.25 MG/1
50000 CAPSULE, LIQUID FILLED ORAL WEEKLY
Status: DISCONTINUED | OUTPATIENT
Start: 2025-05-25 | End: 2025-05-28 | Stop reason: HOSPADM

## 2025-05-25 RX ADMIN — Medication 10000 UNITS: at 08:52

## 2025-05-25 RX ADMIN — SPIRONOLACTONE 50 MG: 50 TABLET ORAL at 08:52

## 2025-05-25 RX ADMIN — CYANOCOBALAMIN TAB 500 MCG 500 MCG: 500 TAB at 12:02

## 2025-05-25 RX ADMIN — UMECLIDINIUM 1 PUFF: 62.5 AEROSOL, POWDER ORAL at 08:52

## 2025-05-25 RX ADMIN — APIXABAN 2.5 MG: 2.5 TABLET, FILM COATED ORAL at 08:52

## 2025-05-25 RX ADMIN — METOPROLOL SUCCINATE 100 MG: 50 TABLET, EXTENDED RELEASE ORAL at 08:49

## 2025-05-25 RX ADMIN — FOLIC ACID 1000 MCG: 1 TABLET ORAL at 08:51

## 2025-05-25 RX ADMIN — FERROUS SULFATE TAB 325 MG (65 MG ELEMENTAL FE) 325 MG: 325 (65 FE) TAB at 08:51

## 2025-05-25 RX ADMIN — BUDESONIDE AND FORMOTEROL FUMARATE DIHYDRATE 2 PUFF: 160; 4.5 AEROSOL RESPIRATORY (INHALATION) at 17:08

## 2025-05-25 RX ADMIN — LEVOTHYROXINE SODIUM 137 MCG: 112 TABLET ORAL at 05:46

## 2025-05-25 RX ADMIN — OXYBUTYNIN 10 MG: 5 TABLET, FILM COATED, EXTENDED RELEASE ORAL at 08:49

## 2025-05-25 RX ADMIN — BUPROPION HYDROCHLORIDE 150 MG: 150 TABLET, EXTENDED RELEASE ORAL at 08:48

## 2025-05-25 RX ADMIN — EZETIMIBE 10 MG: 10 TABLET ORAL at 08:49

## 2025-05-25 RX ADMIN — LUBIPROSTONE 24 MCG: 24 CAPSULE, GELATIN COATED ORAL at 08:51

## 2025-05-25 RX ADMIN — ARIPIPRAZOLE 2 MG: 2 TABLET ORAL at 08:49

## 2025-05-25 RX ADMIN — PANTOPRAZOLE SODIUM 40 MG: 40 TABLET, DELAYED RELEASE ORAL at 08:48

## 2025-05-25 RX ADMIN — APIXABAN 2.5 MG: 2.5 TABLET, FILM COATED ORAL at 21:05

## 2025-05-25 RX ADMIN — PANTOPRAZOLE SODIUM 40 MG: 40 TABLET, DELAYED RELEASE ORAL at 16:53

## 2025-05-25 RX ADMIN — HYDROXYCHLOROQUINE SULFATE 400 MG: 200 TABLET, FILM COATED ORAL at 08:48

## 2025-05-25 RX ADMIN — LUBIPROSTONE 24 MCG: 24 CAPSULE, GELATIN COATED ORAL at 21:05

## 2025-05-25 RX ADMIN — BUSPIRONE HYDROCHLORIDE 15 MG: 15 TABLET ORAL at 17:08

## 2025-05-25 RX ADMIN — BUDESONIDE AND FORMOTEROL FUMARATE DIHYDRATE 2 PUFF: 160; 4.5 AEROSOL RESPIRATORY (INHALATION) at 08:52

## 2025-05-25 RX ADMIN — FLUTICASONE PROPIONATE 1 SPRAY: 50 SPRAY, METERED NASAL at 08:52

## 2025-05-25 RX ADMIN — ERGOCALCIFEROL 50000 UNITS: 1.25 CAPSULE, LIQUID FILLED ORAL at 12:03

## 2025-05-25 RX ADMIN — BUSPIRONE HYDROCHLORIDE 15 MG: 15 TABLET ORAL at 08:51

## 2025-05-25 RX ADMIN — DULOXETINE HYDROCHLORIDE 60 MG: 60 CAPSULE, DELAYED RELEASE ORAL at 08:48

## 2025-05-25 RX ADMIN — METFORMIN HYDROCHLORIDE 500 MG: 500 TABLET, EXTENDED RELEASE ORAL at 08:51

## 2025-05-25 RX ADMIN — Medication 400 UNITS: at 08:48

## 2025-05-25 RX ADMIN — Medication 3 MG: at 21:05

## 2025-05-25 NOTE — PROGRESS NOTES
"Progress Note - Behavioral Health   Myrna Silvestre 49 y.o. female MRN: [id number]  Unit/Bed#: [facility] 208-01 Encounter: [id number]    Assessment & Plan   Principal Problem:    MDD (major depressive disorder), single episode, severe with psychotic features (HCC)  Active Problems:    Complicated grief      Assessment & Plan  MDD (major depressive disorder), single episode, severe with psychotic features (HCC)  Resume home Wellbutrin  mg daily  Resume home Buspar 15 mg BID   Resume home Cymbalta 60 mg daily  Continue Abilify 2 mg daily for psychotic symptoms       Complicated grief  See above        Discharge planning: TBD, patient hoping to be discharged Tuesday as she has cardiology appointment for her loop recorder on Wednesday and a psychiatric appointment on Thursday     /68 (BP Location: Left arm)   Pulse 76   Temp 98 °F (36.7 °C) (Temporal)   Resp 18   Ht 5' 7\" (1.702 m)   Wt 77.7 kg (171 lb 3.2 oz)   SpO2 98%   BMI 26.81 kg/m²        Subjective: Today on my assessment the patient is currently denying auditory and visual hallucinations of her  family members.  She states the last time that she heard this was on Wednesday prior to admission.  When asked for details regarding these hallucinations she mentions that she would hear her aunt and was able to see her on as well.  She denies being significantly distressed by this, however she realized it was not normal and that is when she reached out to her therapist who recommended that she come to the hospital to be evaluated.  She is currently denying any serious adverse effects of her medications.  She also denies suicidal and homicidal ideation.  She denies any issues with sleep and reports getting 8 hours of adequate sleep.  She was also able to eat all for breakfast.  She tells this writer that she is looking forward to being discharged earlier this week as she has a cardiology appointment that she does not want to miss on Wednesday for " "her Loop recorder as well as a psychiatric appointment on Thursday.  She is brighter on exam and appears to be improving with regards to depressed mood and hallucinations.    Mental Status Evaluation:  Appearance:  casually dressed   Behavior:  Calm, cooperative, more talkative   Speech:  normal pitch and normal volume   Mood:  \"Improved\"   Affect:  constricted and brighter at times   Thought Process:  intact   Associations: intact associations   Thought Content:  No overt delusions   Perceptual Disturbances: None   Risk Potential: Suicidal Ideations none  Homicidal Ideations none  Potential for Aggression No   Sensorium:  person, place, time/date, and situation   Memory:  recent and remote memory grossly intact   Consciousness:  alert and awake    Attention: attention span and concentration were age appropriate   Insight:  limited   Judgment: limited   Gait/Station: normal gait/station   Motor Activity: no abnormal movements     Progress Toward Goals: Progressing slowly    Recommended Treatment: Continue with group therapy, milieu therapy and occupational therapy.      Risks, benefits, and possible side effects of medications explained to patient and patient verbalizes understanding.      Medications: all current active meds have been reviewed, continue current psychiatric medications, and current meds:   Current Facility-Administered Medications:     acetaminophen (TYLENOL) tablet 650 mg, Q4H PRN    acetaminophen (TYLENOL) tablet 650 mg, Q4H PRN    acetaminophen (TYLENOL) tablet 975 mg, Q6H PRN    albuterol (PROVENTIL HFA,VENTOLIN HFA) inhaler 2 puff, Q6H PRN    apixaban (ELIQUIS) tablet 2.5 mg, BID    ARIPiprazole (ABILIFY) tablet 2 mg, Daily    benztropine (COGENTIN) injection 1 mg, Q4H PRN Max 6/day    benztropine (COGENTIN) tablet 0.5 mg, Q4H PRN Max 6/day    budesonide-formoterol (SYMBICORT) 160-4.5 mcg/act inhaler 2 puff, BID    buPROPion (WELLBUTRIN XL) 24 hr tablet 150 mg, Daily    busPIRone (BUSPAR) " tablet 15 mg, BID    ergocalciferol (VITAMIN D2) capsule 50,000 Units, Weekly **FOLLOWED BY** [START ON 7/20/2025] Cholecalciferol (VITAMIN D3) tablet 1,000 Units, Daily    cyanocobalamin (VITAMIN B-12) tablet 500 mcg, Daily    dicyclomine (BENTYL) tablet 20 mg, Q6H PRN    DULoxetine (CYMBALTA) delayed release capsule 60 mg, Daily    ezetimibe (ZETIA) tablet 10 mg, Daily    ferrous sulfate tablet 325 mg, Daily With Breakfast    fluticasone (FLONASE) 50 mcg/act nasal spray 1 spray, Daily    folic acid (FOLVITE) tablet 1,000 mcg, Daily    hydroxychloroquine (PLAQUENIL) tablet 400 mg, Daily With Breakfast    hydrOXYzine HCL (ATARAX) tablet 25 mg, Q6H PRN Max 4/day    levothyroxine tablet 137 mcg, Once per day on Sunday Saturday    [START ON 5/26/2025] levothyroxine tablet 150 mcg, Once per day on Monday Tuesday Wednesday Thursday Friday    LORazepam (ATIVAN) injection 1 mg, Q6H PRN Max 3/day    LORazepam (ATIVAN) tablet 0.5 mg, Q6H PRN Max 4/day    LORazepam (ATIVAN) tablet 1 mg, Q6H PRN Max 3/day    lubiprostone (AMITIZA) capsule 24 mcg, BID    meclizine (ANTIVERT) tablet 12.5 mg, Daily PRN    melatonin tablet 3 mg, HS    metFORMIN (GLUCOPHAGE-XR) 24 hr tablet 500 mg, Daily With Breakfast    metoprolol succinate (TOPROL-XL) 24 hr tablet 100 mg, Daily    OLANZapine (ZyPREXA) IM injection 5 mg, Q3H PRN Max 3/day    OLANZapine (ZyPREXA) tablet 2.5 mg, Q4H PRN Max 6/day    OLANZapine (ZyPREXA) tablet 5 mg, Q4H PRN Max 3/day    OLANZapine (ZyPREXA) tablet 5 mg, Q3H PRN Max 3/day    ondansetron (ZOFRAN-ODT) dispersible tablet 4 mg, Q6H PRN    oxybutynin (DITROPAN-XL) 24 hr tablet 10 mg, Daily    pantoprazole (PROTONIX) EC tablet 40 mg, BID With Meals    spironolactone (ALDACTONE) tablet 50 mg, Daily    umeclidinium 62.5 mcg/actuation inhaler AEPB 1 puff, Daily    vitamin A capsule 10,000 Units, Daily    vitamin E (TOCOPHEROL) capsule 400 Units, Daily.    Labs: I have personally reviewed all pertinent laboratory/tests  results. Most Recent Labs:   Lab Results   Component Value Date    WBC 7.99 05/22/2025    RBC 3.94 05/22/2025    HGB 10.7 (L) 05/22/2025    HCT 34.4 (L) 05/22/2025     (H) 05/22/2025    RDW 14.5 05/22/2025    NEUTROABS 5.85 05/22/2025    SODIUM 137 05/22/2025    K 3.5 05/22/2025     05/22/2025    CO2 27 05/22/2025    BUN 12 05/22/2025    CREATININE 0.58 (L) 05/22/2025    GLUC 90 05/22/2025    CALCIUM 9.5 05/22/2025    AST 10 (L) 05/22/2025    ALT 9 05/22/2025    ALKPHOS 51 05/22/2025    TP 8.1 05/22/2025    ALB 4.4 05/22/2025    TBILI 0.23 05/22/2025    CHOLESTEROL 170 05/24/2025    HDL 53 05/24/2025    TRIG 55 05/24/2025    LDLCALC 106 (H) 05/24/2025    NONHDLC 117 05/24/2025    [id number] 4.495 05/22/2025    FREET4 0.68 11/07/2024    HGBA1C 5.5 05/24/2025     05/24/2025       Counseling / Coordination of Care  Total floor / unit time spent today 30 minutes. Greater than 50% of total time was spent with the patient and / or family counseling and / or coordination of care. A description of the counseling / coordination of care: Medication education, medication management, treatment plan, supportive therapy    This note has been constructed using a voice recognition system. There may be translation, syntax, or grammatical errors. If you have any questions, please contact the dictating author.     Carlos Harrison MD   Department of Psychiatry and Behavioral Health

## 2025-05-25 NOTE — NURSING NOTE
Pt was visible on the milieu, withdrawn to self. Pt endorsed mild anxiety and depression, denied all other psych symptoms. Pt was pleasant, cooperative, and med compliant. Will CTM. Q15 minute pt safety checks ongoing.

## 2025-05-25 NOTE — NURSING NOTE
BLEPS assessment completed & see Head to Toe assessment. Lungs clear upon auscultation & no peripheral edema noted. Pt brightens upon approach & is quiet. Pt socializes minimally with other patients. Pt up ad merry & gait is steady. Pt is cooperative & compliant with medications. Pt denies any depression or anxiety. Pt denies any hallucinations, suicidal or homicidal ideations. Q 15 min checks maintained to monitor pt's behavior & safety.   Unknown

## 2025-05-25 NOTE — PROGRESS NOTES
"Progress Note - Myrna Silvestre 49 y.o. female MRN: [id number]    Unit/Bed#: [facility] 208-01 Encounter: [id number]        Subjective:   Patient seen and examined at bedside after reviewing the chart and discussing the case with the caring staff.      Patient examined at bedside.  Patient has no acute symptoms.    Physical Exam   Vitals: Blood pressure 114/68, pulse 76, temperature 98 °F (36.7 °C), temperature source Temporal, resp. rate 18, height 5' 7\" (1.702 m), weight 77.7 kg (171 lb 3.2 oz), SpO2 98%.,Body mass index is 26.81 kg/m².  Constitutional: Patient in no acute distress.  HEENT: PERR, EOMI, MMM.  Cardiovascular: Normal rate and regular rhythm.    Pulmonary/Chest: Effort normal and breath sounds normal.   Abdomen: Soft, + BS, NT.    Assessment/Plan:  Myrna Silvestre is a(n) 49 y.o. female with MDD.     Cardiac w/ Hx of DVT/PE, HLD, POTS, syncope, palpitations.  Continue Eliquis 2.5 mg twice daily, Toprol  mg daily, and Zetia 10 mg daily.  Patient has loop recorder in place.  Follows with cardiology outpt.  Meclizine as needed for dizziness.   Asthma.  Continue Symbicort 160/4.5 mcg twice daily and umeclidinium 62.5 mcg/act (Spiriva nonform).  Albuterol as needed.   Allergic rhinitis.  Continue Flonase spray daily.   Rheumatoid arthritis.  Continue Plaquenil 400 mg daily.  Methotrexate 25 mg/10 mL inj and Orencia 125 mg/mL inj weekly (takes on Wednesdays, last dose 5/21, can have someone bring in from home).  Prediabetes.  Hgb A1c 5.5 on 5/24.  Home Wegovy 2.4 mg weekly (pt may bring in from home).  Continue Metformin  mg daily (750 nonform).   Hypothyroidism.  TSH WNL.  Continue levothyroxine 150 mcg M-F and 137 mcg Sat/Sun.    Hirsutism.  Continue Spironolactone 50 mg daily  GERD.  Continue Protonix 40 mg daily.   Constipation.  Continue Amitiza 24 mcg twice daily (Linzess nonform).  Bentyl as needed.  OAB, Stress incontinence.  Continue Oxybutynin XL 10 mg daily (tolterodine nonform).  Iron " deficiency anemia.  Hgb 10.7 on 5/22.  Continue ferrous sulfate 324 mg daily and folic acid 1 mg daily.  Cont to monitor.   Vitamin D deficiency.  Level 24.7 on 5/24.  Start Vit D2 50,000 units weekly x 8 weeks followed by Vit D3 1,000 units daily.   Vitamin B12 deficiency.  Start B12 500 mcg daily 5/25.  Chronic low back pain, scoliosis of thoracolumbar spine.  Patient follows with ortho outpt.  Tylenol as needed.     The patient was discussed with Dr. Zee and he is in agreement with the above note.

## 2025-05-25 NOTE — ASSESSMENT & PLAN NOTE
Resume home Wellbutrin  mg daily  Resume home Buspar 15 mg BID   Resume home Cymbalta 60 mg daily  Continue Abilify 2 mg daily for psychotic symptoms

## 2025-05-26 PROCEDURE — 99232 SBSQ HOSP IP/OBS MODERATE 35: CPT

## 2025-05-26 RX ORDER — METOPROLOL SUCCINATE 50 MG/1
100 TABLET, EXTENDED RELEASE ORAL DAILY
Status: DISCONTINUED | OUTPATIENT
Start: 2025-05-27 | End: 2025-05-28 | Stop reason: HOSPADM

## 2025-05-26 RX ORDER — SPIRONOLACTONE 50 MG/1
50 TABLET, FILM COATED ORAL DAILY
Status: DISCONTINUED | OUTPATIENT
Start: 2025-05-27 | End: 2025-05-28 | Stop reason: HOSPADM

## 2025-05-26 RX ADMIN — Medication 10000 UNITS: at 08:38

## 2025-05-26 RX ADMIN — Medication 400 UNITS: at 08:34

## 2025-05-26 RX ADMIN — FERROUS SULFATE TAB 325 MG (65 MG ELEMENTAL FE) 325 MG: 325 (65 FE) TAB at 08:34

## 2025-05-26 RX ADMIN — CYANOCOBALAMIN TAB 500 MCG 500 MCG: 500 TAB at 08:34

## 2025-05-26 RX ADMIN — PANTOPRAZOLE SODIUM 40 MG: 40 TABLET, DELAYED RELEASE ORAL at 17:44

## 2025-05-26 RX ADMIN — METFORMIN HYDROCHLORIDE 500 MG: 500 TABLET, EXTENDED RELEASE ORAL at 08:34

## 2025-05-26 RX ADMIN — BUDESONIDE AND FORMOTEROL FUMARATE DIHYDRATE 2 PUFF: 160; 4.5 AEROSOL RESPIRATORY (INHALATION) at 17:45

## 2025-05-26 RX ADMIN — BUSPIRONE HYDROCHLORIDE 15 MG: 15 TABLET ORAL at 17:44

## 2025-05-26 RX ADMIN — LEVOTHYROXINE SODIUM 150 MCG: 75 TABLET ORAL at 06:02

## 2025-05-26 RX ADMIN — BUDESONIDE AND FORMOTEROL FUMARATE DIHYDRATE 2 PUFF: 160; 4.5 AEROSOL RESPIRATORY (INHALATION) at 08:35

## 2025-05-26 RX ADMIN — FLUTICASONE PROPIONATE 1 SPRAY: 50 SPRAY, METERED NASAL at 08:36

## 2025-05-26 RX ADMIN — LUBIPROSTONE 24 MCG: 24 CAPSULE, GELATIN COATED ORAL at 08:33

## 2025-05-26 RX ADMIN — EZETIMIBE 10 MG: 10 TABLET ORAL at 08:33

## 2025-05-26 RX ADMIN — APIXABAN 2.5 MG: 2.5 TABLET, FILM COATED ORAL at 08:34

## 2025-05-26 RX ADMIN — SPIRONOLACTONE 50 MG: 50 TABLET ORAL at 08:34

## 2025-05-26 RX ADMIN — HYDROXYCHLOROQUINE SULFATE 400 MG: 200 TABLET, FILM COATED ORAL at 08:34

## 2025-05-26 RX ADMIN — DULOXETINE HYDROCHLORIDE 60 MG: 60 CAPSULE, DELAYED RELEASE ORAL at 08:34

## 2025-05-26 RX ADMIN — ARIPIPRAZOLE 2 MG: 2 TABLET ORAL at 08:34

## 2025-05-26 RX ADMIN — FOLIC ACID 1000 MCG: 1 TABLET ORAL at 08:33

## 2025-05-26 RX ADMIN — APIXABAN 2.5 MG: 2.5 TABLET, FILM COATED ORAL at 21:49

## 2025-05-26 RX ADMIN — LUBIPROSTONE 24 MCG: 24 CAPSULE, GELATIN COATED ORAL at 21:48

## 2025-05-26 RX ADMIN — BUSPIRONE HYDROCHLORIDE 15 MG: 15 TABLET ORAL at 08:34

## 2025-05-26 RX ADMIN — UMECLIDINIUM 1 PUFF: 62.5 AEROSOL, POWDER ORAL at 08:35

## 2025-05-26 RX ADMIN — OXYBUTYNIN 10 MG: 5 TABLET, FILM COATED, EXTENDED RELEASE ORAL at 08:34

## 2025-05-26 RX ADMIN — BUPROPION HYDROCHLORIDE 150 MG: 150 TABLET, EXTENDED RELEASE ORAL at 08:34

## 2025-05-26 RX ADMIN — PANTOPRAZOLE SODIUM 40 MG: 40 TABLET, DELAYED RELEASE ORAL at 08:34

## 2025-05-26 RX ADMIN — Medication 3 MG: at 21:49

## 2025-05-26 NOTE — NURSING NOTE
Pt was visible on the milieu, mostly withdrawn to self. Pt denies all psych symptoms. Pt was pleasant, cooperative, and med compliant. Will CTM. Q15 minute pt safety checks ongoing.

## 2025-05-26 NOTE — PLAN OF CARE
Problem: DISCHARGE PLANNING - CARE MANAGEMENT  Goal: Discharge to post-acute care or home with appropriate resources  Description: INTERVENTIONS:  - Conduct assessment to determine patient/family and health care team treatment goals, and need for post-acute services based on payer coverage, community resources, and patient preferences, and barriers to discharge  - Address psychosocial, clinical, and financial barriers to discharge as identified in assessment in conjunction with the patient/family and health care team  - Arrange appropriate level of post-acute services according to patient’s   needs and preference and payer coverage in collaboration with the physician and health care team  - Communicate with and update the patient/family, physician, and health care team regarding progress on the discharge plan  - Arrange appropriate transportation to post-acute venues  Outcome: Progressing     New goal initiated 201.

## 2025-05-26 NOTE — PROGRESS NOTES
"Progress Note - Myrna Silvestre 49 y.o. female MRN: [id number]    Unit/Bed#: [facility] 208-01 Encounter: [id number]        Subjective:   Patient seen and examined at bedside after reviewing the chart and discussing the case with the caring staff.      Patient examined at bedside.  Patient has no acute symptoms.    Physical Exam   Vitals: Blood pressure 105/56, pulse 73, temperature 98.4 °F (36.9 °C), temperature source Temporal, resp. rate 16, height 5' 7\" (1.702 m), weight 77.7 kg (171 lb 3.2 oz), SpO2 98%.,Body mass index is 26.81 kg/m².  Constitutional: Patient in no acute distress.  HEENT: PERR, EOMI, MMM.  Cardiovascular: Normal rate and regular rhythm.    Pulmonary/Chest: Effort normal and breath sounds normal.   Abdomen: Soft, + BS, NT.    Assessment/Plan:  Myrna Silvestre is a(n) 49 y.o. female with MDD.     Cardiac w/ Hx of DVT/PE, HLD, POTS, syncope, palpitations.  Continue Eliquis 2.5 mg twice daily, Toprol  mg daily, and Zetia 10 mg daily.  Patient has loop recorder in place.  Follows with cardiology outpt.  Meclizine as needed for dizziness.   Asthma.  Continue Symbicort 160/4.5 mcg twice daily and umeclidinium 62.5 mcg/act (Spiriva nonform).  Albuterol as needed.   Allergic rhinitis.  Continue Flonase spray daily.   Rheumatoid arthritis.  Continue Plaquenil 400 mg daily.  Methotrexate 25 mg/10 mL inj and Orencia 125 mg/mL inj weekly (takes on Wednesdays, last dose 5/21, can have someone bring in from home).  Prediabetes.  Hgb A1c 5.5% on 5/24.  Home: Wegovy 2.4 mg weekly (pt may bring in from home).  Continue Metformin  mg daily (750 nonform).   Hypothyroidism.  TSH WNL.  Continue levothyroxine 150 mcg M-F and 137 mcg Sat/Sun.    Hirsutism.  Continue Spironolactone 50 mg daily  GERD.  Continue Protonix 40 mg daily.   Constipation.  Continue Amitiza 24 mcg twice daily (Linzess nonform).  Bentyl as needed.  OAB/stress incontinence.  Continue oxybutynin XL 10 mg daily (tolterodine nonform).  Iron " deficiency anemia.  Hgb 10.7 on 5/22.  Continue ferrous sulfate 324 mg daily and folic acid 1 mg daily.  Cont to monitor.   Vitamin D deficiency.  Level 24.7 on 5/24.  Start Vit D2 50,000 units weekly x 8 weeks followed by Vit D3 1,000 units daily.   Vitamin B12 deficiency.  Start B12 500 mcg daily 5/25.  Chronic low back pain/scoliosis of thoracolumbar spine.  Patient follows with ortho outpt.  Tylenol as needed.     The patient was discussed with Dr. Zee and he is in agreement with the above note.

## 2025-05-26 NOTE — ASSESSMENT & PLAN NOTE
Continue Wellbutrin  mg daily  Continue  Buspar 15 mg BID   Continue Cymbalta 60 mg daily  Continue Abilify 2 mg daily for psychotic symptoms

## 2025-05-26 NOTE — NURSING NOTE
Patient is pleasant, calm, and cooperative this morning. She denies feeling anxious or depressed. She states she is hoping to discharge this week as she has an appointment with her cardiologist and discussed with this nurse possibly needing a pacemaker in the future. She has good insight and is knowledgeable about her medical issues. AM BP medications held for SBP of 105. Maggi Pritchard PA-C aware and has changed hold parameters for future doses as patient states it is important she takes these medications. She has no complaints of pain and is able to make needs known.

## 2025-05-26 NOTE — PROGRESS NOTES
05/26/25    Team Meeting   Meeting Type Tx Team Meeting   Initial Conference Date 05/26/25   Team Members Present   Team Members Present Physician;Nurse;   Physician Team Member MD Yayo   Nursing Team Member FLORENCE Gordon   Care Management Team Member MS Bakari   Patient/Family Present   Patient Present Yes   Patient's Family Present No   CM met with pt and other members of the treatment team to review over pt's treatment plan. Pt verbalized an understanding of her treatment/treatment plan and signed it with out any concerns. Pt was pleasant and engaging during the contact.

## 2025-05-26 NOTE — PROGRESS NOTES
Progress Note - Behavioral Health   Name: Myrna Silvestre 49 y.o. female I MRN: [id number]  Unit/Bed#: [facility] 208-01 I Date of Admission: 5/23/2025   Date of Service: 5/26/2025 I Hospital Day: 3    Assessment & Plan  MDD (major depressive disorder), single episode, severe with psychotic features (HCC)  Continue Wellbutrin  mg daily  Continue  Buspar 15 mg BID   Continue Cymbalta 60 mg daily  Continue Abilify 2 mg daily for psychotic symptoms       Complicated grief  See above     Current Medications:    Current Facility-Administered Medications:     apixaban (ELIQUIS) tablet 2.5 mg, Oral, BID    ARIPiprazole (ABILIFY) tablet 2 mg, Oral, Daily    budesonide-formoterol (SYMBICORT) 160-4.5 mcg/act inhaler 2 puff, Inhalation, BID    buPROPion (WELLBUTRIN XL) 24 hr tablet 150 mg, Oral, Daily    busPIRone (BUSPAR) tablet 15 mg, Oral, BID    ergocalciferol (VITAMIN D2) capsule 50,000 Units, Oral, Weekly **FOLLOWED BY** [START ON 7/20/2025] Cholecalciferol (VITAMIN D3) tablet 1,000 Units, Oral, Daily    cyanocobalamin (VITAMIN B-12) tablet 500 mcg, Oral, Daily    DULoxetine (CYMBALTA) delayed release capsule 60 mg, Oral, Daily    ezetimibe (ZETIA) tablet 10 mg, Oral, Daily    ferrous sulfate tablet 325 mg, Oral, Daily With Breakfast    fluticasone (FLONASE) 50 mcg/act nasal spray 1 spray, Each Nare, Daily    folic acid (FOLVITE) tablet 1,000 mcg, Oral, Daily    hydroxychloroquine (PLAQUENIL) tablet 400 mg, Oral, Daily With Breakfast    levothyroxine tablet 137 mcg, Oral, Once per day on Sunday Saturday    levothyroxine tablet 150 mcg, Oral, Once per day on Monday Tuesday Wednesday Thursday Friday    lubiprostone (AMITIZA) capsule 24 mcg, Oral, BID    melatonin tablet 3 mg, Oral, HS    metFORMIN (GLUCOPHAGE-XR) 24 hr tablet 500 mg, Oral, Daily With Breakfast    metoprolol succinate (TOPROL-XL) 24 hr tablet 100 mg, Oral, Daily    oxybutynin (DITROPAN-XL) 24 hr tablet 10 mg, Oral, Daily    pantoprazole (PROTONIX) EC tablet  40 mg, Oral, BID With Meals    spironolactone (ALDACTONE) tablet 50 mg, Oral, Daily    umeclidinium 62.5 mcg/actuation inhaler AEPB 1 puff, Inhalation, Daily    vitamin A capsule 10,000 Units, Oral, Daily    vitamin E (TOCOPHEROL) capsule 400 Units, Oral, Daily    Current Facility-Administered Medications:     acetaminophen (TYLENOL) tablet 650 mg, Oral, Q4H PRN    acetaminophen (TYLENOL) tablet 650 mg, Oral, Q4H PRN    acetaminophen (TYLENOL) tablet 975 mg, Oral, Q6H PRN    albuterol (PROVENTIL HFA,VENTOLIN HFA) inhaler 2 puff, Inhalation, Q6H PRN    benztropine (COGENTIN) injection 1 mg, Intramuscular, Q4H PRN Max 6/day    benztropine (COGENTIN) tablet 0.5 mg, Oral, Q4H PRN Max 6/day    dicyclomine (BENTYL) tablet 20 mg, Oral, Q6H PRN    hydrOXYzine HCL (ATARAX) tablet 25 mg, Oral, Q6H PRN Max 4/day    LORazepam (ATIVAN) injection 1 mg, Intramuscular, Q6H PRN Max 3/day    LORazepam (ATIVAN) tablet 0.5 mg, Oral, Q6H PRN Max 4/day    LORazepam (ATIVAN) tablet 1 mg, Oral, Q6H PRN Max 3/day    meclizine (ANTIVERT) tablet 12.5 mg, Oral, Daily PRN    OLANZapine (ZyPREXA) IM injection 5 mg, Intramuscular, Q3H PRN Max 3/day    OLANZapine (ZyPREXA) tablet 2.5 mg, Oral, Q4H PRN Max 6/day    OLANZapine (ZyPREXA) tablet 5 mg, Oral, Q4H PRN Max 3/day    OLANZapine (ZyPREXA) tablet 5 mg, Oral, Q3H PRN Max 3/day    ondansetron (ZOFRAN-ODT) dispersible tablet 4 mg, Oral, Q6H PRN    Risks/Benefits of Treatment:     Risks, benefits, and possible side effects of medications explained to patient and patient verbalizes understanding and agreement for treatment.    Progress Toward Goals: unchanged    Treatment Planning:      - Encourage early mobility and having a structured day  - Provide frequent re-orientation, and cognitive stimulation  - Ensure assistive devices are in proper working order (eye-glasses, hearing aids)  - Encourage adequate hydration, nutrition and monitor bowel movements  - Maintain sleep-wake cycle: Uninterrupted  sleep time; low-level lighting at night  - Fall precaution  - Follow up with [facility] regarding the medical problems   - Continue medication titration and treatment plan; adjust medication to optimize treatment response and as clinically indicated.   - Observation: N/A  - VS: as per unit protocol  - Encourage group attendance and milieu therapy  - Dispo: To be determined  - Estimated Discharge Day:  7 days (6/2/2025)  - Legal Status : Voluntary 201 commitment.  - Long Stay Certification : Not Applicable    Subjective     Myrna was seen today for psychiatric follow-up.  Patient calm, cooperative.  She is visible, however patient is withdrawn in the milieu on observation.  Patient is preoccupied with discharge during interview.  Patient reports she has a cardiac appointment for her loop recorder and needs to be discharged tomorrow.  She appears depressed and ruminative in thought during interview.  She denied any SI/HI/AVH.  She did not appear internally preoccupied.    Sleep: normal  Appetite: normal  Medication side effects: No  ROS: review of systems as noted above in HPI/Subjective report, all other systems are negative    Objective :  Temp:  [97.5 °F (36.4 °C)-98.4 °F (36.9 °C)] 98.4 °F (36.9 °C)  HR:  [73-80] 73  BP: (105-107)/(53-57) 105/56  Resp:  [16-18] 16  SpO2:  [98 %-100 %] 98 %  O2 Device: None (Room air)    Mental Status Evaluation:    Appearance:  Dressed in paper scrubs, age appropriate   Behavior:  cooperative   Speech:  normal rate and volume   Mood:  depressed   Affect:  constricted   Thought Process:  logical   Thought Content:  ruminations   Perceptual Disturbances: Denied AVH, did not appear internally preoccupied   Risk Potential: Suicidal Ideation -  None at present  Homicidal Ideation -  None at present  Potential for Aggression - No   Sensorium:  oriented to person, place, and time/date   Memory:  recent and remote memory grossly intact   Consciousness:  alert and awake  "  Attention/Concentration: attention span and concentration are age appropriate   Insight:  limited   Judgment: limited   Gait/Station: normal gait/station   Motor Activity: no abnormal movements     Cognitive Assessment : N/A  Pain : denied  Pain Scale : 0      Lab Results: I have reviewed the following results:  Most Recent Labs:   Lab Results   Component Value Date    WBC 7.99 05/22/2025    RBC 3.94 05/22/2025    HGB 10.7 (L) 05/22/2025    HCT 34.4 (L) 05/22/2025     (H) 05/22/2025    RDW 14.5 05/22/2025    NEUTROABS 5.85 05/22/2025    SODIUM 137 05/22/2025    K 3.5 05/22/2025     05/22/2025    CO2 27 05/22/2025    BUN 12 05/22/2025    CREATININE 0.58 (L) 05/22/2025    GLUC 90 05/22/2025    CALCIUM 9.5 05/22/2025    AST 10 (L) 05/22/2025    ALT 9 05/22/2025    ALKPHOS 51 05/22/2025    TP 8.1 05/22/2025    ALB 4.4 05/22/2025    TBILI 0.23 05/22/2025    CHOLESTEROL 170 05/24/2025    HDL 53 05/24/2025    TRIG 55 05/24/2025    LDLCALC 106 (H) 05/24/2025    NONHDLC 117 05/24/2025    NZQ8MOFHCTGC 4.495 05/22/2025    FREET4 0.68 11/07/2024    PREGUR negative 09/14/2021    HGBA1C 5.5 05/24/2025     05/24/2025       Administrative Statements     Counseling / Coordination of Care:   Patient's progress discussed with staff in treatment team meeting.  Medication changes reviewed with staff in treatment team meeting.    Portions of the record may have been created with voice recognition software. Occasional wrong word or \"sound a like\" substitutions may have occurred due to the inherent limitations of voice recognition software. Read the chart carefully and recognize, using context, where substitutions have occurred.    BLAKE Grant 05/26/25  "

## 2025-05-27 PROCEDURE — 99232 SBSQ HOSP IP/OBS MODERATE 35: CPT | Performed by: PSYCHIATRY & NEUROLOGY

## 2025-05-27 RX ORDER — ARIPIPRAZOLE 2 MG/1
2 TABLET ORAL DAILY
Qty: 30 TABLET | Refills: 0 | Status: SHIPPED | OUTPATIENT
Start: 2025-05-28

## 2025-05-27 RX ORDER — MECLIZINE HCL 12.5 MG 12.5 MG/1
12.5 TABLET ORAL DAILY PRN
Status: DISCONTINUED | OUTPATIENT
Start: 2025-05-27 | End: 2025-05-28 | Stop reason: HOSPADM

## 2025-05-27 RX ADMIN — HYDROXYCHLOROQUINE SULFATE 400 MG: 200 TABLET, FILM COATED ORAL at 08:08

## 2025-05-27 RX ADMIN — APIXABAN 2.5 MG: 2.5 TABLET, FILM COATED ORAL at 21:06

## 2025-05-27 RX ADMIN — OXYBUTYNIN 10 MG: 5 TABLET, FILM COATED, EXTENDED RELEASE ORAL at 08:08

## 2025-05-27 RX ADMIN — Medication 3 MG: at 21:06

## 2025-05-27 RX ADMIN — APIXABAN 2.5 MG: 2.5 TABLET, FILM COATED ORAL at 08:07

## 2025-05-27 RX ADMIN — BUSPIRONE HYDROCHLORIDE 15 MG: 15 TABLET ORAL at 08:07

## 2025-05-27 RX ADMIN — DULOXETINE HYDROCHLORIDE 60 MG: 60 CAPSULE, DELAYED RELEASE ORAL at 08:07

## 2025-05-27 RX ADMIN — LEVOTHYROXINE SODIUM 150 MCG: 75 TABLET ORAL at 05:44

## 2025-05-27 RX ADMIN — FOLIC ACID 1000 MCG: 1 TABLET ORAL at 08:07

## 2025-05-27 RX ADMIN — BUDESONIDE AND FORMOTEROL FUMARATE DIHYDRATE 2 PUFF: 160; 4.5 AEROSOL RESPIRATORY (INHALATION) at 17:08

## 2025-05-27 RX ADMIN — PANTOPRAZOLE SODIUM 40 MG: 40 TABLET, DELAYED RELEASE ORAL at 15:57

## 2025-05-27 RX ADMIN — PANTOPRAZOLE SODIUM 40 MG: 40 TABLET, DELAYED RELEASE ORAL at 08:08

## 2025-05-27 RX ADMIN — BUDESONIDE AND FORMOTEROL FUMARATE DIHYDRATE 2 PUFF: 160; 4.5 AEROSOL RESPIRATORY (INHALATION) at 08:06

## 2025-05-27 RX ADMIN — LUBIPROSTONE 24 MCG: 24 CAPSULE, GELATIN COATED ORAL at 21:05

## 2025-05-27 RX ADMIN — SPIRONOLACTONE 50 MG: 50 TABLET ORAL at 08:08

## 2025-05-27 RX ADMIN — UMECLIDINIUM 1 PUFF: 62.5 AEROSOL, POWDER ORAL at 08:06

## 2025-05-27 RX ADMIN — FERROUS SULFATE TAB 325 MG (65 MG ELEMENTAL FE) 325 MG: 325 (65 FE) TAB at 08:07

## 2025-05-27 RX ADMIN — EZETIMIBE 10 MG: 10 TABLET ORAL at 08:08

## 2025-05-27 RX ADMIN — Medication 10000 UNITS: at 08:07

## 2025-05-27 RX ADMIN — BUPROPION HYDROCHLORIDE 150 MG: 150 TABLET, EXTENDED RELEASE ORAL at 08:07

## 2025-05-27 RX ADMIN — FLUTICASONE PROPIONATE 1 SPRAY: 50 SPRAY, METERED NASAL at 08:06

## 2025-05-27 RX ADMIN — METFORMIN HYDROCHLORIDE 500 MG: 500 TABLET, EXTENDED RELEASE ORAL at 08:07

## 2025-05-27 RX ADMIN — Medication 400 UNITS: at 08:07

## 2025-05-27 RX ADMIN — BUSPIRONE HYDROCHLORIDE 15 MG: 15 TABLET ORAL at 17:08

## 2025-05-27 RX ADMIN — LUBIPROSTONE 24 MCG: 24 CAPSULE, GELATIN COATED ORAL at 08:07

## 2025-05-27 RX ADMIN — CYANOCOBALAMIN TAB 500 MCG 500 MCG: 500 TAB at 08:07

## 2025-05-27 RX ADMIN — ARIPIPRAZOLE 2 MG: 2 TABLET ORAL at 08:07

## 2025-05-27 RX ADMIN — METOPROLOL SUCCINATE 100 MG: 50 TABLET, EXTENDED RELEASE ORAL at 08:07

## 2025-05-27 NOTE — PLAN OF CARE
Problem: Depression  Goal: Treatment Goal: Demonstrate behavioral control of depressive symptoms, verbalize feelings of improved mood/affect, and adopt new coping skills prior to discharge  Outcome: Completed  Goal: Verbalize thoughts and feelings  Description: Interventions:  - Assess and re-assess patient's level of risk   - Engage patient in 1:1 interactions, daily, for a minimum of 15 minutes   - Encourage patient to express feelings, fears, frustrations, hopes   Outcome: Completed  Goal: Refrain from harming self  Description: Interventions:  - Monitor patient closely, per order   - Supervise medication ingestion, monitor effects and side effects   Outcome: Completed  Goal: Refrain from isolation  Description: Interventions:  - Develop a trusting relationship   - Encourage socialization   Outcome: Completed  Goal: Refrain from self-neglect  Outcome: Completed  Goal: Attend and participate in unit activities, including therapeutic, recreational, and educational groups  Description: Interventions:  - Provide therapeutic and educational activities daily, encourage attendance and participation, and document same in the medical record   Outcome: Completed  Goal: Complete daily ADLs, including personal hygiene independently, as able  Description: Interventions:  - Observe, teach, and assist patient with ADLS  -  Monitor and promote a balance of rest/activity, with adequate nutrition and elimination   Outcome: Completed     Problem: Anxiety  Goal: Anxiety is at manageable level  Description: Interventions:  - Assess and monitor patient's anxiety level.   - Monitor for signs and symptoms (heart palpitations, chest pain, shortness of breath, headaches, nausea, feeling jumpy, restlessness, irritable, apprehensive).   - Collaborate with interdisciplinary team and initiate plan and interventions as ordered.  - [place] patient to unit/surroundings  - Explain treatment plan  - Encourage participation in care  - Encourage  verbalization of concerns/fears  - Identify coping mechanisms  - Assist in developing anxiety-reducing skills  - Administer/offer alternative therapies  - Limit or eliminate stimulants  Outcome: Completed     Problem: DISCHARGE PLANNING - CARE MANAGEMENT  Goal: Discharge to post-acute care or home with appropriate resources  Description: INTERVENTIONS:  - Conduct assessment to determine patient/family and health care team treatment goals, and need for post-acute services based on payer coverage, community resources, and patient preferences, and barriers to discharge  - Address psychosocial, clinical, and financial barriers to discharge as identified in assessment in conjunction with the patient/family and health care team  - Arrange appropriate level of post-acute services according to patient’s   needs and preference and payer coverage in collaboration with the physician and health care team  - Communicate with and update the patient/family, physician, and health care team regarding progress on the discharge plan  - Arrange appropriate transportation to post-acute venues  Outcome: Completed     Problem: Ineffective Coping  Goal: Cooperates with admission process  Description: Interventions:   - Complete admission process  Outcome: Completed  Goal: Identifies ineffective coping skills  Outcome: Completed  Goal: Identifies healthy coping skills  Outcome: Completed  Goal: Demonstrates healthy coping skills  Outcome: Completed  Goal: Participates in unit activities  Description: Interventions:  - Provide therapeutic environment   - Provide required programming   - Redirect inappropriate behaviors   Outcome: Completed  Goal: Patient/Family participate in treatment and DC plans  Description: Interventions:  - Provide therapeutic environment  Outcome: Completed  Goal: Patient/Family verbalizes awareness of resources  Outcome: Completed  Goal: Understands least restrictive measures  Description: Interventions:  - Utilize  least restrictive behavior  Outcome: Completed  Goal: Free from restraint events  Description: - Utilize least restrictive measures   - Provide behavioral interventions   - Redirect inappropriate behaviors   Outcome: Completed

## 2025-05-27 NOTE — PROGRESS NOTES
Progress Note - Behavioral Health   Name: Myrna Silvestre 49 y.o. female I MRN: [id number]   Unit/Bed#: [facility] 208-01 I Date of Admission: 5/23/2025   Date of Service: 5/27/2025 I Hospital Day: 4         Assessment & Plan  MDD (major depressive disorder), single episode, severe with psychotic features (HCC)  Continue Wellbutrin  mg daily  Continue  Buspar 15 mg BID   Continue Cymbalta 60 mg daily  Continue Abilify 2 mg daily for psychotic symptoms     Complicated grief  See above       Recommended Treatment:     Planned medication and treatment changes:    All current active medications have been reviewed  Encourage group therapy, milieu therapy and occupational therapy  Behavioral Health checks for safety monitoring  Discharge planning  CT with current meds regimen.     Current medications:  Current Facility-Administered Medications   Medication Dose Route Frequency Provider Last Rate    acetaminophen  650 mg Oral Q4H PRN Augustin Solano, CRNP      acetaminophen  650 mg Oral Q4H PRN Augustin Solano, CRNP      acetaminophen  975 mg Oral Q6H PRN Augustin Solano, CREMILIA      albuterol  2 puff Inhalation Q6H PRN Augustin Solano, CRNP      apixaban  2.5 mg Oral BID Luz Cote PA-C      ARIPiprazole  2 mg Oral Daily Carlos Harrison MD      benztropine  1 mg Intramuscular Q4H PRN Max 6/day Augustin Solano, CRNP      benztropine  0.5 mg Oral Q4H PRN Max 6/day Augustin Solano, CRNP      budesonide-formoterol  2 puff Inhalation BID Luz Cote PA-C      buPROPion  150 mg Oral Daily Carlos Harrison MD      busPIRone  15 mg Oral BID Augustin Solano, BLAKE      ergocalciferol  50,000 Units Oral Weekly Luz Cote PA-C      Followed by    [START ON 7/20/2025] cholecalciferol  1,000 Units Oral Daily Luz Cote PA-C      cyanocobalamin  500 mcg Oral Daily Luz Cote PA-C      dicyclomine  20 mg Oral Q6H PRN Augustin Solano, BLAKE      DULoxetine   60 mg Oral Daily Carlos Harrison MD      ezetimibe  10 mg Oral Daily Augustin Solano, CRNP      ferrous sulfate  325 mg Oral Daily With Breakfast Augustin Solano, CRNP      fluticasone  1 spray Each Nare Daily Luz Cote PA-C      folic acid  1,000 mcg Oral Daily Augustin Solano, CRNP      hydroxychloroquine  400 mg Oral Daily With Breakfast Luz Cote PA-C      hydrOXYzine HCL  25 mg Oral Q6H PRN Max 4/day Augustin Monica-Anom, CRNP      levothyroxine  137 mcg Oral Once per day on Sunday Saturday Augustin Anderson-Vinnym, CRNP      levothyroxine  150 mcg Oral Once per day on Monday Tuesday Wednesday Thursday Friday Augustin Monica-Anom, CRNP      LORazepam  1 mg Intramuscular Q6H PRN Max 3/day Augustin Monica-Anom, CRNP      LORazepam  0.5 mg Oral Q6H PRN Max 4/day Augustin Monica-Anom, CRNP      LORazepam  1 mg Oral Q6H PRN Max 3/day Augustin Monica-Anom, CRNP      lubiprostone  24 mcg Oral BID Augustin Monica-Vinnym, CRNP      meclizine  12.5 mg Oral Daily PRN Augustin Monica-Vinnym, CRNP      melatonin  3 mg Oral HS Augustin Monica-Vinnym, CRNP      metFORMIN  500 mg Oral Daily With Breakfast Augustin MonicaAaron, CRNP      metoprolol succinate  100 mg Oral Daily Maggi Pritchard PA-C      OLANZapine  5 mg Intramuscular Q3H PRN Max 3/day Augustin Monica-Anom, CRNP      OLANZapine  2.5 mg Oral Q4H PRN Max 6/day Augustin Monica-Anom, CRNP      OLANZapine  5 mg Oral Q4H PRN Max 3/day Augustin Monica-Anom, CRNP      OLANZapine  5 mg Oral Q3H PRN Max 3/day Augustin Monica-Anom, CRNP      ondansetron  4 mg Oral Q6H PRN Shahriar Luna MD      oxybutynin  10 mg Oral Daily Augustin Monica-Anom, CRNP      pantoprazole  40 mg Oral BID With Meals Augustin Monica-Patricia, CRNP      spironolactone  50 mg Oral Daily Maggi Pritchard PA-C      umeclidinium  1 puff Inhalation Daily BLAKE Grant      vitamin A  10,000 Units Oral Daily BLAKE Grant      vitamin E (tocopherol)  400 Units Oral Daily Augustin  BLAKE Solano         Risks / Benefits of Treatment:    Risks, benefits, and possible side effects of medications explained to patient and patient verbalizes understanding and agreement for treatment.    Subjective:    Behavior over the last 24 hours: improving.     Myrna was seen for continuing care.  She reports doing much better with reduced depressive symptoms and no longer experiencing any auditory hallucinations. She admits she was feeling severely depressed as a result of a family member death secondary to motor vehicle accident. Denies any current suicidal ideation, homicidal ideation, delusion or hallucinations and she is willing to follow up with her outpatient psychiatrist Dr. Mascorro on Thursday. Patient claims she lives with her  and 5 children. She has been compliant with medication and denies any current side effects staff report no behavior issues since her admission with some participation in group and milieu therapy.    Sleep: slept off and on  Appetite: normal  Medication side effects: No   ROS: no complaints, all other systems are negative    Mental Status Evaluation:    Appearance:  age appropriate   Behavior:  cooperative   Speech:  normal rate and volume   Mood:  improved   Affect:  appropriate   Thought Process:  goal directed   Associations: intact associations   Thought Content:  no overt delusions   Perceptual Disturbances: none   Risk Potential: Suicidal ideation - None at present  Homicidal ideation - None at present  Potential for aggression - No   Sensorium:  oriented to person, place, and time/date   Memory:  recent and remote memory grossly intact   Consciousness:  alert and awake   Attention/Concentration: attention span and concentration are age appropriate   Insight:  fair   Judgment: fair   Gait/Station: normal gait/station   Motor Activity: no abnormal movements     Vital signs in last 24 hours:    Temp:  [97.7 °F (36.5 °C)-98.2 °F (36.8 °C)] 98.2 °F (36.8 °C)  HR:   [72-99] 99  BP: ()/(51-61) 132/61  Resp:  [16-18] 18  SpO2:  [99 %-100 %] 99 %  O2 Device: None (Room air)         Laboratory results: I have personally reviewed all pertinent laboratory/tests results    Most Recent Labs:   Lab Results   Component Value Date    WBC 7.99 05/22/2025    RBC 3.94 05/22/2025    HGB 10.7 (L) 05/22/2025    HCT 34.4 (L) 05/22/2025     (H) 05/22/2025    RDW 14.5 05/22/2025    NEUTROABS 5.85 05/22/2025    SODIUM 137 05/22/2025    K 3.5 05/22/2025     05/22/2025    CO2 27 05/22/2025    BUN 12 05/22/2025    CREATININE 0.58 (L) 05/22/2025    GLUC 90 05/22/2025    CALCIUM 9.5 05/22/2025    AST 10 (L) 05/22/2025    ALT 9 05/22/2025    ALKPHOS 51 05/22/2025    TP 8.1 05/22/2025    ALB 4.4 05/22/2025    TBILI 0.23 05/22/2025    CHOLESTEROL 170 05/24/2025    HDL 53 05/24/2025    TRIG 55 05/24/2025    LDLCALC 106 (H) 05/24/2025    NONHDLC 117 05/24/2025    LTD4FRVPTPRC 4.495 05/22/2025    FREET4 0.68 11/07/2024    PREGUR negative 09/14/2021    HGBA1C 5.5 05/24/2025     05/24/2025       Counseling / Coordination of Care:    Patient's progress discussed with staff in treatment team meeting.  Medication changes reviewed with staff in treatment team meeting.  Supportive therapy provided to patient.  Group attendance encouraged.  Discharge plan discussed with patient.    Shahriar Luna MD 05/27/25

## 2025-05-27 NOTE — DISCHARGE INSTR - OTHER ORDERS
You are being discharged to your home at  [address] [facility] [address]  TELEPHONE:[phone]     Triggers you have identified during your hospitalization that led to your admission of a regressed mood due to homelessness. Coping skills you have identified during your hospitalization include listening to music. If you are unable to deal with your distressed mood alone please contact your  Jose [facility]. If that is not effective and you continue to have (ex: suicidal ideation, homicidal ideation, distressed mood, overwhelmed, in crisis) please contact Crisis by dialing [phone], [facility] Crisis Hotline: [phone] dial 171 or go to the nearest emergency center.      *[facility] Hotline: 779.240.4597  * Alcohol Anonymous: 308.355.6181  *[facility] Drug and Alcohol Commision [phone]  *National [employer] Mental Illness (ISAI) HELPLINE: 673.243.5676/Website: www.isai.org  *Substance Abuse and Mental Health Services Administration([facility]) National Helpline, which is a confidential, free, 24-hour-a-day, 365-day-a-year, information service for individuals and family members facing mental health and/or substance use disorders. This service provides referrals to local treatment facilities, support groups, and community-based organizations. Callers can also order free publications and other information.  Call 1-862.200.2508/Website: www.[facility].gov  *[employer] 2-1-1: This is a toll free, confidential, 24-hour-a-day service which connects you to a community  in your area who can help you find services and resources that are available to you locally and provide critical services that can improve and save lives.  Call: 211  /Website: http://www.[employer].org/       Lay, or Tamica, our Behavioral Health Nurse Navigators, will be calling you after your discharge, on the phone number that you provided.  They will be available as an additional support, if needed.   If you wish to speak with  one of them, you may contact Lay at [phone] or Tamica at [phone].

## 2025-05-27 NOTE — PLAN OF CARE
Problem: Depression  Goal: Refrain from isolation  Description: Interventions:  - Develop a trusting relationship   - Encourage socialization   Outcome: Progressing  Goal: Refrain from self-neglect  Outcome: Progressing

## 2025-05-27 NOTE — CASE MANAGEMENT
CM spoke with PT  Jose M and Son Alexis to notify of Discharge ,  is visually impaired and stated that he is unable to drive so Son Alexsi will drive and they will Pick PT up at 11 am . CM agreed call ended mutually

## 2025-05-27 NOTE — CASE MANAGEMENT
CM reached out to [facility] at [phone] provided notification of admission and notification of D/C for tomorrow . PT has a Psych appointment on Thursday 5/29/25.  Call ended mutually .

## 2025-05-27 NOTE — NURSING NOTE
Pt up ad merry & gait is steady. Pt is pleasant & socializes minimally with other patients. Pt is cooperative & compliant with medications. Pt denies any depression or anxiety. Pt denies any hallucinations, suicidal or homicidal ideations. Q 15 min checks maintained to monitor pt's behavior & safety.
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
## 2025-05-27 NOTE — NURSING NOTE
Pt visible on unit and withdrawn to self. She is calm, pleasant, and cooperative. She denies depression, anxiety, Si/HI/AVH. No acute concerns voiced at this time. Continuous monitoring maintained.

## 2025-05-27 NOTE — PROGRESS NOTES
05/27/25    Patient Information   Mental Status Alert   Primary Caregiver Self   Support System Immediate family   Legal Information   Tx Plan Signed Yes   Current Status: 201   Activities of Daily Living Prior to Admission   Functional Status Independent   Assistive Device No device needed   Living Arrangement House   Ambulation Independent   Access to Firearms   Access to Firearms No   Income Information   Income Source Employed   Means of Transportation   Means of Transport to Appts: Drives Self

## 2025-05-27 NOTE — PROGRESS NOTES
Data- Met with the patient to complete her self assessment. The patients biggest stressor is that she lost family members to death and is having hallucinations of them. The patient enjoys music, singing, sports, computers, art, and exercising. The patient states there is nothing she wants to learn at the hospital because she is leaving tomorrow.   Assessment- The patients speech as normal as well as eye contact. The patient had a slower motor activity and had constricted affect. The patient appears to have good insight and her behaviors is cooperative. The patient is oriented X4.   Plan- Encourage patient to attend all groups and listen to doctor and nurse recommendations.

## 2025-05-27 NOTE — PROGRESS NOTES
"Progress Note - Myrna Silvestre 49 y.o. female MRN: [id number]    Unit/Bed#: [facility] 208-01 Encounter: [id number]        Subjective:   Patient seen and examined at bedside after reviewing the chart and discussing the case with the caring staff.      Patient examined at bedside.  Patient has no acute symptoms.    Physical Exam   Vitals: Blood pressure 132/61, pulse 99, temperature 98.2 °F (36.8 °C), temperature source Temporal, resp. rate 18, height 5' 7\" (1.702 m), weight 77.7 kg (171 lb 3.2 oz), SpO2 99%.,Body mass index is 26.81 kg/m².  Constitutional: Patient in no acute distress.  HEENT: PERR, EOMI, MMM.  Cardiovascular: Normal rate and regular rhythm.    Pulmonary/Chest: Effort normal and breath sounds normal.   Abdomen: Soft, + BS, NT.    Assessment/Plan:  Myrna Silvestre is a(n) 49 y.o. female with MDD.     Cardiac w/ Hx of DVT/PE, HLD, POTS, syncope, palpitations.  Continue Eliquis 2.5 mg twice daily, Toprol  mg daily, and Zetia 10 mg daily.  Patient has loop recorder in place.  Follows with cardiology outpt.  Meclizine as needed for dizziness.   Asthma.  Continue Symbicort 160/4.5 mcg twice daily and umeclidinium 62.5 mcg/act (Spiriva nonform).  Albuterol as needed.   Allergic rhinitis.  Continue Flonase spray daily.   Rheumatoid arthritis.  Continue Plaquenil 400 mg daily.  Methotrexate 25 mg/10 mL inj and Orencia 125 mg/mL inj weekly (takes on Wednesdays, last dose 5/21, can have someone bring in from home).  Prediabetes.  Hgb A1c 5.5% on 5/24.  Home: Wegovy 2.4 mg weekly (pt may bring in from home).  Continue Metformin  mg daily (750 nonform).   Hypothyroidism.  TSH WNL.  Continue levothyroxine 150 mcg M-F and 137 mcg Sat/Sun.    Hirsutism.  Continue Spironolactone 50 mg daily  GERD.  Continue Protonix 40 mg daily.   Constipation.  Continue Amitiza 24 mcg twice daily (Linzess nonform).  Bentyl as needed.  OAB/stress incontinence.  Continue oxybutynin XL 10 mg daily (tolterodine nonform).  Iron " deficiency anemia.  Hgb 10.7 on 5/22.  Continue ferrous sulfate 324 mg daily and folic acid 1 mg daily.  Cont to monitor.   Vitamin D deficiency.  Level 24.7 on 5/24.  Start Vit D2 50,000 units weekly x 8 weeks followed by Vit D3 1,000 units daily.   Vitamin B12 deficiency.  Start B12 500 mcg daily 5/25.  Chronic low back pain/scoliosis of thoracolumbar spine.  Patient follows with ortho outpt.  Tylenol as needed.     The patient was discussed with Dr. Zee and he is in agreement with the above note.

## 2025-05-27 NOTE — PROGRESS NOTES
05/27/25    Patient Intake   Living Arrangement House   Can patient return home? 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 Alexandra   Current Treatment Appt Info 5/29/25   Crisis Info   Release of Information Signed Yes

## 2025-05-27 NOTE — PROGRESS NOTES
05/27/25   Team Meeting   Meeting Type Daily Rounds   Initial Conference Date 05/27/25   Team Members Present   Team Members Present Physician;Nurse;;Occupational Therapist   Physician Team Member Cheryl LINDSEY, Monica LOZANO   Nursing Team Member Alejandra RN   Care Management Team Member Jovani VALENCIA   OT Team Member Jah GARCIA   Patient/Family Present   Patient Present No   Patient's Family Present No   201, New PT  admitted due to Audio and Visual hallucinations on the unit PT   slept denies SI/HI/ AVH anxiety and depression. D/C tomorrow .

## 2025-05-27 NOTE — PROGRESS NOTES
Data- Met with the patient to complete her relapse prevention. The patients strengths and things worth living for include her giving good advice, her friends, and her family. Triggers for the patient include losses, sleep problems, pre occupation with past, sleep problems, and financial problems. Warning signs for the patient include being depressed, self isolating, and crying. Coping skills that the patient uses are deep breathing, journaling, exercise, reading, helping others, music, and mindfulness. Support that can be called include her therapist, , and friend.   Assessment- The patients speech was normal as well as eye contact. The patient had a slower motor activity and had constricted affect. The patient appears to have good insight and her behaviors is cooperative. The patient is oriented X4.   Plan- The patient is encouraged to attend all groups and listen to doctor and nurse recommendations.

## 2025-05-28 VITALS
HEIGHT: 67 IN | BODY MASS INDEX: 26.87 KG/M2 | DIASTOLIC BLOOD PRESSURE: 53 MMHG | TEMPERATURE: 98 F | HEART RATE: 63 BPM | SYSTOLIC BLOOD PRESSURE: 96 MMHG | WEIGHT: 171.2 LBS | OXYGEN SATURATION: 98 % | RESPIRATION RATE: 16 BRPM

## 2025-05-28 PROBLEM — F33.3 MDD (MAJOR DEPRESSIVE DISORDER), RECURRENT, SEVERE, WITH PSYCHOSIS (HCC): Status: ACTIVE | Noted: 2025-05-28

## 2025-05-28 PROBLEM — F43.21 COMPLICATED GRIEF: Status: RESOLVED | Noted: 2025-05-24 | Resolved: 2025-05-28

## 2025-05-28 PROBLEM — F32.3 MDD (MAJOR DEPRESSIVE DISORDER), SINGLE EPISODE, SEVERE WITH PSYCHOTIC FEATURES (HCC): Status: RESOLVED | Noted: 2025-05-24 | Resolved: 2025-05-28

## 2025-05-28 PROCEDURE — 99238 HOSP IP/OBS DSCHRG MGMT 30/<: CPT | Performed by: PSYCHIATRY & NEUROLOGY

## 2025-05-28 RX ORDER — ERGOCALCIFEROL 1.25 MG/1
50000 CAPSULE, LIQUID FILLED ORAL WEEKLY
Qty: 7 CAPSULE | Refills: 0 | Status: SHIPPED | OUTPATIENT
Start: 2025-06-01 | End: 2025-07-14

## 2025-05-28 RX ORDER — HYDROXYCHLOROQUINE SULFATE 400 MG/1
400 TABLET ORAL
Start: 2025-05-29 | End: 2025-06-28

## 2025-05-28 RX ADMIN — BUDESONIDE AND FORMOTEROL FUMARATE DIHYDRATE 2 PUFF: 160; 4.5 AEROSOL RESPIRATORY (INHALATION) at 08:11

## 2025-05-28 RX ADMIN — LEVOTHYROXINE SODIUM 150 MCG: 75 TABLET ORAL at 05:49

## 2025-05-28 RX ADMIN — FERROUS SULFATE TAB 325 MG (65 MG ELEMENTAL FE) 325 MG: 325 (65 FE) TAB at 08:12

## 2025-05-28 RX ADMIN — APIXABAN 2.5 MG: 2.5 TABLET, FILM COATED ORAL at 08:12

## 2025-05-28 RX ADMIN — Medication 10000 UNITS: at 08:15

## 2025-05-28 RX ADMIN — OXYBUTYNIN 10 MG: 5 TABLET, FILM COATED, EXTENDED RELEASE ORAL at 08:10

## 2025-05-28 RX ADMIN — CYANOCOBALAMIN TAB 500 MCG 500 MCG: 500 TAB at 08:11

## 2025-05-28 RX ADMIN — UMECLIDINIUM 1 PUFF: 62.5 AEROSOL, POWDER ORAL at 08:16

## 2025-05-28 RX ADMIN — BUPROPION HYDROCHLORIDE 150 MG: 150 TABLET, EXTENDED RELEASE ORAL at 08:11

## 2025-05-28 RX ADMIN — HYDROXYCHLOROQUINE SULFATE 400 MG: 200 TABLET, FILM COATED ORAL at 08:12

## 2025-05-28 RX ADMIN — ARIPIPRAZOLE 2 MG: 2 TABLET ORAL at 08:12

## 2025-05-28 RX ADMIN — LUBIPROSTONE 24 MCG: 24 CAPSULE, GELATIN COATED ORAL at 08:11

## 2025-05-28 RX ADMIN — PANTOPRAZOLE SODIUM 40 MG: 40 TABLET, DELAYED RELEASE ORAL at 08:11

## 2025-05-28 RX ADMIN — FOLIC ACID 1000 MCG: 1 TABLET ORAL at 08:13

## 2025-05-28 RX ADMIN — EZETIMIBE 10 MG: 10 TABLET ORAL at 08:13

## 2025-05-28 RX ADMIN — DULOXETINE HYDROCHLORIDE 60 MG: 60 CAPSULE, DELAYED RELEASE ORAL at 08:14

## 2025-05-28 RX ADMIN — FLUTICASONE PROPIONATE 1 SPRAY: 50 SPRAY, METERED NASAL at 08:15

## 2025-05-28 RX ADMIN — BUSPIRONE HYDROCHLORIDE 15 MG: 15 TABLET ORAL at 08:14

## 2025-05-28 RX ADMIN — METFORMIN HYDROCHLORIDE 500 MG: 500 TABLET, EXTENDED RELEASE ORAL at 08:11

## 2025-05-28 RX ADMIN — Medication 400 UNITS: at 08:12

## 2025-05-28 NOTE — BH TRANSITION RECORD
Contact Information: If you have any questions, concerns, pended studies, tests and/or procedures, or emergencies regarding your inpatient behavioral health visit. Please contact [facility] at  and ask to speak to a , nurse or physician. A contact is available 24 hours/ 7 days a week at this number.     Summary of Procedures Performed During your Stay:  Below is a list of major procedures performed during your hospital stay and a summary of results:  - No major procedures performed.    Pending Studies (From admission, onward)      None          Please follow up on the above pending studies with your PCP and/or referring provider.

## 2025-05-28 NOTE — PROGRESS NOTES
05/28/25   Team Meeting   Meeting Type Daily Rounds   Initial Conference Date 05/28/25   Team Members Present   Team Members Present Physician;Nurse;;Occupational Therapist   Physician Team Member Cheryl LINDSEY, Monica LOZANO   Nursing Team Member Alejandra RN   Care Management Team Member Jovani VALENCIA   OT Team Member Jah GARCIA   Patient/Family Present   Patient Present No   Patient's Family Present No   201 Denies SI/HI/AVH anxiety and Depression D/C today .With [facility] follow up.

## 2025-05-28 NOTE — NURSING NOTE
Patient cooperative and med compliant, visible in the milieu. She denies all psychiatric symptoms. Safety precautions maintained q15 mins.

## 2025-05-28 NOTE — PROGRESS NOTES
05/28/25     Discharge Notification   Notification of Discharge Provided to: Family;Psychiatrist   Family Notified via: Phone call   Psychiatrist Notified via: Phone call   Cm reached out to PT's son/  to notify of D/C   Cm reached out to [facility] to notify of D/C.

## 2025-05-28 NOTE — NURSING NOTE
"Patient visible in milieu, pleasant and cooperative in interaction. Social with staff, limited interaction with peers. Patient denies anxiety/depression, SI/HI, hallucinations. Discharge instructions reviewed with patient, patient verbalized understanding of all instructions. Remains medication compliant and on 15\" checks for safety and behaviors. No S/S of cardiac decompensation.  "

## 2025-05-28 NOTE — PROGRESS NOTES
"Progress Note - Myrna Silvestre 49 y.o. female MRN: [id number]    Unit/Bed#: [facility] 208-01 Encounter: [id number]        Subjective:   Patient seen and examined at bedside after reviewing the chart and discussing the case with the caring staff.      Patient examined at bedside.  Patient has no acute symptoms.    Patient is being discharged today, Wednesday, 5/28/2025.    Physical Exam   Vitals: Blood pressure 96/53, pulse 63, temperature 98 °F (36.7 °C), temperature source Temporal, resp. rate 16, height 5' 7\" (1.702 m), weight 77.7 kg (171 lb 3.2 oz), SpO2 98%.,Body mass index is 26.81 kg/m².  Constitutional: Patient in no acute distress.  HEENT: PERR, EOMI, MMM.  Cardiovascular: Normal rate and regular rhythm.    Pulmonary/Chest: Effort normal and breath sounds normal.   Abdomen: Soft, + BS, NT.    Assessment/Plan:  Myrna Silvestre is a(n) 49 y.o. female with MDD.    Medical clearance.  Patient is medically cleared for discharge.  All scripts were sent out for the patient.     Cardiac w/ Hx of DVT/PE, HLD, POTS, syncope, palpitations.  Continue Eliquis 2.5 mg twice daily, Toprol  mg daily, and Zetia 10 mg daily.  Patient has loop recorder in place.  Follows with cardiology outpt.  Meclizine as needed for dizziness.   Asthma.  Continue Symbicort 160/4.5 mcg twice daily and umeclidinium 62.5 mcg/act (Spiriva nonform).  Albuterol as needed.   Allergic rhinitis.  Continue Flonase spray daily.   Rheumatoid arthritis.  Continue Plaquenil 400 mg daily.  Methotrexate 25 mg/10 mL inj and Orencia 125 mg/mL inj weekly (takes on Wednesdays, last dose 5/21, can have someone bring in from home).  Prediabetes.  Hgb A1c 5.5% on 5/24.  Home: Wegovy 2.4 mg weekly (pt may bring in from home).  Continue Metformin  mg daily (750 nonform).   Hypothyroidism.  TSH WNL.  Continue levothyroxine 150 mcg M-F and 137 mcg Sat/Sun.    Hirsutism.  Continue Spironolactone 50 mg daily  GERD.  Continue Protonix 40 mg daily.   Constipation.  " Continue Amitiza 24 mcg twice daily (Linzess nonform).  Bentyl as needed.  OAB/stress incontinence.  Continue oxybutynin XL 10 mg daily (tolterodine nonform).  Iron deficiency anemia.  Hgb 10.7 on 5/22.  Continue ferrous sulfate 324 mg daily and folic acid 1 mg daily.  Cont to monitor.   Vitamin D deficiency.  Level 24.7 on 5/24.  Start Vit D2 50,000 units weekly x 8 weeks followed by Vit D3 1,000 units daily.   Vitamin B12 deficiency.  Start B12 500 mcg daily 5/25.  Chronic low back pain/scoliosis of thoracolumbar spine.  Patient follows with ortho outpt.  Tylenol as needed.     The patient was discussed with Dr. Zee and he is in agreement with the above note.

## 2025-05-28 NOTE — NURSING NOTE
Patient discharged, ambulatory, accompanied by staff to car, with all belongings, discharge instructions, and return to work note.

## 2025-05-28 NOTE — PROGRESS NOTES
05/28/25    Discharge Planning   Living Arrangements Lives w/ Spouse/significant other   Support Systems Self;Spouse/significant other   Type of Current Residence Private residence   Discharge Communications   Discharge planning discussed with: PT   Freedom of Choice Yes   CM contacted family/caregiver? Yes   Were Treatment Team discharge recommendations reviewed with patient/caregiver? Yes   Did patient/caregiver verbalize understanding of patient care needs? Yes   Were patient/caregiver advised of the risks associated with not following Treatment Team discharge recommendations? Yes   Contacts   Patient Contacts Alexis (Son)/ Jose M    Relationship to Patient: Family   Contact Method Phone   Phone Number [phone]   Reason/Outcome Continuity of Care;Discharge Planning   [facility]   Would you like to participate in our [facility] service program?   No - Declined

## 2025-06-12 NOTE — ED PROVIDER NOTES
"Time reflects when diagnosis was documented in both MDM as applicable and the Disposition within this note       Time User Action Codes Description Comment    5/22/2025  9:50 PM SwMark keen Add [R42] Lightheadedness     5/22/2025  9:50 PM Mark Camarena Add [R07.9] Chest pain, unspecified     5/22/2025  9:51 PM SwMark keen Add [F32.A] Depression     5/23/2025  6:31 AM LazchristinakyEmilee Modify [R42] Lightheadedness     5/23/2025  6:31 AM LazanskyEmilee Modify [F32.A] Depression     5/23/2025  6:31 AM Emilee Rendon Add [R44.3] Hallucinations     5/23/2025  1:55 PM Augustin Solano [I26.99] Pulmonary embolism, other, unspecified chronicity, unspecified whether acute cor pulmonale present (HCC)     5/23/2025  1:55 PM Augustin Solano [R73.01] IFG (impaired fasting glucose)     5/23/2025  1:55 PM Augustin Solano [D75.839] Thrombocytosis           ED Disposition       ED Disposition   Transfer to Behavioral Health Condition   --    Date/Time   Thu May 22, 2025  9:50 PM    Comment   Myrna Silvestre should be transferred out to  and has been medically cleared.               Assessment & Plan       Medical Decision Making  49-year-old female had multiple complaints.  Medically she was complaining of shortness of breath and palpitations.  Laboratory studies were performed to rule out significant anemia, electrolyte abnormalities and were reassuring.  EKG and troponin without evidence of cardiac ischemia or significant arrhythmias.  D-dimer was performed to rule out pulmonary embolism given shortness of breath and tachycardia and was elevated.  A subsequent CTA of the chest was negative for PE.  Patient was medically cleared for crisis eval and signed \"201\" paperwork.  We will plan to observe in the ED until she can be placed in an appropriate inpatient psychiatric facility.    Amount and/or Complexity of Data Reviewed  Labs: ordered. Decision-making details documented in ED " Course.  Radiology: ordered and independent interpretation performed. Decision-making details documented in ED Course.  ECG/medicine tests: ordered and independent interpretation performed. Decision-making details documented in ED Course.    Risk  Prescription drug management.  Decision regarding hospitalization.        ED Course as of 06/12/25 1204   Thu May 22, 2025   1829 EKG independently interpreted by me: Sinus rhythm at rate of 98, there is a short WA interval, intervals otherwise normal.  Normal axis, no significant ST elevation or depression to suggest cardiac ischemia.       Medications   sodium chloride 0.9 % bolus 1,000 mL (0 mL Intravenous Stopped 5/22/25 1947)   iohexol (OMNIPAQUE) 350 MG/ML injection (MULTI-DOSE) 85 mL (85 mL Intravenous Given 5/22/25 2122)       ED Risk Strat Scores                    No data recorded                            History of Present Illness       Chief Complaint   Patient presents with    Medical Problem     Pt c/o sob and dizziness for awhile. Worse over the last 2 weeks. Pt also c/o increased depression. Denies SI/HI. States has been seeing people who are not there and hearing voices of relatives of have passed. Sees therapist and was recommended to come to ER for higher level of care.       Past Medical History[1]   Past Surgical History[2]   Family History[3]   Social History[4]   E-Cigarette/Vaping    E-Cigarette Use Never User       E-Cigarette/Vaping Substances      I have reviewed and agree with the history as documented.     Myrna is a 50 yo F here for evaluation of multiple complaints.  She is primarily here due to worsening depression.  She denies SI/HI but reports that her symptoms are worsening and she is having trouble motivating herself to do much of anything.  She has also been having hallucinations of dead relatives.  She had a meeting with her therapist who recommended she come to the ED for evaluation for possible inpatient treatment.  Additionally  he has been having some sensation of difficulty breathing, chest tightness, and palpitations for several days.  Denies fevers.  She was seen by her allergist today who suspects she may have asthma.      Medical Problem  Associated symptoms: fatigue and shortness of breath    Associated symptoms: no abdominal pain, no chest pain, no cough, no fever, no nausea, no rash, no sore throat and no vomiting        Review of Systems   Constitutional:  Positive for fatigue. Negative for chills and fever.   HENT:  Negative for sore throat.    Eyes:  Negative for visual disturbance.   Respiratory:  Positive for chest tightness and shortness of breath. Negative for cough.    Cardiovascular:  Negative for chest pain and palpitations.   Gastrointestinal:  Negative for abdominal pain, nausea and vomiting.   Genitourinary:  Negative for dysuria and hematuria.   Musculoskeletal:  Negative for arthralgias and back pain.   Skin:  Negative for color change and rash.   Neurological:  Negative for syncope.   Psychiatric/Behavioral:  Positive for dysphoric mood and hallucinations. Negative for self-injury and suicidal ideas.    All other systems reviewed and are negative.          Objective       ED Triage Vitals [05/22/25 1737]   Temperature Pulse Blood Pressure Respirations SpO2 Patient Position - Orthostatic VS   98.2 °F (36.8 °C) (!) 117 148/70 22 100 % Sitting      Temp Source Heart Rate Source BP Location FiO2 (%) Pain Score    Oral Monitor Right arm -- --      Vitals      Date and Time Temp Pulse SpO2 Resp BP Pain Score FACES Pain Rating User   05/23/25 0910 -- -- -- -- 102/63 -- --    05/23/25 0903 -- -- -- -- 102/63 -- --    05/23/25 0826 -- 70 100 % 14 120/68 -- --    05/23/25 0400 -- 78 98 % 16 117/60 -- --    05/23/25 0153 -- 84 98 % -- 128/66 -- -- EG   05/22/25 2130 -- 80 97 % -- 121/65 -- -- AB   05/22/25 2000 -- 86 97 % 20 121/62 -- -- AB   05/22/25 1737 98.2 °F (36.8 °C) 117 100 % 22 148/70 -- --              Physical Exam  Vitals and nursing note reviewed.   Constitutional:       General: She is not in acute distress.     Appearance: She is well-developed.   HENT:      Head: Normocephalic and atraumatic.     Eyes:      Conjunctiva/sclera: Conjunctivae normal.       Cardiovascular:      Rate and Rhythm: Regular rhythm. Tachycardia present.      Heart sounds: No murmur heard.  Pulmonary:      Effort: Pulmonary effort is normal. No respiratory distress.      Breath sounds: Normal breath sounds.   Abdominal:      Palpations: Abdomen is soft.      Tenderness: There is no abdominal tenderness.     Musculoskeletal:         General: No swelling.      Cervical back: Neck supple.     Skin:     General: Skin is warm and dry.      Capillary Refill: Capillary refill takes less than 2 seconds.     Neurological:      General: No focal deficit present.      Mental Status: She is alert and oriented to person, place, and time.     Psychiatric:         Mood and Affect: Mood normal.         Results Reviewed       Procedure Component Value Units Date/Time    TSH, 3rd generation with Free T4 reflex [[id number]]  (Normal) Collected: 05/22/25 1841    Lab Status: Final result Specimen: Blood from Arm, Left Updated: 05/22/25 1925     TSH 3RD GENERATION 4.495 uIU/mL     HS Troponin 0hr (reflex protocol) [[id number]]  (Normal) Collected: 05/22/25 1841    Lab Status: Final result Specimen: Blood from Arm, Left Updated: 05/22/25 1917     hs TnI 0hr <2 ng/L     Comprehensive metabolic panel [[id number]]  (Abnormal) Collected: 05/22/25 1841    Lab Status: Final result Specimen: Blood from Arm, Left Updated: 05/22/25 1910     Sodium 137 mmol/L      Potassium 3.5 mmol/L      Chloride 102 mmol/L      CO2 27 mmol/L      ANION GAP 8 mmol/L      BUN 12 mg/dL      Creatinine 0.58 mg/dL      Glucose 90 mg/dL      Calcium 9.5 mg/dL      AST 10 U/L      ALT 9 U/L      Alkaline Phosphatase 51 U/L      Total Protein 8.1 g/dL      Albumin 4.4 g/dL      Total  Bilirubin 0.23 mg/dL      eGFR 108 ml/min/1.73sq m     Narrative:      [employer] guidelines for Chronic Kidney Disease (CKD):     Stage 1 with normal or high GFR (GFR > 90 mL/min/1.73 square meters)    Stage 2 Mild CKD (GFR = 60-89 mL/min/1.73 square meters)    Stage 3A Moderate CKD (GFR = 45-59 mL/min/1.73 square meters)    Stage 3B Moderate CKD (GFR = 30-44 mL/min/1.73 square meters)    Stage 4 Severe CKD (GFR = 15-29 mL/min/1.73 square meters)    Stage 5 End Stage CKD (GFR <15 mL/min/1.73 square meters)  Note: GFR calculation is accurate only with a steady state creatinine    Ethanol [[id number]]  (Normal) Collected: 05/22/25 1841    Lab Status: Final result Specimen: Blood from Arm, Left Updated: 05/22/25 1909     Ethanol Lvl <10 mg/dL     D-Dimer [[id number]]  (Abnormal) Collected: 05/22/25 1841    Lab Status: Final result Specimen: Blood from Arm, Left Updated: 05/22/25 1908     D-Dimer, Quant 1.74 ug/ml FEU     Rapid drug screen, urine [[id number]]  (Normal) Resulted: 05/22/25 1901    Lab Status: Final result Specimen: Urine Updated: 05/22/25 1901     Amph/Meth UR Negative     Barbiturate Ur Negative     Benzodiazepine Urine Negative     Cocaine Urine Negative     Methadone Urine Negative     Opiate Urine Negative     PCP Ur Negative     THC Urine Negative     Oxycodone Urine Negative     Fentanyl Urine Negative     HYDROCODONE URINE Negative    Narrative:      FOR MEDICAL PURPOSES ONLY.   IF CONFIRMATION NEEDED PLEASE CONTACT THE LAB WITHIN 5 DAYS.    Drug Screen Cutoff Levels:  AMPHETAMINE/METHAMPHETAMINES  1000 ng/mL  BARBITURATES     200 ng/mL  BENZODIAZEPINES     200 ng/mL  COCAINE      300 ng/mL  METHADONE      300 ng/mL  OPIATES      300 ng/mL  PHENCYCLIDINE     25 ng/mL  THC       50 ng/mL  OXYCODONE      100 ng/mL  FENTANYL      5 ng/mL  HYDROCODONE     300 ng/mL    CBC and differential [[id number]]  (Abnormal) Collected: 05/22/25 1841    Lab Status: Final result Specimen: Blood  from Arm, Left Updated: 05/22/25 1854     WBC 7.99 Thousand/uL      RBC 3.94 Million/uL      Hemoglobin 10.7 g/dL      Hematocrit 34.4 %      MCV 87 fL      MCH 27.2 pg      MCHC 31.1 g/dL      RDW 14.5 %      MPV 10.0 fL      Platelets 393 Thousands/uL      nRBC 0 /100 WBCs      Segmented % 73 %      Immature Grans % 0 %      Lymphocytes % 18 %      Monocytes % 7 %      Eosinophils Relative 1 %      Basophils Relative 1 %      Absolute Neutrophils 5.85 Thousands/µL      Absolute Immature Grans 0.02 Thousand/uL      Absolute Lymphocytes 1.46 Thousands/µL      Absolute Monocytes 0.56 Thousand/µL      Eosinophils Absolute 0.05 Thousand/µL      Basophils Absolute 0.05 Thousands/µL     Urine Microscopic [[id number]]  (Normal) Collected: 05/22/25 1836    Lab Status: Final result Specimen: Urine, Clean Catch Updated: 05/22/25 1849     RBC, UA 1-2 /hpf      WBC, UA 1-2 /hpf      Epithelial Cells Occasional /hpf      Bacteria, UA Occasional /hpf     Urine Macroscopic, POC [[id number]]  (Abnormal) Collected: 05/22/25 1836    Lab Status: Final result Specimen: Urine Updated: 05/22/25 1837     Color, UA Yellow     Clarity, UA Clear     pH, UA 5.5     Leukocytes, UA Negative     Nitrite, UA Negative     Protein, UA Negative mg/dl      Glucose, UA Negative mg/dl      Ketones, UA Negative mg/dl      Urobilinogen, UA 0.2 E.U./dl      Bilirubin, UA Negative     Occult Blood, UA Small     Specific Gravity, UA 1.020    Narrative:      CLINITEK RESULT            CTA chest pe study   Final Interpretation by Andres Quintero MD (05/22 2237)      No acute pulmonary embolism or thoracic aortic aneurysm/dissection. No acute intrathoracic abnormalities. A nonspecific subpleural 6 mm nodule in the right lower lobe is seen. Recommend follow-up CT chest in 6 to 12 months and 18 to 24 months per current    Fleischner Society guidelines.         The study was marked in EPIC for immediate notification.         Computerized Assisted Algorithm (CAA)  "may have aided analysis of applicable images.      Workstation performed: [id number]             Procedures    ED Medication and Procedure Management   Prior to Admission Medications   Prescriptions Last Dose Informant Patient Reported? Taking?   Abatacept (Orencia ClickJect) 125 MG/ML SOAJ   Yes No   Sig: Inject 125 mg under the skin   Ascorbic Acid (vitamin C) 100 MG tablet 5/22/2025 Morning  Yes Yes   Sig: Take 100 mg by mouth in the morning.   DULoxetine (CYMBALTA) 60 mg delayed release capsule   Yes No   Sig: Take 60 mg by mouth daily   Patient not taking: Reported on 4/3/2025   Linzess 145 MCG CAPS 5/22/2025 Morning  Yes Yes   Sig: Take 145 mcg by mouth in the morning.   Methotrexate Sodium (methotrexate, PF,) 250 MG/10ML injection   Yes No   Sig: Inject 25 mg under the skin once a week   Spiriva Respimat 2.5 MCG/ACT AERS inhaler 5/22/2025 Morning  Yes Yes   Sig: Inhale 2 puffs in the morning.   Syringe/Needle, Disp, 27G X 1/2\" 1 ML MISC   Yes No   Sig: As directed with methotrexate use   Patient not taking: Reported on 5/23/2025   Wegovy 1 MG/0.5ML   Yes No   Sig: Inject 1 mg under the skin Once a week   albuterol (PROVENTIL HFA,VENTOLIN HFA) 90 mcg/act inhaler 5/22/2025 Noon  Yes Yes   Sig: Inhale 2 puffs every 6 (six) hours as needed for wheezing   b complex vitamins capsule 5/22/2025 Morning  Yes Yes   Sig: Take 1 capsule by mouth in the morning   buPROPion (WELLBUTRIN SR) 150 mg 12 hr tablet Not Taking  Yes No   Sig: Take 150 mg by mouth in the morning   busPIRone (BUSPAR) 15 mg tablet 5/22/2025 Morning  Yes Yes   Sig: Take 15 mg by mouth in the morning and 15 mg in the evening.   dicyclomine (BENTYL) 20 mg tablet 5/22/2025 Evening  No Yes   Sig: Take 1 tablet (20 mg total) by mouth every 6 (six) hours as needed (diarrhea, abdominal cramps)   ezetimibe (ZETIA) 10 mg tablet   Yes No   Sig: Take 10 mg by mouth in the morning.   ferrous sulfate 324 (65 Fe) mg 5/22/2025 Morning  No Yes   Sig: Take 1 tablet " (324 mg total) by mouth 2 (two) times a day   folic acid (FOLVITE) 1 mg tablet 5/22/2025 Morning  No Yes   Sig: TAKE 1 TABLET(1 MG) BY MOUTH DAILY   levothyroxine 137 mcg tablet  Pharmacy (Specify) Yes Yes   Sig: Take 137 mcg by mouth see administration instructions Once daily on SATURDAYS and SUNDAYS only   levothyroxine 150 mcg tablet  Pharmacy (Specify) Yes Yes   Sig: Take 150 mcg by mouth see administration instructions Daily MONDAY through FRIDAY   meclizine (ANTIVERT) 12.5 MG tablet 5/22/2025 Morning  Yes Yes   Sig: Take 12.5 mg by mouth daily as needed for dizziness or nausea   metFORMIN (GLUCOPHAGE-XR) 750 mg 24 hr tablet 5/22/2025 Morning  Yes Yes   Sig: Take 750 mg by mouth daily with breakfast   metoprolol succinate (TOPROL-XL) 100 mg 24 hr tablet 5/22/2025 Morning  Yes Yes   Sig: Take 100 mg by mouth in the morning.   ondansetron (ZOFRAN-ODT) 4 mg disintegrating tablet   No Yes   Sig: Take 1 tablet (4 mg total) by mouth every 6 (six) hours as needed for nausea   pantoprazole (PROTONIX) 40 mg tablet 5/22/2025 Evening  Yes Yes   Sig: Take 1 tablet by mouth in the morning and 1 tablet in the evening.   spironolactone (ALDACTONE) 50 mg tablet 5/22/2025 Morning  Yes Yes   Sig: Take 50 mg by mouth in the morning.   tolterodine (DETROL LA) 4 mg 24 hr capsule 5/22/2025 Morning  Yes Yes   Sig: Take 4 mg by mouth in the morning.   vitamin A 3 MG (71740 UT) capsule 5/22/2025 Morning  Yes Yes   Sig: Take 10,000 Units by mouth in the morning.   vitamin E, tocopherol, 400 units capsule 5/22/2025 Morning  Yes Yes   Sig: Take 400 Units by mouth in the morning.      Facility-Administered Medications: None     Discharge Medication List as of 5/23/2025  2:59 PM        CONTINUE these medications which have NOT CHANGED    Details   albuterol (PROVENTIL HFA,VENTOLIN HFA) 90 mcg/act inhaler Inhale 2 puffs every 6 (six) hours as needed for wheezing, Historical Med      Ascorbic Acid (vitamin C) 100 MG tablet Take 100 mg by  mouth in the morning., Historical Med      b complex vitamins capsule Take 1 capsule by mouth in the morning, Starting Sat 10/26/2024, Historical Med      busPIRone (BUSPAR) 15 mg tablet Take 15 mg by mouth 2 (two) times a day, Starting Tue 10/17/2023, Historical Med      dicyclomine (BENTYL) 20 mg tablet Take 1 tablet (20 mg total) by mouth every 6 (six) hours as needed (diarrhea, abdominal cramps), Starting Sat 7/1/2023, Normal      ferrous sulfate 324 (65 Fe) mg Take 1 tablet (324 mg total) by mouth 2 (two) times a day, Starting Thu 4/10/2025, Normal      folic acid (FOLVITE) 1 mg tablet TAKE 1 TABLET(1 MG) BY MOUTH DAILY, Starting Thu 4/10/2025, Normal      !! levothyroxine 137 mcg tablet Take 137 mcg by mouth see administration instructions Once daily on SATURDAYS and SUNDAYS only, Historical Med      !! levothyroxine 150 mcg tablet Take 150 mcg by mouth see administration instructions Daily MONDAY through FRIDAY, Historical Med      Linzess 145 MCG CAPS Take 145 mcg by mouth in the morning., Starting Wed 10/9/2024, Historical Med      meclizine (ANTIVERT) 12.5 MG tablet Take 12.5 mg by mouth daily as needed for dizziness or nausea, Historical Med      metFORMIN (GLUCOPHAGE-XR) 750 mg 24 hr tablet Take 750 mg by mouth daily with breakfast, Starting Tue 10/17/2023, Until Fri 5/23/2025, Historical Med      metoprolol succinate (TOPROL-XL) 100 mg 24 hr tablet Take 100 mg by mouth in the morning., Historical Med      ondansetron (ZOFRAN-ODT) 4 mg disintegrating tablet Take 1 tablet (4 mg total) by mouth every 6 (six) hours as needed for nausea, Starting Sat 7/1/2023, Normal      pantoprazole (PROTONIX) 40 mg tablet Take 1 tablet by mouth in the morning and 1 tablet in the evening., Starting Wed 5/8/2024, Historical Med      Spiriva Respimat 2.5 MCG/ACT AERS inhaler Inhale 2 puffs in the morning., Historical Med      spironolactone (ALDACTONE) 50 mg tablet Take 50 mg by mouth in the morning., Starting Tue 3/4/2014,  "Historical Med      tolterodine (DETROL LA) 4 mg 24 hr capsule Take 4 mg by mouth in the morning., Starting Thu 9/12/2024, Until Fri 9/12/2025, Historical Med      vitamin A 3 MG (60664 UT) capsule Take 10,000 Units by mouth in the morning., Historical Med      vitamin E, tocopherol, 400 units capsule Take 400 Units by mouth in the morning., Historical Med      Eliquis 5 MG Take 5 mg by mouth in the morning and 5 mg in the evening., Starting Sun 6/5/2022, Historical Med      Ergocalciferol (VITAMIN D2 PO) Take 50,000 Units by mouth once a week, Starting Mon 3/16/2015, Historical Med      Abatacept (Orencia ClickJect) 125 MG/ML SOAJ Inject 125 mg under the skin, Starting Mon 11/20/2023, Until Thu 10/31/2024 at 2359, Historical Med      buPROPion (WELLBUTRIN SR) 150 mg 12 hr tablet Take 150 mg by mouth in the morning, Starting Thu 10/5/2023, Historical Med      DULoxetine (CYMBALTA) 60 mg delayed release capsule Take 60 mg by mouth daily, Starting Mon 4/3/2023, Until Thu 10/31/2024, Historical Med      ezetimibe (ZETIA) 10 mg tablet Take 10 mg by mouth in the morning., Starting Thu 4/25/2024, Until Fri 4/25/2025, Historical Med      Methotrexate Sodium (methotrexate, PF,) 250 MG/10ML injection Inject 25 mg under the skin once a week, Starting Fri 9/8/2023, Until Thu 4/3/2025, Historical Med      Syringe/Needle, Disp, 27G X 1/2\" 1 ML MISC As directed with methotrexate use, Starting Fri 6/21/2024, Historical Med      Wegovy 1 MG/0.5ML Inject 1 mg under the skin Once a week, Starting Thu 8/8/2024, Historical Med      FeroSul 325 (65 Fe) MG tablet Take 1 tablet by mouth daily with breakfast, Starting Wed 9/15/2021, Historical Med      lubiprostone (AMITIZA) 24 mcg capsule TAKE 1 CAPSULE (24 MCG TOTAL) BY MOUTH 2 (TWO) TIMES A DAY WITH MEALS, Historical Med      metoprolol tartrate (LOPRESSOR) 25 mg tablet Take 25 mg by mouth 2 (two) times a day, Starting Wed 4/12/2023, Until Thu 4/11/2024, Historical Med    "   metroNIDAZOLE (METROGEL) 0.75 % gel Apply topically in the morning, Starting Thu 10/31/2024, Normal      Mirabegron ER (Myrbetriq) 50 MG TB24 Take 50 mg by mouth daily, Starting 2023, Until Fri 11/15/2024, Historical Med      norethindrone (Aygestin) 5 mg tablet Take 1 tablet (5 mg total) by mouth daily, Starting 2024, Normal      predniSONE 10 mg tablet PLEASE TAKE 1 TABLET BY MOUTH DAILY UNTIL YOUR NEXT VISIT, Historical Med      rosuvastatin (CRESTOR) 40 MG tablet Take 40 mg by mouth daily, Starting 2023, Historical Med      SUMAtriptan (IMITREX) 50 mg tablet Take 50 mg by mouth, Starting 2017, Historical Med       !! - Potential duplicate medications found. Please discuss with provider.        No discharge procedures on file.  ED SEPSIS DOCUMENTATION   Time reflects when diagnosis was documented in both MDM as applicable and the Disposition within this note       Time User Action Codes Description Comment    2025  9:50 PM Mark Camarena Add [R42] Lightheadedness     2025  9:50 PM Mark Camarena Add [R07.9] Chest pain, unspecified     2025  9:51 PM Mark Camarena Add [F32.A] Depression     2025  6:31 AM Emilee Rendon Modify [R42] Lightheadedness     2025  6:31 AM Emilee Rendon Modify [F32.A] Depression     2025  6:31 AM Emilee Rendon Add [R44.3] Hallucinations     2025  1:55 PM Augustin Solano [I26.99] Pulmonary embolism, other, unspecified chronicity, unspecified whether acute cor pulmonale present (HCC)     2025  1:55 PM Augustin Solano [R73.01] IFG (impaired fasting glucose)     2025  1:55 PM Augustin Solano [D75.839] Thrombocytosis                    [1]   Past Medical History:  Diagnosis Date    Arthritis     Disease of thyroid gland     Fibromyalgia     Scoliosis    [2]   Past Surgical History:  Procedure Laterality Date     SECTION      CHOLECYSTECTOMY     [3]   Family  History  Problem Relation Name Age of Onset    Cancer Mother          ovary    Diabetes Mother      Cancer Maternal Aunt          ovary    Leukemia Maternal Aunt      Heart disease Neg Hx     [4]   Social History  Tobacco Use    Smoking status: Never    Smokeless tobacco: Never   Vaping Use    Vaping status: Never Used   Substance Use Topics    Alcohol use: No    Drug use: No        Mark Camarena MD  06/12/25 1794